# Patient Record
Sex: FEMALE | Race: WHITE
[De-identification: names, ages, dates, MRNs, and addresses within clinical notes are randomized per-mention and may not be internally consistent; named-entity substitution may affect disease eponyms.]

---

## 2017-12-18 ENCOUNTER — HOSPITAL ENCOUNTER (EMERGENCY)
Dept: HOSPITAL 88 - ER | Age: 82
LOS: 1 days | Discharge: HOME | End: 2017-12-19
Payer: MEDICARE

## 2017-12-18 VITALS — WEIGHT: 105 LBS | BODY MASS INDEX: 19.32 KG/M2 | HEIGHT: 62 IN

## 2017-12-18 DIAGNOSIS — G44.1: ICD-10-CM

## 2017-12-18 DIAGNOSIS — N30.00: ICD-10-CM

## 2017-12-18 DIAGNOSIS — G44.89: Primary | ICD-10-CM

## 2017-12-18 LAB
ALBUMIN SERPL-MCNC: 3.3 G/DL (ref 3.5–5)
ALBUMIN/GLOB SERPL: 0.7 {RATIO} (ref 0.8–2)
ALP SERPL-CCNC: 73 IU/L (ref 40–150)
ALT SERPL-CCNC: 18 IU/L (ref 0–55)
ANION GAP SERPL CALC-SCNC: 20.4 MMOL/L (ref 8–16)
BACTERIA URNS QL MICRO: (no result) /HPF
BASOPHILS # BLD AUTO: 0.1 10*3/UL (ref 0–0.1)
BASOPHILS NFR BLD AUTO: 0.5 % (ref 0–1)
BILIRUB UR QL: NEGATIVE
BUN SERPL-MCNC: 17 MG/DL (ref 7–26)
BUN/CREAT SERPL: 17 (ref 6–25)
CALCIUM SERPL-MCNC: 10.2 MG/DL (ref 8.4–10.2)
CHLORIDE SERPL-SCNC: 96 MMOL/L (ref 98–107)
CK MB SERPL-MCNC: 1.3 NG/ML (ref 0–5)
CK SERPL-CCNC: 21 IU/L (ref 29–168)
CLARITY UR: (no result)
CO2 SERPL-SCNC: 22 MMOL/L (ref 22–29)
COLOR UR: YELLOW
DEPRECATED APTT PLAS QN: 25.2 SECONDS (ref 23.8–35.5)
DEPRECATED INR PLAS: 1.33
DEPRECATED NEUTROPHILS # BLD AUTO: 8.7 10*3/UL (ref 2.1–6.9)
DEPRECATED RBC URNS MANUAL-ACNC: (no result) /HPF (ref 0–5)
EGFRCR SERPLBLD CKD-EPI 2021: 54 ML/MIN (ref 60–?)
EOSINOPHIL # BLD AUTO: 0 10*3/UL (ref 0–0.4)
EOSINOPHIL NFR BLD AUTO: 0.3 % (ref 0–6)
EPI CELLS URNS QL MICRO: (no result) /LPF
ERYTHROCYTE [DISTWIDTH] IN CORD BLOOD: 20.3 % (ref 11.7–14.4)
GLOBULIN PLAS-MCNC: 4.9 G/DL (ref 2.3–3.5)
GLUCOSE SERPLBLD-MCNC: 85 MG/DL (ref 74–118)
HCT VFR BLD AUTO: 34.2 % (ref 34.2–44.1)
HGB BLD-MCNC: 10.1 G/DL (ref 12–16)
KETONES UR QL STRIP.AUTO: (no result)
LEUKOCYTE ESTERASE UR QL STRIP.AUTO: (no result)
LYMPHOCYTES # BLD: 2.1 10*3/UL (ref 1–3.2)
LYMPHOCYTES NFR BLD AUTO: 18 % (ref 18–39.1)
MCH RBC QN AUTO: 22.1 PG (ref 28–32)
MCHC RBC AUTO-ENTMCNC: 29.5 G/DL (ref 31–35)
MCV RBC AUTO: 75 FL (ref 81–99)
MONOCYTES # BLD AUTO: 0.6 10*3/UL (ref 0.2–0.8)
MONOCYTES NFR BLD AUTO: 4.9 % (ref 4.4–11.3)
MUCOUS THREADS URNS QL MICRO: (no result)
NEUTS SEG NFR BLD AUTO: 76 % (ref 38.7–80)
NITRITE UR QL STRIP.AUTO: NEGATIVE
PLATELET # BLD AUTO: 417 X10E3/UL (ref 140–360)
POTASSIUM SERPL-SCNC: 4.4 MMOL/L (ref 3.5–5.1)
PROT UR QL STRIP.AUTO: (no result)
PROTHROMBIN TIME: 17.2 SECONDS (ref 11.9–14.5)
RBC # BLD AUTO: 4.56 X10E6/UL (ref 3.6–5.1)
SODIUM SERPL-SCNC: 134 MMOL/L (ref 136–145)
SP GR UR STRIP: 1.02 (ref 1.01–1.02)
TROPONIN I SERPL DL<=0.01 NG/ML-MCNC: 0.02 NG/ML (ref 0–0.3)
UROBILINOGEN UR STRIP-MCNC: 0.2 MG/DL (ref 0.2–1)

## 2017-12-18 PROCEDURE — 85025 COMPLETE CBC W/AUTO DIFF WBC: CPT

## 2017-12-18 PROCEDURE — 70450 CT HEAD/BRAIN W/O DYE: CPT

## 2017-12-18 PROCEDURE — 96365 THER/PROPH/DIAG IV INF INIT: CPT

## 2017-12-18 PROCEDURE — 82553 CREATINE MB FRACTION: CPT

## 2017-12-18 PROCEDURE — 36415 COLL VENOUS BLD VENIPUNCTURE: CPT

## 2017-12-18 PROCEDURE — 82550 ASSAY OF CK (CPK): CPT

## 2017-12-18 PROCEDURE — 85730 THROMBOPLASTIN TIME PARTIAL: CPT

## 2017-12-18 PROCEDURE — 81001 URINALYSIS AUTO W/SCOPE: CPT

## 2017-12-18 PROCEDURE — 99284 EMERGENCY DEPT VISIT MOD MDM: CPT

## 2017-12-18 PROCEDURE — 80053 COMPREHEN METABOLIC PANEL: CPT

## 2017-12-18 PROCEDURE — 85610 PROTHROMBIN TIME: CPT

## 2017-12-18 PROCEDURE — 84484 ASSAY OF TROPONIN QUANT: CPT

## 2017-12-18 PROCEDURE — 87086 URINE CULTURE/COLONY COUNT: CPT

## 2017-12-18 PROCEDURE — 93005 ELECTROCARDIOGRAM TRACING: CPT

## 2017-12-18 NOTE — DIAGNOSTIC IMAGING REPORT
EXAMINATION: Head CT without contrast.  





HISTORY:Headache.



COMPARISON:CT brain from 12/14/2017.



TECHNIQUE: Multidetector axial images were obtained from the foramen magnum to

the vertex without contrast. The images were reconstructed using brain and bone

algorithms.  Thin section brain images were reformatted into coronal and

sagittal planes.  

Intravenous contrast: None  



IMAGE QUALITY: Acceptable.



FINDINGS:

    Skull/scalp: No abnormality.

    Parenchyma: Focal, subtle hypodensity in the posterior inferior aspect of

left lentiform nucleus (image 9, series 2) represents age indeterminate lacunar

infarct (better visualized in current study).

Nonspecific few, scattered supratentorial white matter hypodensity are likely

related to small vessel ischemic changes.

No acute hemorrhage or mass.

    Arteries: Atherosclerotic calcification in bilateral carotid siphon.

    Dural sinuses: No abnormal density suggestive of thrombosis. 

    Ventricles: Moderate compensated dilatation due to volume loss. No

hydrocephalus.

    Extra-axial spaces: No abnormal density.  

    Brain volume: Normal for age.

    Craniocervical junction: No mass, Chiari malformation, or basilar

invagination.

    Sella: No mass. 

    Paranasal/mastoid sinuses: Imaged portions unremarkable.



IMPRESSION:



1. 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



2. Moderate generalized cerebral volume loss and mild supratentorial white

matter microvascular ischemic changes.



Signed by: Dr. Kendra Danielle M.D. on 12/18/2017 10:57 PM

## 2017-12-29 ENCOUNTER — HOSPITAL ENCOUNTER (OUTPATIENT)
Dept: HOSPITAL 88 - RAD | Age: 82
End: 2017-12-29
Attending: INTERNAL MEDICINE
Payer: MEDICARE

## 2017-12-29 DIAGNOSIS — M79.672: Primary | ICD-10-CM

## 2017-12-29 DIAGNOSIS — M1A.9XX1: ICD-10-CM

## 2017-12-29 NOTE — DIAGNOSTIC IMAGING REPORT
PROCEDURE:HAND THREE VIEWS BILATERAL

TECHNIQUE:AP, lateral, and oblique images of the right and left hands 

obtained

INDICATION:Gout and poor circulation

COMPARISON:None.

 

FINDINGS:

 

Right hand: The bones are diffusely demineralized. There is narrowing 

of the proximal and distal IP joints of all digits. There are sharply 

marginated periarticular erosions at the head of the middle phalanges 

of the second and fourth digits with associated soft tissue swelling. 

The second digit is more severely affected than the fourth digit.  

Small periarticular erosion is identified at the base of the middle 

phalanx of the fifth digit. The IP joint of the first digit exhibits 

osteophytosis in one or 2 periarticular erosions. The metacarpals and 

bones of the wrist are intact and normal in morphology. There are no 

calcifications in the soft tissues.

 

Left hand:

 

Diffusely demineralized. There are erosions and osteophyte formation of 

the first CMC joint.

This results in subluxation of the base of the first metacarpal and 

hyperextension of the thumb. The proximal and distal IP joints are 

narrowed. Periarticular erosions are identified at the base of the 

middle phalanx of the third digit and at the head of the proximal 

phalanx of the third digit with associated soft tissue swelling. There 

are small periarticular erosions at the head of the middle phalanx of 

the second digit and at the base of the distal phalanx of the third 

digit. There is increased lucency between the scaphoid and capitate. A 

small erosion along the radial cortical margin of the capitate is 

suspected. There are no calcifications in the soft tissues.

 

 

CONCLUSION:

1.  Periarticular erosions and soft tissue swelling of the right hand, 

particularly the second digit, consistent with history of gout. 

 

2. Periarticular erosions and  soft tissue swelling of the left hand 

consistent with history of gout. The base of the thumb and third digit 

are most severely affected. Involvement of the wrist by gout is also 

suspected.

 

 

 

Dictated by:  Sean Cárdenas M.D. on 12/29/2017 at 16:52     

Electronically approved by:  Sean Cárdenas M.D. on 12/29/2017 at 

16:52

## 2017-12-29 NOTE — DIAGNOSTIC IMAGING REPORT
PROCEDURE:FOOT COMPLETE BILATERAL

TECHNIQUE:AP, lateral, and oblique views of the feet obtained

INDICATION:Tophaceous gout, pain

COMPARISON:Right foot x-ray 8/5/17.

 

FINDINGS:

Right foot: No evidence of fracture or dislocation. Focal erosion of 

the base of the fifth metatarsal is stable. There are no new areas of 

periarticular erosions. No significant joint space narrowing or 

osteophytosis anywhere in the digits. A small plantar calcaneal spur is 

stable.

 

Left foot: No evidence of fracture, dislocation, or focal osseous 

lesion. There are no periarticular erosions. No joint space narrowing 

or osteophytosis. No focal osseous lesions.

 

Soft tissues: Diffuse swelling of the dorsum of each foot. No 

radiopaque foreign bodies.

 

 

CONCLUSION:

1.  Stable focal erosion of the base of the fifth metatarsal of the 

right foot.

2.  No evidence of periarticular erosion of the left foot.

3. Diffuse soft tissue swelling of each foot.

 

 

 

Dictated by:  Sean Cárdenas M.D. on 12/29/2017 at 16:42     

Electronically approved by:  Sean Cárdenas M.D. on 12/29/2017 at 

16:42

## 2018-01-08 ENCOUNTER — HOSPITAL ENCOUNTER (OUTPATIENT)
Dept: HOSPITAL 88 - WCC | Age: 83
LOS: 23 days | End: 2018-01-31
Attending: PLASTIC SURGERY
Payer: MEDICARE

## 2018-01-08 DIAGNOSIS — M10.9: ICD-10-CM

## 2018-01-08 DIAGNOSIS — K21.9: ICD-10-CM

## 2018-01-08 DIAGNOSIS — I70.245: Primary | ICD-10-CM

## 2018-01-08 DIAGNOSIS — L97.528: ICD-10-CM

## 2018-01-08 DIAGNOSIS — E03.8: ICD-10-CM

## 2018-01-08 DIAGNOSIS — I10: ICD-10-CM

## 2018-01-08 DIAGNOSIS — I70.203: ICD-10-CM

## 2018-04-08 ENCOUNTER — HOSPITAL ENCOUNTER (INPATIENT)
Dept: HOSPITAL 88 - ER | Age: 83
LOS: 5 days | Discharge: HOME HEALTH | DRG: 291 | End: 2018-04-13
Attending: INTERNAL MEDICINE | Admitting: INTERNAL MEDICINE
Payer: MEDICARE

## 2018-04-08 VITALS — SYSTOLIC BLOOD PRESSURE: 178 MMHG | DIASTOLIC BLOOD PRESSURE: 76 MMHG

## 2018-04-08 VITALS — DIASTOLIC BLOOD PRESSURE: 76 MMHG | SYSTOLIC BLOOD PRESSURE: 178 MMHG

## 2018-04-08 VITALS — BODY MASS INDEX: 18.09 KG/M2 | HEIGHT: 63 IN | WEIGHT: 102.12 LBS

## 2018-04-08 VITALS — SYSTOLIC BLOOD PRESSURE: 136 MMHG | DIASTOLIC BLOOD PRESSURE: 56 MMHG

## 2018-04-08 DIAGNOSIS — D50.9: ICD-10-CM

## 2018-04-08 DIAGNOSIS — Z86.711: ICD-10-CM

## 2018-04-08 DIAGNOSIS — I48.91: ICD-10-CM

## 2018-04-08 DIAGNOSIS — E03.9: ICD-10-CM

## 2018-04-08 DIAGNOSIS — N18.3: ICD-10-CM

## 2018-04-08 DIAGNOSIS — J18.9: ICD-10-CM

## 2018-04-08 DIAGNOSIS — I73.9: ICD-10-CM

## 2018-04-08 DIAGNOSIS — J44.0: ICD-10-CM

## 2018-04-08 DIAGNOSIS — I13.0: Primary | ICD-10-CM

## 2018-04-08 DIAGNOSIS — J43.9: ICD-10-CM

## 2018-04-08 DIAGNOSIS — I50.33: ICD-10-CM

## 2018-04-08 DIAGNOSIS — Z86.718: ICD-10-CM

## 2018-04-08 LAB
ALBUMIN SERPL-MCNC: 2.6 G/DL (ref 3.5–5)
ALBUMIN/GLOB SERPL: 0.5 {RATIO} (ref 0.8–2)
ALP SERPL-CCNC: 69 IU/L (ref 40–150)
ALT SERPL-CCNC: 23 IU/L (ref 0–55)
ANION GAP SERPL CALC-SCNC: 16.9 MMOL/L (ref 8–16)
BASOPHILS # BLD AUTO: 0.1 10*3/UL (ref 0–0.1)
BASOPHILS NFR BLD AUTO: 0.5 % (ref 0–1)
BUN SERPL-MCNC: 22 MG/DL (ref 7–26)
BUN/CREAT SERPL: 26 (ref 6–25)
CALCIUM SERPL-MCNC: 8.8 MG/DL (ref 8.4–10.2)
CHLORIDE SERPL-SCNC: 102 MMOL/L (ref 98–107)
CK MB SERPL-MCNC: 1.6 NG/ML (ref 0–5)
CK SERPL-CCNC: 22 IU/L (ref 29–168)
CO2 SERPL-SCNC: 26 MMOL/L (ref 22–29)
DEPRECATED APTT PLAS QN: 32 SECONDS (ref 23.8–35.5)
DEPRECATED INR PLAS: 1.05
DEPRECATED NEUTROPHILS # BLD AUTO: 8.6 10*3/UL (ref 2.1–6.9)
EGFRCR SERPLBLD CKD-EPI 2021: > 60 ML/MIN (ref 60–?)
EOSINOPHIL # BLD AUTO: 0.3 10*3/UL (ref 0–0.4)
EOSINOPHIL NFR BLD AUTO: 2.5 % (ref 0–6)
ERYTHROCYTE [DISTWIDTH] IN CORD BLOOD: 29.9 % (ref 11.7–14.4)
GLOBULIN PLAS-MCNC: 4.8 G/DL (ref 2.3–3.5)
GLUCOSE SERPLBLD-MCNC: 83 MG/DL (ref 74–118)
HCT VFR BLD AUTO: 31.8 % (ref 34.2–44.1)
HGB BLD-MCNC: 9.2 G/DL (ref 12–16)
LYMPHOCYTES # BLD: 1.6 10*3/UL (ref 1–3.2)
LYMPHOCYTES NFR BLD AUTO: 13.5 % (ref 18–39.1)
MCH RBC QN AUTO: 20.4 PG (ref 28–32)
MCHC RBC AUTO-ENTMCNC: 28.9 G/DL (ref 31–35)
MCV RBC AUTO: 70.7 FL (ref 81–99)
MONOCYTES # BLD AUTO: 0.9 10*3/UL (ref 0.2–0.8)
MONOCYTES NFR BLD AUTO: 7.6 % (ref 4.4–11.3)
NEUTS SEG NFR BLD AUTO: 75.5 % (ref 38.7–80)
PLATELET # BLD AUTO: 453 X10E3/UL (ref 140–360)
POTASSIUM SERPL-SCNC: 5.9 MMOL/L (ref 3.5–5.1)
PROTHROMBIN TIME: 12.9 SECONDS (ref 11.9–14.5)
RBC # BLD AUTO: 4.5 X10E6/UL (ref 3.6–5.1)
SODIUM SERPL-SCNC: 139 MMOL/L (ref 136–145)
TSH SERPL DL<=0.005 MIU/L-ACNC: 6.19 UIU/ML (ref 0.35–4.94)

## 2018-04-08 PROCEDURE — 80048 BASIC METABOLIC PNL TOTAL CA: CPT

## 2018-04-08 PROCEDURE — 84484 ASSAY OF TROPONIN QUANT: CPT

## 2018-04-08 PROCEDURE — 86920 COMPATIBILITY TEST SPIN: CPT

## 2018-04-08 PROCEDURE — 71045 X-RAY EXAM CHEST 1 VIEW: CPT

## 2018-04-08 PROCEDURE — 80053 COMPREHEN METABOLIC PANEL: CPT

## 2018-04-08 PROCEDURE — 84443 ASSAY THYROID STIM HORMONE: CPT

## 2018-04-08 PROCEDURE — 93005 ELECTROCARDIOGRAM TRACING: CPT

## 2018-04-08 PROCEDURE — 71250 CT THORAX DX C-: CPT

## 2018-04-08 PROCEDURE — 86850 RBC ANTIBODY SCREEN: CPT

## 2018-04-08 PROCEDURE — 83735 ASSAY OF MAGNESIUM: CPT

## 2018-04-08 PROCEDURE — 99284 EMERGENCY DEPT VISIT MOD MDM: CPT

## 2018-04-08 PROCEDURE — 85379 FIBRIN DEGRADATION QUANT: CPT

## 2018-04-08 PROCEDURE — 36415 COLL VENOUS BLD VENIPUNCTURE: CPT

## 2018-04-08 PROCEDURE — 85025 COMPLETE CBC W/AUTO DIFF WBC: CPT

## 2018-04-08 PROCEDURE — 86900 BLOOD TYPING SEROLOGIC ABO: CPT

## 2018-04-08 PROCEDURE — 84466 ASSAY OF TRANSFERRIN: CPT

## 2018-04-08 PROCEDURE — 83540 ASSAY OF IRON: CPT

## 2018-04-08 PROCEDURE — 85610 PROTHROMBIN TIME: CPT

## 2018-04-08 PROCEDURE — 94640 AIRWAY INHALATION TREATMENT: CPT

## 2018-04-08 PROCEDURE — 85730 THROMBOPLASTIN TIME PARTIAL: CPT

## 2018-04-08 PROCEDURE — 82550 ASSAY OF CK (CPK): CPT

## 2018-04-08 PROCEDURE — 83880 ASSAY OF NATRIURETIC PEPTIDE: CPT

## 2018-04-08 PROCEDURE — 78582 LUNG VENTILAT&PERFUS IMAGING: CPT

## 2018-04-08 PROCEDURE — 82553 CREATINE MB FRACTION: CPT

## 2018-04-08 RX ADMIN — PRAMIPEXOLE DIHYDROCHLORIDE SCH MG: 0.25 TABLET ORAL at 21:30

## 2018-04-08 RX ADMIN — FUROSEMIDE SCH MG: 10 INJECTION, SOLUTION INTRAMUSCULAR; INTRAVENOUS at 17:00

## 2018-04-08 RX ADMIN — IPRATROPIUM BROMIDE AND ALBUTEROL SULFATE SCH ML: .5; 2.5 SOLUTION RESPIRATORY (INHALATION) at 20:10

## 2018-04-08 RX ADMIN — SODIUM CHLORIDE SCH GM: 9 INJECTION, SOLUTION INTRAVENOUS at 16:23

## 2018-04-08 RX ADMIN — ENOXAPARIN SODIUM SCH MG: 60 INJECTION SUBCUTANEOUS at 14:03

## 2018-04-08 RX ADMIN — CLONIDINE HYDROCHLORIDE SCH MG: 0.2 TABLET ORAL at 21:00

## 2018-04-08 NOTE — DIAGNOSTIC IMAGING REPORT
EXAMINATION:  CHEST SINGLE (PORTABLE)    



INDICATION:       

\S\SOB

\[id number]

\S\[id number]    



COMPARISON:  Chest radiograph from 7/30/2017 and CT abdomen pelvis on 12

4/1/2017

     

FINDINGS:  AP view   



TUBES and LINES:  None.



LUNGS:  Lungs are well inflated.  Interstitial bilateral lower lobe

consolidation, left greater than right, new since prior exam.   There is no

evidence of pneumonia or pulmonary edema.



PLEURA:  No pleural effusion or pneumothorax.



HEART AND MEDIASTINUM:  The cardiac silhouette is within normal limits.

Pulmonary arteries are prominent. Fullness of the left hilar may be also due to

superimposed lymphadenopathy. Large hiatal hernia.



BONES AND SOFT TISSUES:  No acute osseous lesion.  Soft tissues are

unremarkable.



UPPER ABDOMEN: No free air under the diaphragm.    



IMPRESSION: 

Interval development of bilateral lower lobes interstitial airspace opacities

concerning for atypical pneumonia. Edema is possible, however, the heart is

normal in size and there is no redistribution of the flow.



Fullness of the left hilar, more prominent compared to prior exam. Consider

further evaluation with CT chest with IV contrast.



Unchanged large hiatal hernia. 





Signed by: Dr. Marisol Salgado M.D. on 4/8/2018 10:10 AM

## 2018-04-08 NOTE — XMS REPORT
Clinical Summary

 Created on: 2018



Daniel Alatorre

External Reference #: [id number]

: 1934

Sex: Female



Demographics







 Address  [address] [place]  [address]

 

 Home Phone  [phone]

 

 Preferred Language  English

 

 Marital Status  Unknown

 

 Rastafarian Affiliation  Voodoo

 

 Race  White

 

 Ethnic Group  Non-





Author







 Author  [employer]

 

 [facility]

 

 Address  Unknown

 

 Phone  Unavailable







Support







 Name  Relationship  Address  Phone

 

 , Silvana Brown  Unknown  [phone]







Care Team Providers







 Care Team Member Name  Role  Phone

 

  PCP  Unavailable







Allergies







    



  Active Allergy   Reactions   Severity   Noted Date   Comments

 

    



  Sulfa (Sulfonamide   Anaphylaxis   High   2015 



  Antibiotics)    







Current Medications







      



  Prescription   Sig.   Disp.   Refills   Start   End Date   Status



      Date  

 

      



  metoprolol (TOPROL-XL)   Take 100 mg by mouth       Active



  100 MG 24 hr tablet   daily.     

 

      



  pantoprazole (PROTONIX)   Take 40 mg by mouth       Active



  40 MG tablet   daily.     

 

      



  aspirin 81 MG EC tablet   Take 81 mg by mouth       Active



   daily.     

 

      



  temazepam (RESTORIL) 30   Take 30 mg by mouth every       Active



  mg capsule   night as needed for     



   Sleep.     

 

      



  magnesium oxide 400 mg   Take by mouth. 1-3 x       Active



  Cap   daily     







Active Problems







 



  Problem   Noted Date

 

 



  Hyponatremia   2015







Encounters







    



  Date   Type   Specialty   Care Team   Description

 

    



  2017   Hospital   Cardiology   Jake Chery,   Peripheral 
vascular



   Encounter    MD   disease, unspecified



      (HCC)

 

    



  2017   Outside Orders   Central Scheduling   Jake Chery 
  Peripheral vascular



     MD   disease, unspecified



      (HCC) (Primary Dx)



after 2017



Social History







    



  Tobacco Use   Types   Packs/Day   Years Used   Date

 

    



  Former Smoker    









   



  Alcohol Use   Drinks/Week   oz/Week   Comments

 

   



  Yes   









 



  Sex Assigned at Birth   Date Recorded

 

 



  Not on file 







Last Filed Vital Signs

Not on file



Plan of Treatment





Not on file



Results

* PERIPHERAL VASCULAR REPORT - SCAN (2017  3:23 PM)

* Arterial Doppler Legs Bilateral (2017 10:08 AM)





  



  Component   Value   Ref Range

 

  



  Ejection Fraction  









 



  Specimen   Performing Laboratory

 

 



   [facility] ECHO HEART[facility] MKCKESSON CPACS









 Impressions

 

 



Right Impression



1. The common femoral, profunda femoral, superficial femoral, popliteal,



posterior tibial, peroneal and anterior tibial arteries are patent with



biphasic Doppler waveforms throughout.



2. There is > 50 % stenosis of the common femoral and proximal superficial



femoral arteries with elevated velocities of 283 cm/s and 213 cm/s.



3. The PT pressure is 158 mmHg with an DELMY of 1.01(normal range) and the DP



pressure is 137 mmHg with an DELMY of 0.86 (mild obstruction range).



4. The great toe pressure is 71 mmHg with an abnormal TBI of 0.46.



5. The digits have adequate flow by PPG waveforms.



Left Impression



1. The common femoral, superficial femoral, popliteal and peroneal arteries



are patent with biphasic Doppler waveforms throughout.



2. The posterior tibial and anterior tibial arteries have monophasic



waveforms.



3. The PT pressure is 102 mmHg with an DELMY of 0.65 (severe obstruction



range)and the DP pressure is 88 mmHg with an DELMY of 0.56 (severe obstruction



range.



4. The great toe pressure is 46 mmHg with an abnormal TBI of 0.29.



5. The digits have adequate flow by PPG waveforms.



 



 Conclusions



 



 Summary



 



 Arterial pressures and Doppler analysis were performed bilaterally. The



 arteries were adequately visualized. On the right, the common femoral,



 profunda femoral, superficial femoral, popliteal, posterior tibial,



 peroneal and anterior tibial arteries were patent with biphasic Doppler



 waveforms throughout. There was stenosis of the common femoral and



 proximal superficial femoral artery. The DELMY's were in the normal to mild



 obstruction range. The TBI was abnormal. The digits had adequate flow by



 PPG. On the left, the common femoral, superficial femoral, popliteal and



 peroneal arteries were patent with biphasic Doppler waveforms throughout.



 The posterior tibial and anterior tibial arteries had monophasic



 waveforms. The DELMY's were in the severe obstruction range. The TBI was



 abnormal and the digits had adequate flow by PPG.



 



 Signature



 



 ----------------------------------------------------------------



 Electronically signed by BIANKA Shepard MD,



 JOSIAS(Interpreting physician) on 2017 02:53 PM



 ----------------------------------------------------------------



 



Velocities are measured in cm/s ; Diameters are measured in cm



 



LE Duplex Measurements



 




Right

Left



 



 +--------------------------------------------------------------+ +-------------
------+------------------+-----------------------+ +------------------+---------
--------+-------------------------+



 !Location
! !PSV!EDV
 !Waveform ! !PSV
 !EDV!Waveform
 !



 +--------------------------------------------------------------+ +-------------
------+------------------+-----------------------+ +------------------+---------
--------+-------------------------+



 !Mid Common Femoral
! !283!
!Biphasic ! !128
 ! !Biphasic
 !



 +--------------------------------------------------------------+ +-------------
------+------------------+-----------------------+ +------------------+---------
--------+-------------------------+



 !Prox PFA
! !187!
!Biphasic ! !105
 ! !Monophasic
 !



 +--------------------------------------------------------------+ +-------------
------+------------------+-----------------------+ +------------------+---------
--------+-------------------------+



 !Prox SFA
! !213!
!Biphasic ! !108
 ! !Biphasic
 !



 +--------------------------------------------------------------+ +-------------
------+------------------+-----------------------+ +------------------+---------
--------+-------------------------+



 !Mid SFA
 ! !140!
!Biphasic ! !124
 ! !Biphasic
 !



 +--------------------------------------------------------------+ +-------------
------+------------------+-----------------------+ +------------------+---------
--------+-------------------------+



 !Dist SFA
! !97.4 !
!Biphasic ! !98.2
! !Biphasic
 !



 +--------------------------------------------------------------+ +-------------
------+------------------+-----------------------+ +------------------+---------
--------+-------------------------+



 !Prox Popliteal
! !84.1 !
!Biphasic ! !65.7
! !Biphasic
 !



 +--------------------------------------------------------------+ +-------------
------+------------------+-----------------------+ +------------------+---------
--------+-------------------------+



 !Dist Popliteal
! !103!
!Biphasic ! !61
! !Biphasic
 !



 +--------------------------------------------------------------+ +-------------
------+------------------+-----------------------+ +------------------+---------
--------+-------------------------+



 !Prox PTA
! !87.2 !
!Biphasic ! !51.1
! !Biphasic
 !



 +--------------------------------------------------------------+ +-------------
------+------------------+-----------------------+ +------------------+---------
--------+-------------------------+



 !Mid PTA
 ! !91.9 !
!Biphasic ! !63.9
! !Monophasic
 !



 +--------------------------------------------------------------+ +-------------
------+------------------+-----------------------+ +------------------+---------
--------+-------------------------+



 !Dist PTA
! !92.7 !
!Biphasic ! !65.1
! !Monophasic
 !



 +--------------------------------------------------------------+ +-------------
------+------------------+-----------------------+ +------------------+---------
--------+-------------------------+



 !Prox UMESH
! !92.6 !
!Biphasic ! !57
! !Monophasic
 !



 +--------------------------------------------------------------+ +-------------
------+------------------+-----------------------+ +------------------+---------
--------+-------------------------+



 !Mid UMESH
 ! !108!
!Biphasic ! !82.5
! !Monophasic
 !



 +--------------------------------------------------------------+ +-------------
------+------------------+-----------------------+ +------------------+---------
--------+-------------------------+



 !Dist UMESH
! !86.2 !
!Biphasic ! !31
! !Monophasic
 !



 +--------------------------------------------------------------+ +-------------
------+------------------+-----------------------+ +------------------+---------
--------+-------------------------+



 !Prox Peroneal
 ! !68.6 !
!Biphasic ! !36.1
! !Biphasic
 !



 +--------------------------------------------------------------+ +-------------
------+------------------+-----------------------+ +------------------+---------
--------+-------------------------+



 !Dist Peroneal
 ! !56.3 !
!Biphasic ! !32.2
! !Biphasic
 !



 +--------------------------------------------------------------+ +-------------
------+------------------+-----------------------+ +------------------+---------
--------+-------------------------+



 









 Narrative

 

 



PV LAB - Lower Extremity Arterial Duplex



 



 Demographics



 



 Patient NameDANIEL ALATORRE Date of Study2017



 JONO



 



 MRN [id number] Age
82



 



 Visit [id number] Gender 
Female



 



 Accession [id number] Date of Birth



 Number



 



 Referring [facility] Room Number



 Physician GIRISH



 



 Sonographer Adrian Bender.Interpreting BIANKA Shepard RVT, ARDMS Physician
MD, [facility]



 



Procedure



Type of Study:



 



 Extremities Arteries: Lower Extremities Arterial Duplex, ARTERIAL DOPPLER



 LEGS, BILATERAL.



 



Indications for Study:PVD.



 



Patient Status:Routine.



Study Location:Vascular Lab.



Technical Quality:Adequate visualization.



 









 Procedure Note

 

 



Interface, External Ris In - 2017  2:53 PM CDT



PV LAB - Lower Extremity Arterial Duplex



 Demographics

 

 Patient Name    DANIEL ALATORRE   Date of Study    2017

                 JONO

 

 MRN             [id number]             Age              82

 

 Visit Number    [id number]           Gender           Female

 

 Accession       [id number]             Date of Birth    1934

 Number

 

 Referring       [facility] Room Number

 Physician STOUT

 

 Sonographer     Adrian Bender.    Interpreting     BIANKA Shepard RVT, ARDMS           Physician        MD, [facility]

 

Procedure

Type of Study:

 

 Extremities Arteries: Lower Extremities Arterial Duplex, ARTERIAL DOPPLER

 LEGS, BILATERAL.

 

Indications for Study:PVD.



Patient Status:Routine.

Study Location:Vascular Lab.

Technical Quality:Adequate visualization.



Impressions

Right Impression

 1. The common femoral, profunda femoral, superficial femoral, popliteal,

posterior tibial, peroneal and anterior tibial arteries are patent with

biphasic Doppler waveforms throughout.

 2. There is > 50 % stenosis of the common femoral and proximal superficial

femoral arteries with elevated velocities of 283 cm/s and 213 cm/s.

 3. The PT pressure is 158 mmHg with an DELMY of 1.01(normal range) and the DP

pressure is 137 mmHg with an DELMY of 0.86 (mild obstruction range).

 4. The great toe pressure is 71 mmHg with an abnormal TBI of 0.46.

 5. The digits have adequate flow by PPG waveforms.

Left Impression

 1. The common femoral, superficial femoral, popliteal and peroneal arteries

are patent with biphasic Doppler waveforms throughout.

 2. The posterior tibial and anterior tibial arteries have monophasic

waveforms.

 3. The PT pressure is 102 mmHg with an DELMY of 0.65 (severe obstruction

range)and the DP pressure is 88 mmHg with an DELMY of 0.56 (severe obstruction

range.

 4. The great toe pressure is 46 mmHg with an abnormal TBI of 0.29.

 5. The digits have adequate flow by PPG waveforms.



 Conclusions

 

 Summary

 

 Arterial pressures and Doppler analysis were performed bilaterally. The

 arteries were adequately visualized. On the right, the common femoral,

 profunda femoral, superficial femoral, popliteal, posterior tibial,

 peroneal and anterior tibial arteries were patent with biphasic Doppler

 waveforms throughout. There was stenosis of the common femoral and

 proximal superficial femoral artery. The DELMY's were in the normal to mild

 obstruction range. The TBI was abnormal. The digits had adequate flow by

 PPG. On the left, the common femoral, superficial femoral, popliteal and

 peroneal arteries were patent with biphasic Doppler waveforms throughout.

 The posterior tibial and anterior tibial arteries had monophasic

 waveforms. The DELMY's were in the severe obstruction range. The TBI was

 abnormal and the digits had adequate flow by PPG.

 

 Signature

 

 ----------------------------------------------------------------

 Electronically signed by BIANKA Shepard MD,

 RPVI(Interpreting physician) on 2017 02:53 PM

 ----------------------------------------------------------------

 

Velocities are measured in cm/s ; Diameters are measured in cm



LE Duplex Measurements



                                                                  Right        
                                                    Left

 

 +--------------------------------------------------------------+ +-------------
------+------------------+-----------------------+ +------------------+---------
--------+-------------------------+

 !Location                                                      ! !PSV         
       !EDV               !Waveform               ! !PSV               !EDV    
          !Waveform                 !

 +--------------------------------------------------------------+ +-------------
------+------------------+-----------------------+ +------------------+---------
--------+-------------------------+

 !Mid Common Femoral                                            ! !283         
       !                  !Biphasic               ! !128               !       
          !Biphasic                 !

 +--------------------------------------------------------------+ +-------------
------+------------------+-----------------------+ +------------------+---------
--------+-------------------------+

 !Prox PFA                                                      ! !187         
       !                  !Biphasic               ! !105               !       
          !Monophasic               !

 +--------------------------------------------------------------+ +-------------
------+------------------+-----------------------+ +------------------+---------
--------+-------------------------+

 !Prox SFA                                                      ! !213         
       !                  !Biphasic               ! !108               !       
          !Biphasic                 !

 +--------------------------------------------------------------+ +-------------
------+------------------+-----------------------+ +------------------+---------
--------+-------------------------+

 !Mid SFA                                                       ! !140         
       !                  !Biphasic               ! !124               !       
          !Biphasic                 !

 +--------------------------------------------------------------+ +-------------
------+------------------+-----------------------+ +------------------+---------
--------+-------------------------+

 !Dist SFA                                                      ! !97.4        
       !                  !Biphasic               ! !98.2              !       
          !Biphasic                 !

 +--------------------------------------------------------------+ +-------------
------+------------------+-----------------------+ +------------------+---------
--------+-------------------------+

 !Prox Popliteal                                                ! !84.1        
       !                  !Biphasic               ! !65.7              !       
          !Biphasic                 !

 +--------------------------------------------------------------+ +-------------
------+------------------+-----------------------+ +------------------+---------
--------+-------------------------+

 !Dist Popliteal                                                ! !103         
       !                  !Biphasic               ! !61                !       
          !Biphasic                 !

 +--------------------------------------------------------------+ +-------------
------+------------------+-----------------------+ +------------------+---------
--------+-------------------------+

 !Prox PTA                                                      ! !87.2        
       !                  !Biphasic               ! !51.1              !       
          !Biphasic                 !

 +--------------------------------------------------------------+ +-------------
------+------------------+-----------------------+ +------------------+---------
--------+-------------------------+

 !Mid PTA                                                       ! !91.9        
       !                  !Biphasic               ! !63.9              !       
          !Monophasic               !

 +--------------------------------------------------------------+ +-------------
------+------------------+-----------------------+ +------------------+---------
--------+-------------------------+

 !Dist PTA                                                      ! !92.7        
       !                  !Biphasic               ! !65.1              !       
          !Monophasic               !

 +--------------------------------------------------------------+ +-------------
------+------------------+-----------------------+ +------------------+---------
--------+-------------------------+

 !Prox UMESH                                                      ! !92.6        
       !                  !Biphasic               ! !57                !       
          !Monophasic               !

 +--------------------------------------------------------------+ +-------------
------+------------------+-----------------------+ +------------------+---------
--------+-------------------------+

 !Mid UMESH                                                       ! !108         
       !                  !Biphasic               ! !82.5              !       
          !Monophasic               !

 +--------------------------------------------------------------+ +-------------
------+------------------+-----------------------+ +------------------+---------
--------+-------------------------+

 !Dist UMESH                                                      ! !86.2        
       !                  !Biphasic               ! !31                !       
          !Monophasic               !

 +--------------------------------------------------------------+ +-------------
------+------------------+-----------------------+ +------------------+---------
--------+-------------------------+

 !Prox Peroneal                                                 ! !68.6        
       !                  !Biphasic               ! !36.1              !       
          !Biphasic                 !

 +--------------------------------------------------------------+ +-------------
------+------------------+-----------------------+ +------------------+---------
--------+-------------------------+

 !Dist Peroneal                                                 ! !56.3        
       !                  !Biphasic               ! !32.2              !       
          !Biphasic                 !

 +--------------------------------------------------------------+ +-------------
------+------------------+-----------------------+ +------------------+---------
--------+-------------------------+

 





after 2017

## 2018-04-08 NOTE — XMS REPORT
Clinical Summary

 Created on: 2018



Daniel Alatorre

External Reference #: [id number]

: 1934

Sex: Female



Demographics







 Address  [address] [place]  [address]

 

 Home Phone  [phone]

 

 Preferred Language  English

 

 Marital Status  Unknown

 

 [employer] Affiliation  Jainism

 

 Race  White

 

 Ethnic Group  Non-





Author







 Author  [employer]

 

 [facility]

 

 Address  Unknown

 

 Phone  Unavailable







Support







 Name  Relationship  Address  Phone

 

 , Silvana Brown  Unknown  [phone]







Care Team Providers







 Care Team Member Name  Role  Phone

 

  PCP  Unavailable







Allergies







    



  Active Allergy   Reactions   Severity   Noted Date   Comments

 

    



  Sulfa (Sulfonamide   Anaphylaxis   High   2015 



  Antibiotics)    







Current Medications







      



  Prescription   Sig.   Disp.   Refills   Start   End Date   Status



      Date  

 

      



  metoprolol (TOPROL-XL)   Take 100 mg by mouth       Active



  100 MG 24 hr tablet   daily.     

 

      



  pantoprazole (PROTONIX)   Take 40 mg by mouth       Active



  40 MG tablet   daily.     

 

      



  aspirin 81 MG EC tablet   Take 81 mg by mouth       Active



   daily.     

 

      



  temazepam (RESTORIL) 30   Take 30 mg by mouth every       Active



  mg capsule   night as needed for     



   Sleep.     

 

      



  magnesium oxide 400 mg   Take by mouth. 1-3 x       Active



  Cap   daily     







Active Problems







 



  Problem   Noted Date

 

 



  Hyponatremia   2015







Encounters







    



  Date   Type   Specialty   Care Team   Description

 

    



  2017   Hospital   Cardiology   Jake Chery,   Peripheral 
vascular



   Encounter    MD   disease, unspecified



      (HCC)

 

    



  2017   Outside Orders   Central Scheduling   Jake Chery 
  Peripheral vascular



     MD   disease, unspecified



      (HCC) (Primary Dx)



after 2017



Social History







    



  Tobacco Use   Types   Packs/Day   Years Used   Date

 

    



  Former Smoker    









   



  Alcohol Use   Drinks/Week   oz/Week   Comments

 

   



  Yes   









 



  Sex Assigned at Birth   Date Recorded

 

 



  Not on file 







Last Filed Vital Signs

Not on file



Plan of Treatment





Not on file



Results

* PERIPHERAL VASCULAR REPORT - SCAN (2017  3:23 PM)

* Arterial Doppler Legs Bilateral (2017 10:08 AM)





  



  Component   Value   Ref Range

 

  



  Ejection Fraction  









 



  Specimen   Performing Laboratory

 

 



   [facility] ECHO [facility]ART[facility] MKCKESSON CPACS









 Impressions

 

 



Right Impression



1. The common femoral, profunda femoral, superficial femoral, popliteal,



posterior tibial, peroneal and anterior tibial arteries are patent with



biphasic Doppler waveforms throughout.



2. There is > 50 % stenosis of the common femoral and proximal superficial



femoral arteries with elevated velocities of 283 cm/s and 213 cm/s.



3. The PT pressure is 158 mmHg with an DELMY of 1.01(normal range) and the DP



pressure is 137 mmHg with an DELMY of 0.86 (mild obstruction range).



4. The great toe pressure is 71 mmHg with an abnormal TBI of 0.46.



5. The digits have adequate flow by PPG waveforms.



Left Impression



1. The common femoral, superficial femoral, popliteal and peroneal arteries



are patent with biphasic Doppler waveforms throughout.



2. The posterior tibial and anterior tibial arteries have monophasic



waveforms.



3. The PT pressure is 102 mmHg with an DELMY of 0.65 (severe obstruction



range)and the DP pressure is 88 mmHg with an DELMY of 0.56 (severe obstruction



range.



4. The great toe pressure is 46 mmHg with an abnormal TBI of 0.29.



5. The digits have adequate flow by PPG waveforms.



 



 Conclusions



 



 Summary



 



 Arterial pressures and Doppler analysis were performed bilaterally. The



 arteries were adequately visualized. On the right, the common femoral,



 profunda femoral, superficial femoral, popliteal, posterior tibial,



 peroneal and anterior tibial arteries were patent with biphasic Doppler



 waveforms throughout. There was stenosis of the common femoral and



 proximal superficial femoral artery. The DELMY's were in the normal to mild



 obstruction range. The TBI was abnormal. The digits had adequate flow by



 PPG. On the left, the common femoral, superficial femoral, popliteal and



 peroneal arteries were patent with biphasic Doppler waveforms throughout.



 The posterior tibial and anterior tibial arteries had monophasic



 waveforms. The DELMY's were in the severe obstruction range. The TBI was



 abnormal and the digits had adequate flow by PPG.



 



 Signature



 



 ----------------------------------------------------------------



 Electronically signed by BIANKA Shepard MD,



 JOSIAS(Interpreting physician) on 2017 02:53 PM



 ----------------------------------------------------------------



 



Velocities are measured in cm/s ; Diameters are measured in cm



 



LE Duplex Measurements



 




Right

Left



 



 +--------------------------------------------------------------+ +-------------
------+------------------+-----------------------+ +------------------+---------
--------+-------------------------+



 !Location
! !PSV!EDV
 !Waveform ! !PSV
 !EDV!Waveform
 !



 +--------------------------------------------------------------+ +-------------
------+------------------+-----------------------+ +------------------+---------
--------+-------------------------+



 !Mid Common Femoral
! !283!
!Biphasic ! !128
 ! !Biphasic
 !



 +--------------------------------------------------------------+ +-------------
------+------------------+-----------------------+ +------------------+---------
--------+-------------------------+



 !Prox PFA
! !187!
!Biphasic ! !105
 ! !Monophasic
 !



 +--------------------------------------------------------------+ +-------------
------+------------------+-----------------------+ +------------------+---------
--------+-------------------------+



 !Prox SFA
! !213!
!Biphasic ! !108
 ! !Biphasic
 !



 +--------------------------------------------------------------+ +-------------
------+------------------+-----------------------+ +------------------+---------
--------+-------------------------+



 !Mid SFA
 ! !140!
!Biphasic ! !124
 ! !Biphasic
 !



 +--------------------------------------------------------------+ +-------------
------+------------------+-----------------------+ +------------------+---------
--------+-------------------------+



 !Dist SFA
! !97.4 !
!Biphasic ! !98.2
! !Biphasic
 !



 +--------------------------------------------------------------+ +-------------
------+------------------+-----------------------+ +------------------+---------
--------+-------------------------+



 !Prox Popliteal
! !84.1 !
!Biphasic ! !65.7
! !Biphasic
 !



 +--------------------------------------------------------------+ +-------------
------+------------------+-----------------------+ +------------------+---------
--------+-------------------------+



 !Dist Popliteal
! !103!
!Biphasic ! !61
! !Biphasic
 !



 +--------------------------------------------------------------+ +-------------
------+------------------+-----------------------+ +------------------+---------
--------+-------------------------+



 !Prox PTA
! !87.2 !
!Biphasic ! !51.1
! !Biphasic
 !



 +--------------------------------------------------------------+ +-------------
------+------------------+-----------------------+ +------------------+---------
--------+-------------------------+



 !Mid PTA
 ! !91.9 !
!Biphasic ! !63.9
! !Monophasic
 !



 +--------------------------------------------------------------+ +-------------
------+------------------+-----------------------+ +------------------+---------
--------+-------------------------+



 !Dist PTA
! !92.7 !
!Biphasic ! !65.1
! !Monophasic
 !



 +--------------------------------------------------------------+ +-------------
------+------------------+-----------------------+ +------------------+---------
--------+-------------------------+



 !Prox UMESH
! !92.6 !
!Biphasic ! !57
! !Monophasic
 !



 +--------------------------------------------------------------+ +-------------
------+------------------+-----------------------+ +------------------+---------
--------+-------------------------+



 !Mid UMESH
 ! !108!
!Biphasic ! !82.5
! !Monophasic
 !



 +--------------------------------------------------------------+ +-------------
------+------------------+-----------------------+ +------------------+---------
--------+-------------------------+



 !Dist UMESH
! !86.2 !
!Biphasic ! !31
! !Monophasic
 !



 +--------------------------------------------------------------+ +-------------
------+------------------+-----------------------+ +------------------+---------
--------+-------------------------+



 !Prox Peroneal
 ! !68.6 !
!Biphasic ! !36.1
! !Biphasic
 !



 +--------------------------------------------------------------+ +-------------
------+------------------+-----------------------+ +------------------+---------
--------+-------------------------+



 !Dist Peroneal
 ! !56.3 !
!Biphasic ! !32.2
! !Biphasic
 !



 +--------------------------------------------------------------+ +-------------
------+------------------+-----------------------+ +------------------+---------
--------+-------------------------+



 









 Narrative

 

 



PV LAB - Lower Extremity Arterial Duplex



 



 Demographics



 



 Patient NameDANIEL ALATORRE Date of Study2017



 JONO



 



 MRN [id number] Age
82



 



 Visit [id number] Gender 
Female



 



 Accession [id number] Date of Birth



 Number



 



 Referring [facility] Room Number



 Physician GIRISH



 



 Sonographer Adrian Bender.Interpreting BIANKA Shepard RVT, ARDMS Physician
MD, [facility]



 



Procedure



Type of Study:



 



 Extremities Arteries: Lower Extremities Arterial Duplex, ARTERIAL DOPPLER



 LEGS, BILATERAL.



 



Indications for Study:PVD.



 



Patient Status:Routine.



Study Location:Vascular Lab.



Technical Quality:Adequate visualization.



 









 Procedure Note

 

 



Interface, External Ris In - 2017  2:53 PM CDT



PV LAB - Lower Extremity Arterial Duplex



 Demographics

 

 Patient Name    DANIEL ALATORRE   Date of Study    2017

                 JONO

 

 MRN             [id number]             Age              82

 

 Visit Number    [id number]           Gender           Female

 

 Accession       [id number]             Date of Birth    1934

 Number

 

 Referring       [facility] Room Number

 Physician STOUT

 

 Sonographer     Adrian Bender.    Interpreting     BIANKA Shepard RVT, ARDMS           Physician        MD, [facility]

 

Procedure

Type of Study:

 

 Extremities Arteries: Lower Extremities Arterial Duplex, ARTERIAL DOPPLER

 LEGS, BILATERAL.

 

Indications for Study:PVD.



Patient Status:Routine.

Study Location:Vascular Lab.

Technical Quality:Adequate visualization.



Impressions

Right Impression

 1. The common femoral, profunda femoral, superficial femoral, popliteal,

posterior tibial, peroneal and anterior tibial arteries are patent with

biphasic Doppler waveforms throughout.

 2. There is > 50 % stenosis of the common femoral and proximal superficial

femoral arteries with elevated velocities of 283 cm/s and 213 cm/s.

 3. The PT pressure is 158 mmHg with an DELMY of 1.01(normal range) and the DP

pressure is 137 mmHg with an DELMY of 0.86 (mild obstruction range).

 4. The great toe pressure is 71 mmHg with an abnormal TBI of 0.46.

 5. The digits have adequate flow by PPG waveforms.

Left Impression

 1. The common femoral, superficial femoral, popliteal and peroneal arteries

are patent with biphasic Doppler waveforms throughout.

 2. The posterior tibial and anterior tibial arteries have monophasic

waveforms.

 3. The PT pressure is 102 mmHg with an DELMY of 0.65 (severe obstruction

range)and the DP pressure is 88 mmHg with an DELMY of 0.56 (severe obstruction

range.

 4. The great toe pressure is 46 mmHg with an abnormal TBI of 0.29.

 5. The digits have adequate flow by PPG waveforms.



 Conclusions

 

 Summary

 

 Arterial pressures and Doppler analysis were performed bilaterally. The

 arteries were adequately visualized. On the right, the common femoral,

 profunda femoral, superficial femoral, popliteal, posterior tibial,

 peroneal and anterior tibial arteries were patent with biphasic Doppler

 waveforms throughout. There was stenosis of the common femoral and

 proximal superficial femoral artery. The DELMY's were in the normal to mild

 obstruction range. The TBI was abnormal. The digits had adequate flow by

 PPG. On the left, the common femoral, superficial femoral, popliteal and

 peroneal arteries were patent with biphasic Doppler waveforms throughout.

 The posterior tibial and anterior tibial arteries had monophasic

 waveforms. The DELMY's were in the severe obstruction range. The TBI was

 abnormal and the digits had adequate flow by PPG.

 

 Signature

 

 ----------------------------------------------------------------

 Electronically signed by BIANKA Shepard MD,

 RPVI(Interpreting physician) on 2017 02:53 PM

 ----------------------------------------------------------------

 

Velocities are measured in cm/s ; Diameters are measured in cm



LE Duplex Measurements



                                                                  Right        
                                                    Left

 

 +--------------------------------------------------------------+ +-------------
------+------------------+-----------------------+ +------------------+---------
--------+-------------------------+

 !Location                                                      ! !PSV         
       !EDV               !Waveform               ! !PSV               !EDV    
          !Waveform                 !

 +--------------------------------------------------------------+ +-------------
------+------------------+-----------------------+ +------------------+---------
--------+-------------------------+

 !Mid Common Femoral                                            ! !283         
       !                  !Biphasic               ! !128               !       
          !Biphasic                 !

 +--------------------------------------------------------------+ +-------------
------+------------------+-----------------------+ +------------------+---------
--------+-------------------------+

 !Prox PFA                                                      ! !187         
       !                  !Biphasic               ! !105               !       
          !Monophasic               !

 +--------------------------------------------------------------+ +-------------
------+------------------+-----------------------+ +------------------+---------
--------+-------------------------+

 !Prox SFA                                                      ! !213         
       !                  !Biphasic               ! !108               !       
          !Biphasic                 !

 +--------------------------------------------------------------+ +-------------
------+------------------+-----------------------+ +------------------+---------
--------+-------------------------+

 !Mid SFA                                                       ! !140         
       !                  !Biphasic               ! !124               !       
          !Biphasic                 !

 +--------------------------------------------------------------+ +-------------
------+------------------+-----------------------+ +------------------+---------
--------+-------------------------+

 !Dist SFA                                                      ! !97.4        
       !                  !Biphasic               ! !98.2              !       
          !Biphasic                 !

 +--------------------------------------------------------------+ +-------------
------+------------------+-----------------------+ +------------------+---------
--------+-------------------------+

 !Prox Popliteal                                                ! !84.1        
       !                  !Biphasic               ! !65.7              !       
          !Biphasic                 !

 +--------------------------------------------------------------+ +-------------
------+------------------+-----------------------+ +------------------+---------
--------+-------------------------+

 !Dist Popliteal                                                ! !103         
       !                  !Biphasic               ! !61                !       
          !Biphasic                 !

 +--------------------------------------------------------------+ +-------------
------+------------------+-----------------------+ +------------------+---------
--------+-------------------------+

 !Prox PTA                                                      ! !87.2        
       !                  !Biphasic               ! !51.1              !       
          !Biphasic                 !

 +--------------------------------------------------------------+ +-------------
------+------------------+-----------------------+ +------------------+---------
--------+-------------------------+

 !Mid PTA                                                       ! !91.9        
       !                  !Biphasic               ! !63.9              !       
          !Monophasic               !

 +--------------------------------------------------------------+ +-------------
------+------------------+-----------------------+ +------------------+---------
--------+-------------------------+

 !Dist PTA                                                      ! !92.7        
       !                  !Biphasic               ! !65.1              !       
          !Monophasic               !

 +--------------------------------------------------------------+ +-------------
------+------------------+-----------------------+ +------------------+---------
--------+-------------------------+

 !Prox UMESH                                                      ! !92.6        
       !                  !Biphasic               ! !57                !       
          !Monophasic               !

 +--------------------------------------------------------------+ +-------------
------+------------------+-----------------------+ +------------------+---------
--------+-------------------------+

 !Mid UMESH                                                       ! !108         
       !                  !Biphasic               ! !82.5              !       
          !Monophasic               !

 +--------------------------------------------------------------+ +-------------
------+------------------+-----------------------+ +------------------+---------
--------+-------------------------+

 !Dist UMESH                                                      ! !86.2        
       !                  !Biphasic               ! !31                !       
          !Monophasic               !

 +--------------------------------------------------------------+ +-------------
------+------------------+-----------------------+ +------------------+---------
--------+-------------------------+

 !Prox Peroneal                                                 ! !68.6        
       !                  !Biphasic               ! !36.1              !       
          !Biphasic                 !

 +--------------------------------------------------------------+ +-------------
------+------------------+-----------------------+ +------------------+---------
--------+-------------------------+

 !Dist Peroneal                                                 ! !56.3        
       !                  !Biphasic               ! !32.2              !       
          !Biphasic                 !

 +--------------------------------------------------------------+ +-------------
------+------------------+-----------------------+ +------------------+---------
--------+-------------------------+

 





after 2017

## 2018-04-08 NOTE — XMS REPORT
Continuity of Care Document

 Created on: 2018



DANIEL ARTHUR

External Reference #: [id number]

: 1934

Sex: Female



Demographics







 Address  [address] [place]  [address]

 

 Home Phone  [phone]

 

 Preferred Language  Unknown

 

 Marital Status  Unknown

 

 Lutheran Affiliation  Unknown

 

 Race  White

 

 Additional Race(s)  

 

 Ethnic Group  Unknown





Author







 Author  [facility]

 

 Organization  [facility]

 

 Address  [address]

[place]  [address]



 

 Phone  Unavailable







Support







 Name  Relationship  Address  Phone

 

 BEVERLY ROBLERO DPM  Caregiver  [address]

[place]  [address]  [phone]

 

 ALVIN BRANDON MD  Caregiver  [address]

[address]

[place]  [address]  [phone]

 

 THANH COTTER MD  Caregiver  [address]

[address]

[place]  [address]  [phone]

 

 THANH COTTER MD  Caregiver  [address]

[address]

[place]  [address]  [phone]

 

 MARK VIDAL  Next Of Kin  [address] [id number]  [phone]







Care Team Providers







 Care Team Member Name  Role  Phone

 

 THANH COTTER MD  PCP  [phone]







Insurance Providers







 Guarantor  Daniel Arthur

 

 Address  [address] [id number]

 

 Phone  [phone]

 

 Email  NONE











 Payer  Medicare A & B

 

 Policy Number  [id number]

 

 Subscriber's Name  Daniel Arthur

 

 Relationship  18 Self / Same As Patient

 

 Group Number  [id number]

 

 Group Name  RETIRED

 

 Effective Date  99











 Payer  GEHA

 

 Policy Number  [id number]

 

 Subscriber's Name  Josh Alatorre

 

 Relationship  01 

 

 Group Number  [id number]

 

 Group Name  RETIRED

 

 Effective Date  83







Advance Directives







 Directive  Response  Recorded Date/Time

 

 Does the patient have an advance directive?  Yes  17 4:32am

 

 If yes, is advance directive on file with [facility]?  No  17 4:32am

 

 If not on file with [facility] will patient provide a copy?  Yes  17 4:32am

 

 Do you have a Directive to Physician?  No  18 10:23am

 

 Do you have a Medical Power of ?  No  18 10:23am

 

 Do you have an out of hospital Do Not Resuscitate Order?  No  18 10:23am

 

 Do you have any special needs we should be aware of?  No  18 10:23am

 

 Do you have a support person here with you today?  No  18 10:23am

 

 Did patient receive Notice of Privacy Practices?  Yes  18 10:23am

 

 Did patient receive patient rights and responsibilities?  Yes  18 10:23am







Problems







 Medical Problem  Onset Date  Status

 

 Arterial occlusion, lower extremity  Unknown   

 

 Cystitis  Unknown  Acute

 

 Headache  Unknown  Acute

 

 Hypertension  Unknown  Acute

 

 Urinary tract infection  Unknown  Acute







Medications





Current Home Medications





 Medication  Dose  Units  Route  Directions  Days  Qty  Instructions  Start Date

 

 Amlodipine Besylate 5 Mg Tablet  10  Mg  Oral  Daily     30 Tab      

 

 Aspirin (Aspir 81) 81 Mg Tablet.dr  81  Mg  Oral  Daily            

 

 Biotin 2,500 Mcg Capsule  5,000  Mg  Oral  Daily            

 

 Clonidine Hcl 0.2 Mg Tablet  0.2  Mg  Oral  Three Times A Day            

 

 Hydrocodone Bit/Acetaminophen (Norco 5-325 Tablet) 1 Each Tablet  1  Each  
Oral  As Needed            

 

 Ibesartan  150  Mg  Oral  Daily            

 

 Levothyroxine Sodium 50 Mcg Tablet  50  Mcg  Oral  Daily     30 Tab      

 

 Metoprolol Succinate 50 Mg Tab.er.24h  100  Mg  Oral  Daily            

 

 Pantoprazole Sodium (Protonix) 40 Mg Tablet.dr  40  Mg  Oral  Daily            

 

 Pitavastatin Calcium (Livalo) 4 Mg Tablet  4  Mg  Oral  Daily            

 

 Pramipexole Di-Hcl (Mirapex) 0.25 Mg Tablet  0.5  Mg  Oral  Bedtime     30 Tab
      







Social History







 Social History Problem  Response  Recorded Date/Time  Onset Date  Status

 

 Hx Psychiatric Problems  No  2017 4:32am  Not Applicable  Not Applicable

 

 Hx Eating Disorder  No  2017 4:32am  Not Applicable  Not Applicable

 

 Hx Substance Use Disorder  No  2017 4:32am  Not Applicable  Not 
Applicable

 

 Hx Depression  No  2017 4:32am  Not Applicable  Not Applicable

 

 Hx Alcohol Use  No  2017 4:32am  Not Applicable  Not Applicable

 

 Hx Substance Use Treatment  No  2017 4:32am  Not Applicable  Not 
Applicable

 

 Hx Physical Abuse  No  2017 4:32am  Not Applicable  Not Applicable







Hospital Discharge Instructions

No hospital discharge instruction information available.



Plan of Care







 Prescriptions  See Medication Section







Functional Status

No functional status information available.



Allergies, Adverse Reactions, Alerts







 Allergen  Type  Severity  Reaction  Status  Last Updated

 

 Sulfa (Sulfonamide Antibiotics)  Allergy  Mild     Active  17

 

 Statins-Hmg-Coa Reductase Inhibitor  Adverse Reaction  Intermediate  MUSCLE 
SPASMS/SEVERE LEG CRAMPS  Active  17

 

 Levofloxacin  Allergy  Mild  MUSCLE CRAMPS  Active  12/09/10







Immunizations

No immunization information available.



Vital Signs





Acute Vital Signs





 Vital  Response  Date/Time

 

 Temperature (Fahrenheit)  97.6 degrees F (97.6 - 99.5)  2017 4:38pm

 

 Pulse      

 

    Pulse Rate (adult)  81 bpm (60 - 90)  2017 4:38pm

 

 Respiratory Rate  18 bpm (12 - 24)  2017 4:38pm

 

 Blood Pressure  134/62 mm Hg  2017 4:38pm







Results





Laboratory Results





 Test Name  Result  Units  Flags  Reference  Collection Date/Time  Result Date/
Time  Comments

 

 Platelet Estimate  ADEQUATE           2017 6:20am  2017 7:57am   

 

 Platelet Morphology Comment  NORMAL           2017 6:20am  2017 7:
57am   

 

 Hypochromasia  MODERATE           2017 6:20am  2017 7:57am   

 

 Anisocytosis  MODERATE           2017 6:20am  2017 7:57am   

 

 Microcytosis  MARKED           2017 6:20am  2017 7:57am   

 

 Target Cells  FEW           2017 6:20am  2017 7:57am   

 

 Ovalocytes  FEW           2017 6:20am  2017 7:57am   

 

 Red Cell Morphology Comment  ABNORMAL           2017 6:20am  2017 7
:57am   

 

 Magnesium Level  1.9  MG/DL     1.3-2.1  2017 6:50am  2017 7:32am 
  

 

 Amylase Level  94  U/L       2017 7:25am  2017 9:16am   

 

 Lipase  26  U/L     8-78  2017 7:25am  2017 9:16am   

 

 Bedside Glucose  141  mg/dL   H    2017 4:09pm  2017 4:25pm  
Meter ID: [id number]



 

 Uric Acid  8.1  mg/dL   H  2.6-6.0  2017 4:15pm  2017 5:59pm   

 

 Triglycerides Level  131  MG/DL     0-149  2017 7:24am  2017 8:
26am   

 

 Cholesterol Level  273  MD/DL   H  0-199  2017 7:24am  2017 8:26am
  Less than 200 mg/dL       Low Risk



 201 - 239 mg/dL           Borderline Risk



 240 mg/dl and greater     High Risk                        



 

 LDL Cholesterol  190  MG/DL   H    2017 7:24am  2017 8:26am 
  

 

 HDL Cholesterol  57  MG/DL     40-60  2017 7:24am  2017 8:26am   

 

 Cholesterol/HDL Ratio  4.8      H  3.0-3.6  2017 7:24am  2017 8:
26am   

 

 B-Type Natriuretic Peptide  950.6  pg/mL   H  0-100  2017 4:15pm  2017 6:52pm   

 

 White Blood Count  11.46  x10e3/uL   H  4.8-10.8  2017 9:23pm  2017 10:30pm   

 

 Red Blood Count  4.56  x10e6/uL     3.6-5.1  2017 9:23pm  2017 10:
30pm   

 

 Hemoglobin  10.1  g/dL   L  12.0-16.0  2017 9:23pm  2017 10:30pm   

 

 Hematocrit  34.2  %     34.2-44.1  2017 9:23pm  2017 10:30pm   

 

 Mean Corpuscular Volume  75.0  fL   L  81-99  2017 9:23pm  2017 10:
30pm   

 

 Mean Corpuscular Hemoglobin  22.1  pg   L  28-32  2017 9:23pm  2017 10:30pm   

 

 Mean Corpuscular Hemoglobin Concent  29.5  g/dL   L  31-35  2017 9:23pm  
2017 10:30pm   

 

 Red Cell Distribution Width  20.3  %   H  11.7-14.4  2017 9:23pm  2017 10:30pm   

 

 Platelet Count  417  x10e3/uL   H  140-360  2017 9:23pm  2017 10:
30pm   

 

 Neutrophils (%) (Auto)  76.0  %     38.7-80.0  2017 9:pm  2017 10
:30pm   

 

 Lymphocytes (%) (Auto)  18.0  %     18.0-39.1  2017 9:23pm  2017 10
:30pm   

 

 Monocytes (%) (Auto)  4.9   %     4.4-11.3  2017 9:23pm  2017 10:
30pm   

 

 Eosinophils (%) (Auto)  0.3  %     0.0-6.0  2017 9:2017 10:
30pm   

 

 Basophils (%) (Auto)  0.5  %     0.0-1.0  2017 9:23pm  2017 10:
30pm   

 

 IM GRANULOCYTES %  0.3  %     0.0-1.0  2017 9:23pm  2017 10:30pm   

 

 Neutrophils # (Auto)  8.7      H  2.1-6.9  2017 9:23pm  2017 10:
30pm   

 

 Lymphocytes # (Auto)  2.1        1.0-3.2  2017 9:23pm  2017 10:
30pm   

 

 Monocytes # (Auto)  0.6        0.2-0.8  2017 9:23pm  2017 10:30pm 
  

 

 Eosinophils # (Auto)  0.0        0.0-0.4  2017 9:23pm  2017 10:
30pm   

 

 Basophils # (Auto)  0.1        0.0-0.1  2017 9:2017 10:30pm 
  

 

 Absolute Immature Granulocyte (auto  0.04  x10e3/uL     0-0.1  2017 9:
23pm  2017 10:30pm   

 

 Prothrombin Time  17.2  seconds   H  11.9-14.5  2017 9:23pm  2017 
10:45pm   

 

 Prothromb Time International Ratio  1.33           2017 9:23pm  12/18/
2017 10:45pm  Oral Anticoagulant Therapy INR Values:



 1. Low Intensity Therapy        1.5 - 2.0



 2. Moderate Intensity Therapy   2.0 - 3.0



 3. High Intensity Therapy(1)    2.5 - 3.5



 4. High Intensity Therapy(2)    3.0 - 4.0



 5. Panic Value INR              > 5.0

 

 Activated Partial Thromboplast Time  25.2  seconds     23.8-35.5  2017 9:
23pm  2017 10:45pm   

 

 Urine Color  YELLOW        YELLOW  2017 9:20pm  2017 10:26pm   

 

 Urine Clarity  SL CLOUDY        CLEAR  2017 9:20pm  2017 10:26pm   

 

 Urine Specific Gravity  1.020        1.010-1.025  2017 9:20pm  2017 10:26pm   

 

 Urine pH  6        5 - 7  2017 9:20pm  2017 10:26pm   

 

 Urine Leukocyte Esterase  TRACE      H  NEGATIVE  2017 9:20pm  2017 10:26pm   

 

 Urine Nitrite  NEGATIVE        NEGATIVE  2017 9:20pm  2017 10:26pm
   

 

 Urine Protein  1+      H  NEGATIVE  2017 9:20pm  2017 10:26pm   

 

 Urine Glucose (UA)  NEGATIVE        NEGATIVE  2017 9:20pm  2017 10:
26pm   

 

 Urine Ketones  TRACE      H  NEGATIVE  2017 9:20pm  2017 10:26pm   

 

 Urine Urobilinogen  0.2  mg/dL     0.2 - 1  2017 9:20pm  2017 10:
26pm   

 

 Urine Bilirubin  NEGATIVE        NEGATIVE  2017 9:20pm  2017 10:
26pm   

 

 Urine Blood  NEGATIVE        NEGATIVE  2017 9:20pm  2017 10:26pm   

 

 Urine WBC  11-20  /HPF   H  0-5  2017 9:20pm  2017 10:32pm   

 

 Urine RBC  0-5  /HPF     0-5  2017 9:20pm  2017 10:32pm   

 

 Urine Bacteria  FEW  /HPF     NONE  2017 9:20pm  2017 10:32pm   

 

 Urine Epithelial Cells  MODERATE  /LPF     NONE  2017 9:20pm  2017 
10:32pm   

 

 Urine Mucus  FEW      H  RARE  2017 9:20pm  2017 10:32pm   

 

 Sodium Level  134  mmol/L   L  136-145  2017 9:32pm  2017 10:55pm 
  

 

 Potassium Level  4.4  mmol/L     3.5-5.1  2017 9:32pm  2017 10:
55pm   

 

 Chloride Level  96  mmol/L   L    2017 9:32pm  2017 10:55pm 
  

 

 Carbon Dioxide Level  22  mmol/L     22-29  2017 9:32pm  2017 10:
55pm   

 

 Anion Gap  20.4  mmol/L   H  8-16  2017 9:32p  2017 10:55pm   

 

 Blood Urea Nitrogen  17  mg/dL     7-26  2017 9:32pm  2017 10:55pm
   

 

 Creatinine  0.99  mg/dL     0.57-1.11  2017 9:32pm  2017 10:55pm   

 

 BUN/Creatinine Ratio  17        6-25  2017 9:32p  2017 10:55pm   

 

 Estimat Glomerular Filtration Rate  54  ML/MIN   L  60-  2017 9:32p   10:55pm  Ranges were taken from the National Kidney Disease Education 

Program and the National Kidney Foundation literature.







Reference ranges:



 60 or greater: Normal



 16-59 (for 3 consecutive months): Chronic kidney disease 



 15 or less: Kidney failure

 

 Glucose Level  85  mg/dL       2017 9:32pm  2017 10:55pm   

 

 Calcium Level  10.2  mg/dL     8.4-10.2  2017 9:32pm  2017 10:55pm
   

 

 Total Bilirubin  0.4  mg/dL     0.2-1.2  2017 9:32pm  2017 10:55pm
   

 

 Aspartate Amino Transf (AST/SGOT)  26  IU/L     5-34  2017 9:32pm  2017 10:55pm   

 

 Alanine Aminotransferase (ALT/SGPT)  18  IU/L     0-55  2017 9:32pm   10:55pm   

 

 Total Protein  8.2  g/dL   H  6.5-8.1  2017 9:32p  2017 10:55pm   

 

 Albumin  3.3  g/dL   L  3.5-5.0  2017 9:32p  2017 10:55pm   

 

 Globulin  4.9  g/dL   H  2.3-3.5  2017 9:32p  2017 10:55pm   

 

 Albumin/Globulin Ratio  0.7      L  0.8-2.0  2017 9:32p  2017 10:
55pm   

 

 Alkaline Phosphatase  73  IU/L       2017 9:32p  2017 10:
55pm   

 

 Creatine Kinase  21  IU/L   L    2017 9:32p  2017 10:55pm   

 

 Creatine Kinase MB  1.30  ng/mL     0.00-5.00  2017 9:32p  2017 11
:00pm   

 

 Troponin I  0.023  ng/mL     0-0.300  2017 9:32p  2017 11:00pm   







Procedures







 Procedure  Status  Date  Provider(s)

 

 CT of abdomen and pelvis without contrast  Active  17  CICI CARLOS MD

 

 X-ray of chest, two views  Active  17  CICI CARLOS MD

 

 X-ray of chest, two views  Active  17  COBY TY MD

 

 Computed tomography angiography of abdominal aorta and bilateral iliofemoral 
arteries with lower extremity runoff without then with contrast  Active    CICI CARLOS MD

 

 Computed tomography of brain without radiopaque contrast  Active  17  
NOEMY SUGGS MD

 

 Computed tomography of brain without radiopaque contrast  Active  17  
FABIOLA CASTILLO MD







Encounters







 Encounter  Location  Arrival/Admit Date  Discharge/Depart Date  Attending 
Provider

 

 Discharged Recurring  [facility]  18 7:55am  18 
11:59pm  ALVIN BRANDON MD

 

 Registered Clinic  [facility]  17 3:00pm     JALIL REAGAN

 

 Departed Emergency Room  [facility]  17 4:36pm   12:30am  FABIOLA CASTILLO MD

 

 Registered Clinic  [facility]  17 2:08pm     NOEMY SUGGS MD

 

 Discharged Inpatient  [facility]  17 2:10am  17 
7:15pm  THANH COTTER MD

 

 Registered Clinic  [facility]  17 1:20pm     THANH COTTER MD

 

 Discharged Inpatient  [facility]  17 4:13pm  17 
2:20pm  THANH COTTER MD

## 2018-04-08 NOTE — XMS REPORT
Clinical Summary

 Created on: 2018



Daniel Arthur

External Reference #: [id number]

: 1934

Sex: Female



Demographics







 Address  [address] [place]  [address]

 

 Home Phone  [phone]

 

 Preferred Language  English

 

 Marital Status  

 

 Yazdanism Affiliation  Unknown

 

 Race  White

 

 Ethnic Group  Non-





Author







 Author  [facility]

 

 Organization  [facility]

 

 Address  Unknown

 

 Phone  Unavailable







Support







 Name  Relationship  Address  Phone

 

 Brooke Brown  Unknown  [phone]







Care Team Providers







 Care Team Member Name  Role  Phone

 

 Josh Cunningham MD  PCP  Unavailable







Allergies







    



  Active Allergy   Reactions   Severity   Noted Date   Comments

 

    



  Levofloxacin   Swelling    2017 

 

    



  Sulfa (Sulfonamide     2017 



  Antibiotics)    







Current Medications







      



  Prescription   Sig.   Disp.   Refills   Start   End Date   Status



      Date  

 

      



  aspirin (ECOTRIN) 81 MG   Take 81 mg by mouth.       Active



  enteric coated tablet      

 

      



  amLODIPine (NORVASC) 5 mg   Take 5 mg by mouth once    3   20    Active



  tablet   daily.     17  

 

      



  clonIDINE HCl (CATAPRES)      20    Active



  0.2 MG tablet      17  

 

      



  levothyroxine (SYNTHROID,   TAKE 1 TABLET BY MOUTH ON    3   04/15/20    
Active



  LEVOXYL) 50 mcg tablet   AN EMPTY STOMACH     17  

 

      



  metoprolol succinate XL   Take 100 mg by mouth once    3   20    Active



  (TOPROL-XL) 100 mg 24 hr   daily.     17  



  tablet      

 

      



  pantoprazole (PROTONIX)   Take 40 mg by mouth once    3   20    Active



  40 MG EC tablet   daily.     17  

 

      



  HYDROcodone-acetaminophen   Take 1 tablet by mouth       Active



  (NORCO) 5-325 mg per   every 6 (six) hours as     



  tablet   needed for moderate pain.     







Active Problems







 



  Problem   Noted Date

 

 



  GERD (gastroesophageal reflux disease) 

 

 



  Substernal chest pain 







Encounters







    



  Date   Type   Specialty   Care Team   Description

 

    



  2017   Documentation   Gastroenterology   Chester Dodge MD 

 

    



  2017   [facility]   Radiology   Chester Dodge   Substernal 
chest pain



   Encounter    MD 

 

    



  2017   [facility]   Radiology   Chester Dodge   Substernal 
chest pain



   Encounter    MD 

 

    



  2017   Office Visit   Gastroenterology   Chester Dodge   
Substernal chest pain



     MD   (Primary Dx);



      Gastroesophageal reflux



      disease, esophagitis



      presence not specified



after 2017



Family History







   



  Relation   Name   Status   Comments

 

   



  Father     

 

   



  Mother     







Social History







    



  Tobacco Use   Types   Packs/Day   Years Used   Date

 

    



  Former Smoker    









   



  Alcohol Use   Drinks/Week   oz/Week   Comments

 

   



  No   









 



  Sex Assigned at Birth   Date Recorded

 

 



  Not on file 







Last Filed Vital Signs







  



  Vital Sign   Reading   Time Taken

 

  



  Blood Pressure   212/77   2017  2:08 PM CDT

 

  



  Pulse   90   2017  2:08 PM CDT

 

  



  Temperature   36.4   C (97.5   F)   2017  2:08 PM CDT

 

  



  Respiratory Rate   -   -

 

  



  Oxygen Saturation   -   -

 

  



  Inhaled Oxygen   -   -



  Concentration  

 

  



  Weight   51.3 kg (113 lb)   2017  2:08 PM CDT

 

  



  Height   -   -

 

  



  Body Mass Index   -   -







Plan of Treatment







   



  Health Maintenance   Due Date   Last Done   Comments

 

   



  ZOSTER VACCINE   1994  

 

   



  PNEUMOCOCCAL   1999  



  POLYSACCHARIDE VACCINE   



  AGE 65 AND OVER   

 

   



  PNEUMOCOCCAL-13   1999  

 

   



  INFLUENZA VACCINE   2018  







Results

* US Abdomen Complete (2017 12:18 PM)





 



  Specimen   Performing Laboratory

 

 



    [facility]



   [address] [place] [address]









 Narrative

 

 



EXAM: US ABDOMEN COMPLETE



 



CLINICAL DATA:R07.2 Precordial pain, ABDOMINAL PAIN



 



COMPARISON: Yet



 



IMPRESSION:



 



LIVER:The liver demonstrates normal echogenicity without focal mass or 
intrahepatic biliary ductal dilatation.



 



GALLBLADDER:The gallbladder is without evidence of calculi. The 
gallbladder wall is not thickened and there is no pericholecystic fluid.



 



CBD: 2.3 mm, within normal limits.



 



MPV:Demonstrated normal hepatopedal flow.0.98 cm diameter 



 



 



PANCREAS:The partially visualized portions of the pancreas are within 
normal limits.



 



SPLEEN:.Spleen is not enlarged.7.0 cm.



 



 



 



RIGHT KIDNEY: Right kidney demonstrated no mass or obstruction The right kidney 
measures 9.2cm. There are 2 renal cysts.



 



LEFT KIDNEY: Left kidney demonstrated no mass or obstruction The left kidney 
measures 7.7cm. There are 3 renal cysts with the largest measuring 5.7 cm..



 



 



 



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 
maximum diameter of 2.0 cm..



 



IVC:The visualized portions of the inferior vena cava are unremarkable.



 



ASCITES: No abnormal abdominal fluid collections are visualized. There is no 
evidence of ascites.



 



PLEURAL EFFUSION:There are no pleural effusions.



 



 



 



 



 



[facility]-[id number]



 









 Procedure Note

 

 



 Interface, Radiology Results Incoming - 2017  3:25 PM CDT



EXAM: US ABDOMEN COMPLETE



CLINICAL DATA:  R07.2 Precordial pain, ABDOMINAL PAIN



COMPARISON: Yet



IMPRESSION:



LIVER:  The liver demonstrates normal echogenicity without focal mass or 
intrahepatic biliary ductal dilatation.



  GALLBLADDER:  The gallbladder is without evidence of calculi. The gallbladder 
wall is not thickened and there is no pericholecystic fluid.



  CBD: 2.3 mm, within normal limits.



  MPV:  Demonstrated normal hepatopedal flow.  0.98 cm diameter 





PANCREAS:  The partially visualized portions of the pancreas are within normal 
limits.



SPLEEN:.  Spleen is not enlarged.  7.0 cm.







RIGHT KIDNEY: Right kidney demonstrated no mass or obstruction The right kidney 
measures 9.2cm. There are 2 renal cysts.



LEFT KIDNEY: Left kidney demonstrated no mass or obstruction The left kidney 
measures 7.7cm. There are 3 renal cysts with the largest measuring 5.7 cm..







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 
maximum diameter of 2.0 cm..

 

IVC:  The visualized portions of the inferior vena cava are unremarkable.



ASCITES: No abnormal abdominal fluid collections are visualized. There is no 
evidence of ascites.



PLEURAL EFFUSION:  There are no pleural effusions.





 





[facility]-[id number]







* FL Esophagram Complete (2017  4:00 PM)





 



  Specimen   Performing Laboratory

 

 



   [facility]



   [address] [place] [id number]









 Narrative

 

 



EXAMINATION:FL ESOPHAGRAM COMPLETE



 



CLINICAL HISTORY:R07.2 Precordial pain, Chest pain



 



COMPARISON:None.



 



Fluoroscopy time: 1.3 minute.



 



FINDINGS: 



The patient swallowed air producing granules and barium without difficulty. The 
esophagus demonstrates frequent tertiary contractions in the distal esophagus. 
No stricture or suspicious filling defect is seen. 



 



There is a moderate sliding hiatal hernia. 



 



 



IMPRESSION:



 



Moderate hiatal hernia.



 



Prominent tertiary contractions.



 



[facility]-[id number]



 









 Procedure Note

 

 



 Interface, Radiology Results Incoming - 2017  4:29 PM CDT



EXAMINATION:  FL ESOPHAGRAM COMPLETE



CLINICAL HISTORY:  R07.2 Precordial pain, Chest pain



COMPARISON:  None.



Fluoroscopy time: 1.3 minute.



FINDINGS: 

The patient swallowed air producing granules and barium without difficulty. The 
esophagus demonstrates frequent tertiary contractions in the distal esophagus. 
No stricture or suspicious filling defect is seen. 



There is a moderate sliding hiatal hernia. 





IMPRESSION:



Moderate hiatal hernia.



Prominent tertiary contractions.



[facility]-[id number]







after 2017



Insurance







     



  Payer   Benefit   Subscriber ID   Type   Phone   Address



   Plan /    



   Group    

 

     



  MEDICARE   MEDICARE   xxxxxxxxxxx   Medicare    [place], [place]



   PART A AND    



   B    

 

     



  GEHA   GEHA MED   xxxxxxxxxxxx   Commercial  



   SUPPLEMENT    









     



  Guarantor Name   Account   Relation to   Date of   Phone   Billing Address



   Type   Patient   Birth  

 

     



  DANIEL ARTHUR   Personal/F   Self   1934   Home:   [address]     [phone]   [place] [id number]

## 2018-04-08 NOTE — DIAGNOSTIC IMAGING REPORT
EXAM: VENTILATION PERFUSION LUNG SCAN



INDICATION: SOB



COMPARISON: Chest radiograph 4/8/2018



DISCUSSION: Xenon-133 gas 15 mCi was administered via inhalation. Dynamic

images of the lungs in the posterior projection were obtained through single

breath and washout phases. Distribution of tracer activity is irregular

throughout the lungs. Washout is diffusely delayed  with diffuse air trapping.



Perfusion images of the lungs in multiple projections were obtained following

intravenous administration of 6.3 mCi of Tc-99m MAA. Distribution of tracer is

irregular throughout the lungs. There are no segmental perfusion defects of any

size. The contours of the lungs are well demarcated.



The cardiac silhouette is unremarkable.



IMPRESSION: 



1. Scan findings represent a VERY LOW probability for acute pulmonary embolic

disease based on the PIOPED II criteria.



2. Scan findings are compatible with diffuse parenchymal and/or obstructive

lung disease.



Signed by: Dr. Veena Mccain M.D. on 4/8/2018 2:59 PM

## 2018-04-08 NOTE — XMS REPORT
Patient Summary Document

 Created on: 2018



DANIEL CEDENO

External Reference #: [id number]

: 1934

Sex: Female



Demographics







 Address  [address] [place] [address]

 

 Home Phone  [phone]

 

 Preferred Language  Unknown

 

 Marital Status  Unknown

 

 Yazdanism Affiliation  Unknown

 

 Race  Unknown

 

 Ethnic Group  Unknown





Author







 Author  [facility]

 

 Address  Unknown

 

 Phone  Unavailable







Care Team Providers







 Care Team Member Name  Role  Phone

 

 REAGANJALIL NGUYEN  Unavailable  Unavailable

 

 FABIOLA CASTILLO  Unavailable  Unavailable

 

 NOEMY SUGGS  Unavailable  Unavailable

 

 SWEET, TIA  Unavailable  Unavailable







Problems

This patient has no known problems.



Allergies, Adverse Reactions, Alerts

This patient has no known allergies or adverse reactions.



Medications

This patient has no known medications.



Results







 Test Description  Test Time  Test Comments  Text Results  Atomic Results  
Result Comments









 HAND THREE VIEWS BILATERAL            [facility]   [address] [place] [address]      Patient Name: DANIEL CEDENO   MR #: [id number]    : 1934 Age/Sex: 83/F  Acct #: 
[id number] Req #: [id number]  Adm Physician:     Ordered by: JALIL REAGAN   
Report #: [id number]   Location: [facility]  Room/Bed:     _____________________________
______________________________________________________________________    
Procedure: [id number] DX/HAND THREE VIEWS BILATERAL  Exam Date: 17        
                    Exam Time: 1520       REPORT STATUS: Signed    PROCEDURE:   
HAND THREE VIEWS BILATERAL   TECHNIQUE:   AP, lateral, and oblique images of 
the right and left hands    obtained   INDICATION:   Gout and poor circulation 
  COMPARISON:   None.       FINDINGS:       Right hand: The bones are diffusely 
demineralized. There is narrowing    of the proximal and distal IP joints of 
all digits. There are sharply    marginated periarticular erosions at the head 
of the middle phalanges    of the second and fourth digits with associated soft 
tissue swelling.    The second digit is more severely affected than the fourth 
digit.     Small periarticular erosion is identified at the base of the middle 
   phalanx of the fifth digit. The IP joint of the first digit exhibits    
osteophytosis in one or 2 periarticular erosions. The metacarpals and    bones 
of the wrist are intact and normal in morphology. There are no    
calcifications in the soft tissues.       Left hand:       Diffusely 
demineralized. There are erosions and osteophyte formation of    the first CMC 
joint.   This results in subluxation of the base of the first metacarpal and    
hyperextension of the thumb. The proximal and distal IP joints are    narrowed. 
Periarticular erosions are identified at the base of the    middle phalanx of 
the third digit and at the head of the proximal    phalanx of the third digit 
with associated soft tissue swelling. There    are small periarticular erosions 
at the head of the middle phalanx of    the second digit and at the base of the 
distal phalanx of the third    digit. There is increased lucency between the 
scaphoid and capitate. A    small erosion along the radial cortical margin of 
the capitate is    suspected. There are no calcifications in the soft tissues. 
          CONCLUSION:   1.  Periarticular erosions and soft tissue swelling of 
the right hand,    particularly the second digit, consistent with history of 
gout.        2. Periarticular erosions and  soft tissue swelling of the left 
hand    consistent with history of gout. The base of the thumb and third digit 
   are most severely affected. Involvement of the wrist by gout is also    
suspected.               Dictated by:  Kelton Cárdenas M.D. on 2017 at 16
:52        Electronically approved by:  Kelton Cárdenas M.D. on 2017 at 
   16:52                Dictated By: KELTON CÁRDENAS MD  Electronically 
Signed By: KELTON CÁRDENAS MD on 17  Transcribed By: KATE on        COPY TO:   JALIL REAGAN           

 

 FOOT COMPLETE BILATERAL            [place] [address]      Patient Name: DANIEL CEDENO   MR #: [id number]    : 1934 Age/Sex: 83/F  Acct #: 
[id number] Req #: [id number]  Adm Physician:     Ordered by: JALIL REAGAN   
Report #: [id number]   Location: [facility]  Room/Bed:     _____________________________
______________________________________________________________________    
Procedure: [id number] DX/FOOT COMPLETE BILATERAL  Exam Date: 17           
                 Exam Time: 1520       REPORT STATUS: Signed    PROCEDURE:   
FOOT COMPLETE BILATERAL   TECHNIQUE:   AP, lateral, and oblique views of the 
feet obtained   INDICATION:   Tophaceous gout, pain   COMPARISON:   Right foot x
-ray 17.       FINDINGS:   Right foot: No evidence of fracture or 
dislocation. Focal erosion of    the base of the fifth metatarsal is stable. 
There are no new areas of    periarticular erosions. No significant joint space 
narrowing or    osteophytosis anywhere in the digits. A small plantar calcaneal 
spur is    stable.       Left foot: No evidence of fracture, dislocation, or 
focal osseous    lesion. There are no periarticular erosions. No joint space 
narrowing    or osteophytosis. No focal osseous lesions.       Soft tissues: 
Diffuse swelling of the dorsum of each foot. No    radiopaque foreign bodies.  
         CONCLUSION:   1.  Stable focal erosion of the base of the fifth 
metatarsal of the    right foot.   2.  No evidence of periarticular erosion of 
the left foot.   3. Diffuse soft tissue swelling of each foot.               
Dictated by:  Kelton Cárdenas M.D. on 2017 at 16:42        
Electronically approved by:  Kelton Cárdenas M.D. on 2017 at    16:42   
             Dictated By: KELTON CÁRDENAS MD  Electronically Signed By: 
KELTON CÁRDENAS MD on 17  Transcribed By: KATE on 17  
     COPY TO:   JALIL REAGAN           

 

 CT BRAIN WO            [facility]   [address] [place] [address]      Patient Name: DANIEL CEDENO   MR #: [id number]    : 1934 Age/Sex: 83/F  Acct #: [id number] Req 
#: [id number]  Adm Physician:     Ordered by: FABIOLA CASTILLO MD  Report #
: [id number]   Location: ER  Room/Bed:     ______________________________________
_____________________________________________________________    Procedure: [id number]
-[id number] CT/CT BRAIN WO  Exam Date: 17                            Exam Time: 
       REPORT STATUS: Signed    EXAMINATION: Head CT without contrast.     
      HISTORY:Headache.      COMPARISON:CT brain from 2017.      TECHNIQUE
: Multidetector axial images were obtained from the foramen magnum to   the 
vertex without contrast. The images were reconstructed using brain and bone   
algorithms.  Thin section brain images were reformatted into coronal and   
sagittal planes.     Intravenous contrast: None        IMAGE QUALITY: 
Acceptable.      FINDINGS:       Skull/scalp: No abnormality.       Parenchyma: 
Focal, subtle hypodensity in the posterior inferior aspect of   left lentiform 
nucleus (image 9, series 2) represents age indeterminate lacunar   infarct (
better visualized in current study).   Nonspecific few, scattered 
supratentorial white matter hypodensity are likely   related to small vessel 
ischemic changes.   No acute hemorrhage or mass.       Arteries: 
Atherosclerotic calcification in bilateral carotid siphon.       Dural sinuses: 
No abnormal density suggestive of thrombosis.        Ventricles: Moderate 
compensated dilatation due to volume loss. No   hydrocephalus.       Extra-
axial spaces: No abnormal density.         Brain volume: Normal for age.       
Craniocervical junction: No mass, Chiari malformation, or basilar   
invagination.       Sella: No mass.        Paranasal/mastoid sinuses: Imaged 
portions unremarkable.      IMPRESSION:      1. 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      2. Moderate generalized cerebral volume 
loss and mild supratentorial white   matter microvascular ischemic changes.    
  Signed by: Dr. Kendra Danielle M.D. on 2017 10:57 PM        Dictated By
: KENDRA DANIELLE MD  Electronically Signed By: KENDRA DANIELLE MD on 17 
1482  Transcribed By: MICHELET on 177       COPY TO:   FABIOLA CASTILLO MD           

 

 CT BRAIN WO            [place] [address]      Patient Name: DANIEL CEDENO   MR #: [id number]    : 1934 Age/Sex: 83/F  Acct #: [id number] Req 
#: [id number]  Adm Physician:     Ordered by: NOEMY SUGGS MD  Report #: [id number]
[id number]   Location: CT  Room/Bed:     ____________________________________________
_______________________________________________________    Procedure: [id number] 
CT/CT BRAIN WO  Exam Date: 17                            Exam Time: 1430 
      REPORT STATUS: Signed    Examination: CT BRAIN WITHOUT CONTRAST      
History:Constant headaches   Comparison studies:None      Technique:   Axial 
images were obtained from the skull base to the vertex.   Coronal and sagittal 
images reconstructed from the axial data.   Intravenous contrast: None      
Findings:      Scalp: No abnormalities.   Bones: No fractures, blastic or lytic 
lesions.      Brain sulci: Moderate volume loss for age.   Ventricles: No 
hydrocephalus.      Extra-axial space:   No abnormalities.      Parenchyma:    
No abnormal densities.    No masses, hemorrhage, acute or chronic vascular 
insults.      Sellar/suprasellar region: No abnormalities.   Craniocervical 
junction: Patent foramen magnum. No Chiari one malformation.      Incidental 
findings:    Atherosclerotic calcification of the cavernous and supraclinoid 
internal   carotid arteries.      Impression:       1.  No acute intracranial 
abnormalities.      2.  Moderate volume loss for age.      Signed by: Dr. Kala Scott M.D. on 2017 3:41 PM        Dictated By: KALA MIRELES MD  Electronically Signed By: KALA MIRELES MD on  1541  Transcribed By: MICHELET on 17 1541       COPY TO:   NOEMY SUGGS MD           

 

 CTA ABD/PEL/RUN OFF            [facility]   [address] [place] [address]      Patient Name: DANIEL CEDENO   MR #: [id number]    : 1934 Age/Sex: 83/F  Acct #: 
[id number] Req #: [id number]  Adm Physician:     Ordered by: CICI CARLOS MD  Report #: [id number]   Location: ER  Room/Bed:     __________________________
_________________________________________________________________________    
Procedure: [id number] CT/CTA ABD/PEL/RUN OFF  Exam Date: 17               
             Exam Time:        REPORT STATUS: Signed    CTA OF THE 
ABDOMINAL AORTA AND BILATERAL LOWER EXTREMITY RUNOFF      Comparison: None      
History: 82 y/o with thoracic aortic aneurysm.      Technique: Multi-detector 
CT technology was employed. Spiral images was   performed of the abdomen 
following the IV administration of 100 cc of   Isovue-370.                  IV 
CONTRAST:100mL of Isovue-370               ORAL CONTRAST: Readicat             
  RADIATION DOSE: Total DLP: 545.63 mGy*cm                       Estimated 
Effective Dose: DLP x 0.015 mSv               COMPLICATIONS: None      For 
optimization of anatomic evaluation, multiplanar reconstruction, maximum   
intensity projections, and advanced 3-D off-line postprocessing were performed 
  on a dedicated stand-alone workstation under the direct supervision of the   
interpreting physician.         FINDINGS:   Potential study limitations: None. 
     VASCULAR WITH ADVANCED 3-D OFF-LINE POSTPROCESSING:      The abdominal 
aorta is abnormal with evidence of extensive atherosclerotic   disease and 
infrarenal abdominal aortic ectasia. There is a mural thrombus to   the left of 
midline best seen on series 3, image 36      The abdominal aorta measures:    
2.1 cm at the supramesenteric segment   2.0 cm at the mesenteric segment   1.9 
cm at the renal segment   2.2 cm at the mid infrarenal segment   1.7 cm at the 
aortic bifurcation.      The celiac axis, SMA, and CAMERON are patent.  There are 
single renal arteries   bilaterally, both of which appear patent.      The 
pelvic arteries are tortuous and atherosclerotic, but otherwise normal in   
caliber and contour.      Right lower extremity: The external iliac, femoral, 
deep femoral, popliteal   arteries and trifurcation appear unremarkable with 
three-vessel runoff to the   level of the ankle where the anterior tibial 
artery appears narrow but patent.      Left lower extremity: The external iliac
, femoral, deep femoral, popliteal   arteries and trifurcation appear patent, 
however, quickly after bifurcation of   the tibioperoneal trunk and the 
peroneal artery is obstructed on series 3,   image 228 with 2 vessel runoff    
  Soft tissues: Moderate edema in the bilateral lower extremities      LOWER 
CHEST:   The visualized lung bases are clear. Sliding hiatal hernia present    
  ABDOMEN:   The liver, gallbladder, spleen, and pancreas appear normal.  The 
adrenal glands   appear normal.      Both kidneys demonstrate multiple simple 
cysts, the largest on the left kidney   in the anterior interpolar region 
measuring 6.1 cm in diameter, the largest on   the right upper pole measuring 
3.5 cm in diameter.      There is no significant retroperitoneal adenopathy.  
No free fluid or free air   within the abdomen or pelvis.      The bowel 
appears unremarkable on this non-GI contrast examination.    Diverticulosis 
present. The appendix is not well visualized      Pelvis:   Evidence of 
hysterectomy. The ovaries are visualized.   The bladder and rectum are 
unremarkable.         IMPRESSION:   1. Severe atherosclerotic disease of the 
thoracoabdominal aorta and branches.   2. Soft plaque/thrombus in the 
infrarenal abdominal aorta which appears   slightly ectatic.   3. Left peroneal 
artery is obstructed just after the tibioperoneal trunk   bifurcation with 2 
vessel runoff.   4. Right lower extremity three-vessel runoff.   5. Bilateral 
renal cystic lesions, simple in appearance.   6. Diverticulosis without 
evidence of diverticulitis.   7. Severe levorotoscoliosis of the thoracolumbar 
spine      Signed by: Dr. Ramy Leonard M.D. on 2017 12:47 AM        
Dictated By: RAMY VILLATORO MD  Electronically Signed By: RAMY DUFFY MD on 17  Transcribed By: MICHELET on 17       COPY 
TO:   CICI CARLOS MD

## 2018-04-08 NOTE — XMS REPORT
Clinical Summary

 Created on: 2018



Daniel Arthur

External Reference #: [id number]

: 1934

Sex: Female



Demographics







 Address  [address] [place]  [address]

 

 Home Phone  [phone]

 

 Preferred Language  English

 

 Marital Status  

 

 Zoroastrian Affiliation  Unknown

 

 Race  White

 

 Ethnic Group  Non-





Author







 Author  [facility]

 

 Organization  [facility]

 

 Address  Unknown

 

 Phone  Unavailable







Support







 Name  Relationship  Address  Phone

 

 Brooke Brown  Unknown  [phone]







Care Team Providers







 Care Team Member Name  Role  Phone

 

 Josh Cunningham MD  PCP  Unavailable







Allergies







    



  Active Allergy   Reactions   Severity   Noted Date   Comments

 

    



  Levofloxacin   Swelling    2017 

 

    



  Sulfa (Sulfonamide     2017 



  Antibiotics)    







Current Medications







      



  Prescription   Sig.   Disp.   Refills   Start   End Date   Status



      Date  

 

      



  aspirin (ECOTRIN) 81 MG   Take 81 mg by mouth.       Active



  enteric coated tablet      

 

      



  amLODIPine (NORVASC) 5 mg   Take 5 mg by mouth once    3   20    Active



  tablet   daily.     17  

 

      



  clonIDINE HCl (CATAPRES)      20    Active



  0.2 MG tablet      17  

 

      



  levothyroxine (SYNTHROID,   TAKE 1 TABLET BY MOUTH ON    3   04/15/20    
Active



  LEVOXYL) 50 mcg tablet   AN EMPTY STOMACH     17  

 

      



  metoprolol succinate XL   Take 100 mg by mouth once    3   20    Active



  (TOPROL-XL) 100 mg 24 hr   daily.     17  



  tablet      

 

      



  pantoprazole (PROTONIX)   Take 40 mg by mouth once    3   20    Active



  40 MG EC tablet   daily.     17  

 

      



  HYDROcodone-acetaminophen   Take 1 tablet by mouth       Active



  (NORCO) 5-325 mg per   every 6 (six) hours as     



  tablet   needed for moderate pain.     







Active Problems







 



  Problem   Noted Date

 

 



  GERD (gastroesophageal reflux disease) 

 

 



  Substernal chest pain 







Encounters







    



  Date   Type   Specialty   Care Team   Description

 

    



  2017   Documentation   Gastroenterology   Chester Dodge MD 

 

    



  2017   [facility]   Radiology   Chester Dodge   Substernal 
chest pain



   Encounter    MD 

 

    



  2017   [facility]   Radiology   Chester Dodge   Substernal 
chest pain



   Encounter    MD 

 

    



  2017   Office Visit   Gastroenterology   Chester Dodge   
Substernal chest pain



     MD   (Primary Dx);



      Gastroesophageal reflux



      disease, esophagitis



      presence not specified



after 2017



Family History







   



  Relation   Name   Status   Comments

 

   



  Father     

 

   



  Mother     







Social History







    



  Tobacco Use   Types   Packs/Day   Years Used   Date

 

    



  Former Smoker    









   



  Alcohol Use   Drinks/Week   oz/Week   Comments

 

   



  No   









 



  Sex Assigned at Birth   Date Recorded

 

 



  Not on file 







Last Filed Vital Signs







  



  Vital Sign   Reading   Time Taken

 

  



  Blood Pressure   212/77   2017  2:08 PM CDT

 

  



  Pulse   90   2017  2:08 PM CDT

 

  



  Temperature   36.4   C (97.5   F)   2017  2:08 PM CDT

 

  



  Respiratory Rate   -   -

 

  



  Oxygen Saturation   -   -

 

  



  Inhaled Oxygen   -   -



  Concentration  

 

  



  Weight   51.3 kg (113 lb)   2017  2:08 PM CDT

 

  



  Height   -   -

 

  



  Body Mass Index   -   -







Plan of Treatment







   



  Health Maintenance   Due Date   Last Done   Comments

 

   



  ZOSTER VACCINE   1994  

 

   



  PNEUMOCOCCAL   1999  



  POLYSACCHARIDE VACCINE   



  AGE 65 AND OVER   

 

   



  PNEUMOCOCCAL-13   1999  

 

   



  INFLUENZA VACCINE   2018  







Results

* US Abdomen Complete (2017 12:18 PM)





 



  Specimen   Performing Laboratory

 

 



    [facility]



   [address] [place] [address]









 Narrative

 

 



EXAM: US ABDOMEN COMPLETE



 



CLINICAL DATA:R07.2 Precordial pain, ABDOMINAL PAIN



 



COMPARISON: Yet



 



IMPRESSION:



 



LIVER:The liver demonstrates normal echogenicity without focal mass or 
intrahepatic biliary ductal dilatation.



 



GALLBLADDER:The gallbladder is without evidence of calculi. The 
gallbladder wall is not thickened and there is no pericholecystic fluid.



 



CBD: 2.3 mm, within normal limits.



 



MPV:Demonstrated normal hepatopedal flow.0.98 cm diameter 



 



 



PANCREAS:The partially visualized portions of the pancreas are within 
normal limits.



 



SPLEEN:.Spleen is not enlarged.7.0 cm.



 



 



 



RIGHT KIDNEY: Right kidney demonstrated no mass or obstruction The right kidney 
measures 9.2cm. There are 2 renal cysts.



 



LEFT KIDNEY: Left kidney demonstrated no mass or obstruction The left kidney 
measures 7.7cm. There are 3 renal cysts with the largest measuring 5.7 cm..



 



 



 



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 
maximum diameter of 2.0 cm..



 



IVC:The visualized portions of the inferior vena cava are unremarkable.



 



ASCITES: No abnormal abdominal fluid collections are visualized. There is no 
evidence of ascites.



 



PLEURAL EFFUSION:There are no pleural effusions.



 



 



 



 



 



[facility]-[id number]



 









 Procedure Note

 

 



 Interface, Radiology Results Incoming - 2017  3:25 PM CDT



EXAM: US ABDOMEN COMPLETE



CLINICAL DATA:  R07.2 Precordial pain, ABDOMINAL PAIN



COMPARISON: Yet



IMPRESSION:



LIVER:  The liver demonstrates normal echogenicity without focal mass or 
intrahepatic biliary ductal dilatation.



  GALLBLADDER:  The gallbladder is without evidence of calculi. The gallbladder 
wall is not thickened and there is no pericholecystic fluid.



  CBD: 2.3 mm, within normal limits.



  MPV:  Demonstrated normal hepatopedal flow.  0.98 cm diameter 





PANCREAS:  The partially visualized portions of the pancreas are within normal 
limits.



SPLEEN:.  Spleen is not enlarged.  7.0 cm.







RIGHT KIDNEY: Right kidney demonstrated no mass or obstruction The right kidney 
measures 9.2cm. There are 2 renal cysts.



LEFT KIDNEY: Left kidney demonstrated no mass or obstruction The left kidney 
measures 7.7cm. There are 3 renal cysts with the largest measuring 5.7 cm..







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 
maximum diameter of 2.0 cm..

 

IVC:  The visualized portions of the inferior vena cava are unremarkable.



ASCITES: No abnormal abdominal fluid collections are visualized. There is no 
evidence of ascites.



PLEURAL EFFUSION:  There are no pleural effusions.





 





[facility]-[id number]







* FL Esophagram Complete (2017  4:00 PM)





 



  Specimen   Performing Laboratory

 

 



   [facility]



   [address] [place] [address]









 Narrative

 

 



EXAMINATION:FL ESOPHAGRAM COMPLETE



 



CLINICAL HISTORY:R07.2 Precordial pain, Chest pain



 



COMPARISON:None.



 



Fluoroscopy time: 1.3 minute.



 



FINDINGS: 



The patient swallowed air producing granules and barium without difficulty. The 
esophagus demonstrates frequent tertiary contractions in the distal esophagus. 
No stricture or suspicious filling defect is seen. 



 



There is a moderate sliding hiatal hernia. 



 



 



IMPRESSION:



 



Moderate hiatal hernia.



 



Prominent tertiary contractions.



 



[facility]-[id number]



 









 Procedure Note

 

 



 Interface, Radiology Results Incoming - 2017  4:29 PM CDT



EXAMINATION:  FL ESOPHAGRAM COMPLETE



CLINICAL HISTORY:  R07.2 Precordial pain, Chest pain



COMPARISON:  None.



Fluoroscopy time: 1.3 minute.



FINDINGS: 

The patient swallowed air producing granules and barium without difficulty. The 
esophagus demonstrates frequent tertiary contractions in the distal esophagus. 
No stricture or suspicious filling defect is seen. 



There is a moderate sliding hiatal hernia. 





IMPRESSION:



Moderate hiatal hernia.



Prominent tertiary contractions.



[facility]-[id number]







after 2017



Insurance







     



  Payer   Benefit   Subscriber ID   Type   Phone   Address



   Plan /    



   Group    

 

     



  MEDICARE   MEDICARE   xxxxxxxxxxx   Medicare    [place], [place]



   PART A AND    



   B    

 

     



  GEHA   GEHA MED   xxxxxxxxxxxx   Commercial  



   SUPPLEMENT    









     



  Guarantor Name   Account   Relation to   Date of   Phone   Billing Address



   Type   Patient   Birth  

 

     



  DANIEL ARTHUR   Personal/F   Self   1934   Home:   [address]     [phone]   [place] [id number]

## 2018-04-08 NOTE — DIAGNOSTIC IMAGING REPORT
EXAM: CT Chest WITHOUT contrast 4/8/2018 3:49 PM

INDICATION:      

\S\R/O HILAR MASS OR PNEUMONIA

\[id number]

\S\[id number]

\S\Y

COMPARISON: CTA abdomen and pelvis 12/1/2017 and CT abdomen and pelvis

7/30/2017



TECHNIQUE:

Chest was scanned utilizing a multidetector helical scanner from the lung apex

through the level of the adrenal glands without administration of IV contrast.

Absence of intravenous contrast decreases sensitivity for detection of

lymphadenopathy and vascular pathology. Coronal and sagittal reformations were

obtained.  Routine protocol was performed.

           IV CONTRAST: None



COMPLICATIONS: None



RADIATION DOSE:

     Total DLP: 361 mGy*cm

     Estimated effective dose: (DLP x 0.015 x size factor) mSv

     CTDIvol has been reviewed. It is below the limits set by the Radiation

Protocol Committee (RPC).



FINDINGS:



LINES/ TUBES: None.



LUNGS AND AIRWAYS:  Advanced bilateral centrilobular emphysema. Diffuse

bilateral alveolar opacities suggestive of pulmonary edema. Bilateral lower

lobes reticular consolidation, left greater than right, new since prior exam.

Mild bilateral peribronchial wall thickening.



PLEURA: Low-attenuation bilateral pleural effusions, small on the right and a

small to moderate on the left.



HEART AND MEDIASTINUM: The thyroid gland is normal.  Few enlarged noncalcified

mediastinal lymph nodes, measuring up to 1.2 cm.  The left atrium and left

ventricle are mildly enlarged. There is no pericardial effusion.  Coronary

artery calcifications.  The thoracic aorta normal in caliber and associated

with moderate atherosclerotic calcifications. The main pulmonary artery is

normal in caliber measuring 2.6 cm in diameter (normal up to 3 cm). The left

pulmonary artery is mildly enlarged measuring 2.4 cm in diameter (normal up to

2.2 cm). The right pulmonary artery is mildly enlarged measuring 2.4 cm in

diameter (normal up to 2.2 cm).



Large hiatal hernia.



UPPER ABDOMEN: Both kidneys are atrophic and contain multiple simple cysts, the

largest on the left kidney in the anterior interpolar region measuring 6.1 cm

in diameter, the largest on the right upper pole measuring 3.5 cm in diameter,

unchanged.



BONES: Scoliosis with multilevel degenerative changes of the thoracic spine.



SOFT TISSUES: Anasarca.



IMPRESSION: 

1. Mild enlargement of the heart with associated pulmonary edema and bilateral

pleural effusions.



2. Bilateral lower lobes, left greater than right, reticular consolidations are

new since prior CT and concerning for atypical pneumonia.



3. The prominent left hilar correlates with an enlarged left pulmonary artery

likely due to pulmonary hypertension.



4. Few enlarged mediastinal lymph nodes, likely reactive.



5. Advanced bilateral emphysema.



Signed by: Dr. Marisol Salgado M.D. on 4/8/2018 6:08 PM

## 2018-04-09 VITALS — SYSTOLIC BLOOD PRESSURE: 122 MMHG | DIASTOLIC BLOOD PRESSURE: 58 MMHG

## 2018-04-09 VITALS — DIASTOLIC BLOOD PRESSURE: 58 MMHG | SYSTOLIC BLOOD PRESSURE: 122 MMHG

## 2018-04-09 VITALS — DIASTOLIC BLOOD PRESSURE: 53 MMHG | SYSTOLIC BLOOD PRESSURE: 111 MMHG

## 2018-04-09 VITALS — DIASTOLIC BLOOD PRESSURE: 72 MMHG | SYSTOLIC BLOOD PRESSURE: 168 MMHG

## 2018-04-09 VITALS — SYSTOLIC BLOOD PRESSURE: 154 MMHG | DIASTOLIC BLOOD PRESSURE: 69 MMHG

## 2018-04-09 VITALS — SYSTOLIC BLOOD PRESSURE: 153 MMHG | DIASTOLIC BLOOD PRESSURE: 70 MMHG

## 2018-04-09 VITALS — DIASTOLIC BLOOD PRESSURE: 76 MMHG | SYSTOLIC BLOOD PRESSURE: 136 MMHG

## 2018-04-09 VITALS — SYSTOLIC BLOOD PRESSURE: 136 MMHG | DIASTOLIC BLOOD PRESSURE: 56 MMHG

## 2018-04-09 LAB
ANION GAP SERPL CALC-SCNC: 13.1 MMOL/L (ref 8–16)
ANISOCYTOSIS BLD QL SMEAR: (no result)
BASOPHILS # BLD AUTO: 0 10*3/UL (ref 0–0.1)
BASOPHILS NFR BLD AUTO: 0.3 % (ref 0–1)
BUN SERPL-MCNC: 19 MG/DL (ref 7–26)
BUN/CREAT SERPL: 24 (ref 6–25)
CALCIUM SERPL-MCNC: 8.4 MG/DL (ref 8.4–10.2)
CHLORIDE SERPL-SCNC: 102 MMOL/L (ref 98–107)
CK MB SERPL-MCNC: 0.9 NG/ML (ref 0–5)
CK MB SERPL-MCNC: 1.1 NG/ML (ref 0–5)
CK SERPL-CCNC: 15 IU/L (ref 29–168)
CK SERPL-CCNC: 17 IU/L (ref 29–168)
CO2 SERPL-SCNC: 30 MMOL/L (ref 22–29)
DEPRECATED NEUTROPHILS # BLD AUTO: 7.3 10*3/UL (ref 2.1–6.9)
EGFRCR SERPLBLD CKD-EPI 2021: > 60 ML/MIN (ref 60–?)
EOSINOPHIL # BLD AUTO: 0.1 10*3/UL (ref 0–0.4)
EOSINOPHIL NFR BLD AUTO: 0.6 % (ref 0–6)
ERYTHROCYTE [DISTWIDTH] IN CORD BLOOD: 29.4 % (ref 11.7–14.4)
GLUCOSE SERPLBLD-MCNC: 105 MG/DL (ref 74–118)
HCT VFR BLD AUTO: 25.7 % (ref 34.2–44.1)
HGB BLD-MCNC: 7.5 G/DL (ref 12–16)
HYPOCHROMIA BLD QL SMEAR: (no result)
IRON SATN MFR SERPL: 9 % (ref 15–50)
IRON SERPL-MCNC: 17 UG/DL (ref 50–170)
LYMPHOCYTES # BLD: 1.3 10*3/UL (ref 1–3.2)
LYMPHOCYTES NFR BLD AUTO: 13.2 % (ref 18–39.1)
MCH RBC QN AUTO: 20.3 PG (ref 28–32)
MCHC RBC AUTO-ENTMCNC: 29.2 G/DL (ref 31–35)
MCV RBC AUTO: 69.5 FL (ref 81–99)
MICROCYTES BLD QL SMEAR: SLIGHT
MONOCYTES # BLD AUTO: 1 10*3/UL (ref 0.2–0.8)
MONOCYTES NFR BLD AUTO: 10.5 % (ref 4.4–11.3)
NEUTS SEG NFR BLD AUTO: 75 % (ref 38.7–80)
PLAT MORPH BLD: NORMAL
PLATELET # BLD AUTO: 320 X10E3/UL (ref 140–360)
PLATELET # BLD EST: ADEQUATE 10*3/UL
POIKILOCYTOSIS BLD QL SMEAR: SLIGHT
POTASSIUM SERPL-SCNC: 4.1 MMOL/L (ref 3.5–5.1)
RBC # BLD AUTO: 3.7 X10E6/UL (ref 3.6–5.1)
RBC MORPH BLD: (no result)
SODIUM SERPL-SCNC: 141 MMOL/L (ref 136–145)
TARGETS BLD QL SMEAR: (no result)
TIBC SERPL-MCNC: 188 UG/DL (ref 261–478)
TRANSFERRIN SERPL-MCNC: 134 MG/DL (ref 180–382)

## 2018-04-09 PROCEDURE — 30233N1 TRANSFUSION OF NONAUTOLOGOUS RED BLOOD CELLS INTO PERIPHERAL VEIN, PERCUTANEOUS APPROACH: ICD-10-PCS | Performed by: INTERNAL MEDICINE

## 2018-04-09 RX ADMIN — IPRATROPIUM BROMIDE AND ALBUTEROL SULFATE SCH ML: .5; 2.5 SOLUTION RESPIRATORY (INHALATION) at 13:00

## 2018-04-09 RX ADMIN — ENOXAPARIN SODIUM SCH MG: 60 INJECTION SUBCUTANEOUS at 00:16

## 2018-04-09 RX ADMIN — LEVOTHYROXINE SODIUM SCH MCG: 88 TABLET ORAL at 05:13

## 2018-04-09 RX ADMIN — IPRATROPIUM BROMIDE AND ALBUTEROL SULFATE SCH ML: .5; 2.5 SOLUTION RESPIRATORY (INHALATION) at 01:10

## 2018-04-09 RX ADMIN — ALLOPURINOL SCH MG: 100 TABLET ORAL at 09:01

## 2018-04-09 RX ADMIN — FUROSEMIDE SCH MG: 10 INJECTION, SOLUTION INTRAMUSCULAR; INTRAVENOUS at 08:59

## 2018-04-09 RX ADMIN — IRBESARTAN SCH MG: 150 TABLET ORAL at 09:00

## 2018-04-09 RX ADMIN — SODIUM CHLORIDE SCH GM: 9 INJECTION, SOLUTION INTRAVENOUS at 16:46

## 2018-04-09 RX ADMIN — ASPIRIN 81 MG CHEWABLE TABLET SCH MG: 81 TABLET CHEWABLE at 09:00

## 2018-04-09 RX ADMIN — IPRATROPIUM BROMIDE AND ALBUTEROL SULFATE SCH ML: .5; 2.5 SOLUTION RESPIRATORY (INHALATION) at 07:00

## 2018-04-09 RX ADMIN — IRBESARTAN SCH MG: 150 TABLET ORAL at 16:50

## 2018-04-09 RX ADMIN — IPRATROPIUM BROMIDE AND ALBUTEROL SULFATE SCH ML: .5; 2.5 SOLUTION RESPIRATORY (INHALATION) at 18:40

## 2018-04-09 RX ADMIN — PANTOPRAZOLE SODIUM SCH MG: 40 TABLET, DELAYED RELEASE ORAL at 09:20

## 2018-04-09 RX ADMIN — CLONIDINE HYDROCHLORIDE SCH MG: 0.2 TABLET ORAL at 21:02

## 2018-04-09 RX ADMIN — METOPROLOL SUCCINATE SCH MG: 50 TABLET, EXTENDED RELEASE ORAL at 09:01

## 2018-04-09 RX ADMIN — HYDROCODONE BITARTRATE AND ACETAMINOPHEN PRN EA: 5; 325 TABLET ORAL at 21:30

## 2018-04-09 RX ADMIN — PRAMIPEXOLE DIHYDROCHLORIDE SCH MG: 0.25 TABLET ORAL at 21:02

## 2018-04-09 RX ADMIN — ENOXAPARIN SODIUM SCH MG: 60 INJECTION SUBCUTANEOUS at 12:12

## 2018-04-09 RX ADMIN — AMLODIPINE BESYLATE SCH MG: 10 TABLET ORAL at 09:01

## 2018-04-09 RX ADMIN — MEGESTROL ACETATE SCH MG: 40 TABLET ORAL at 09:00

## 2018-04-09 RX ADMIN — CLONIDINE HYDROCHLORIDE SCH MG: 0.2 TABLET ORAL at 16:00

## 2018-04-09 RX ADMIN — CLONIDINE HYDROCHLORIDE SCH MG: 0.2 TABLET ORAL at 09:00

## 2018-04-09 RX ADMIN — FUROSEMIDE SCH MG: 10 INJECTION, SOLUTION INTRAMUSCULAR; INTRAVENOUS at 16:46

## 2018-04-09 NOTE — DIAGNOSTIC IMAGING REPORT
EXAMINATION:  CHEST SINGLE (PORTABLE)    



INDICATION:           Congestive heart failure 



COMPARISON:  4/8/2018

     

FINDINGS:

TUBES and LINES:  None.



LUNGS:  Lungs are not well inflated.  There are confluent left lung base

atelectasis.   There is mild prominence of the central pulmonary vasculature,

consistent with pulmonary venous congestion. Interlobular septi thickening

present.



PLEURA:  No pleural effusion or pneumothorax.



HEART AND MEDIASTINUM:  The cardiomediastinal silhouette is unremarkable. There

are atherosclerotic calcifications within the aorta.



BONES AND SOFT TISSUES:  No acute osseous lesion.  Soft tissues are

unremarkable.



UPPER ABDOMEN: No free air under the diaphragm.    



IMPRESSION: 

Findings are compatible with fluid overload and persistent left lung base

atelectasis.





Signed by: Dr. Ramy Leonard M.D. on 4/9/2018 6:58 AM

## 2018-04-09 NOTE — CONSULTATION
DATE OF CONSULTATION:  April 09, 2018 



CARDIOLOGY CONSULTATION 



REQUESTING PHYSICIAN:  Dr. Long Cunningham 



REASON FOR CONSULTATION:  Acute-on-chronic diastolic heart failure.



HISTORY OF PRESENT ILLNESS:  This is an 83-year-old woman with a history of 

chronic diastolic heart failure, history of DVT/PE, hypertension and COPD, 

who presented with complaint of worsening shortness of breath.  The patient 

reports she was in her usual state of health until Friday when she began 

developing shortness of breath that was progressively worse.  She presented 

to the ER for further evaluation.  The patient endorses chronic lower 

extremity edema that is unchanged but does note that she has been having 

symptoms consistent with orthopnea and PND as well as dyspnea on exertion 

at approximately 10 feet.  She denied any chest pain or palpitations.  Of 

note, she recently traveled to [place] on Tuesday for a vacation.



REVIEW OF SYSTEMS:   Negative, except as per HPI.



PAST MEDICAL HISTORY

1. Chronic diastolic heart failure.

2. Hypertension.

3. History of DVT/PE.

4. COPD.



PAST SURGICAL HISTORY

1. Hysterectomy.

2. Tubal ligation.

3. Appendectomy. 



SOCIAL HISTORY:  Prior smoker.  No alcohol.



FAMILY HISTORY:  Noncontributory.



PHYSICAL EXAMINATION

VITAL SIGNS:  Temperature 98.3 degrees, pulse 120, respiratory rate 18, 

blood pressure 153/70, oxygen saturation 100% on 2 L nasal cannula.

GENERAL:  Elderly woman in no acute distress.  Frail appearing, cachectic.

HEENT:  Normocephalic and atraumatic.  Pupils are equal with no scleral 

icterus. 

NECK:  Supple.  No thyromegaly or cervical lymphadenopathy.  No carotid 

bruit.

LUNGS:  Decreased breath sounds at the bases with crackles at the bases as 

well.  No wheezes appreciated.  

CARDIOVASCULAR:  Normal rate, regular rhythm.  No murmur.  Normal S1 and 

S2.  

ABDOMEN:  Soft.  Nontender. 

EXTREMITIES:  There is 2+ pitting edema bilaterally.  



CARDIAC MEDICATION:  Amlodipine 10 mg p.o. daily.



LABS:  WBC 9.76, hemoglobin 7.5, hematocrit 25.7, platelets 320.  Sodium 

141, potassium 4.1, chloride 102, CO2 30, BUN 19, creatinine 0.79.  

Troponin 0.001.  .  INR 1.05.  



CT CHEST:  Mild enlargement of the heart with associated pulmonary edema 

and bilateral pleural effusions.  Bilateral lower lobe, left greater than 

right, reticular consolidations are new since prior CT concerning for 

atypical pneumonia.  Prominent left hilar correlates with enlarged left 

pulmonary artery likely due to pulmonary hypertension.  A few enlarged 

mediastinal lymph nodes likely reactive.  Advanced bilateral emphysema.  



EKG:  Normal sinus rhythm. 



IMPRESSION 

1. Acute-on-chronic diastolic heart failure.

2. Atypical pneumonia suggested by CT chest.

3. Chronic obstructive pulmonary disease/emphysema.

4. Hypertension.

5. History of deep vein thrombosis/pulmonary embolism.

6. Hypothyroidism.

7. Chronic kidney disease.



RECOMMENDATIONS:  Scheduled intravenous furosemide for diuresis.  Continue 

current cardiac medications otherwise.  Patient is not on anticoagulation 

as an outpatient due to poor nutritional status.  Patient had previously 

been on warfarin.  However, she developed supratherapeutic INR to 8.  

Warfarin was subsequently stopped due to difficulty with INR control.  If 

her nutritional status improves, which the patient indicates is now better, 

we could attempt resuming warfarin for anticoagulation.  Discussed NOACs 

with the patient, but she would prefer to attempt warfarin instead.  



Thank you for this consult.  We will continue to follow. 



 



 _________________________________

NOEMY SUGGS MD



DD:  04/09/2018 11:43

DT:  04/09/2018 13:00

Job#:  [id number] 







MOLLY

## 2018-04-09 NOTE — CONSULTATION
DATE OF CONSULTATION:  April 08, 2018 



PULMONARY CONSULTATION



CONSULTING PHYSICIAN:  Dr. Nicholson



Source of the history is the patient and the review of the previous 

admissions.  



CHIEF COMPLAINT:  Shortness of breath and cough going on for the last 3 

days progressively getting worse.  



HPI:  Ms. Arthur is an 83-year-old female who presented to the emergency 

room with worsening shortness of breath, cough, dyspnea on exertion, and 

leg edema going on for last 3 days progressively getting worse.  She 

reports that she received Lasix in the emergency room and has been feeling 

better.  She has a 30-35 pack year smoking history, and has been diagnosed 

with COPD.  She denies any chest pain, nausea or vomiting.  She reports 

wheezing, dyspnea on exertion and paroxysmal nocturnal dyspnea.



REVIEW OF SYSTEMS

GENERAL:  Denies any fever or chills. 

HEENT:  Denies any head trauma.  .  ENT:  Denies any headache.  

CV:  Denies any chest pain.  

RESPIRATORY:  Shortness of breath and wheezing.  The rest of the review 

systems are negative except as in HPI.   



PAST MEDICAL HISTORY:  Hypertension, hypothyroidism, COPD, history of DVT 

and PE in the past, hypertension, chronic pain.



PAST SURGICAL HISTORY:  Hysterectomy, tubal ligation and appendectomy.



FAMILY HISTORY AND SOCIAL HISTORY:  A 35-pack year smoking history.  

Stopped in 1990s.  Denies any alcohol use.



PHYSICAL EXAMINATION

VITAL SIGNS:  Temperature 96.4, pulse of 90, blood pressure 178/76, 

respiratory rate is 20-22, O2 sat 92% on 3 L.  

SKIN:  Warm and dry. 

HEENT:  Atraumatic and normocephalic.  Pupils reactive.

CHEST:  Markedly reduced air entry bilaterally and wheezing.

HEART:  S1 and S2 audible.

ABDOMEN:  Soft, nontender and nondistended.

EXTREMITIES:  Bilateral pedal edema.

NEUROLOGIC:  Awake and alert.



LABS:  White count of 11,000, hemoglobin 9.2 and platelets 453,000.  

Chemistry:  Sodium 139, potassium 5.9, chloride 102, bicarb 26, BUN 22, 

creatinine 0.85, AST 56.  .7.  Albumin 2.6.  TSH 6.93. CT of the 

chest was done in the emergency room.  I have reviewed the images showing 

bilateral pleural effusion, right more than the left and increased 

congestion.  Possible consolidation on the left lower lobe as well, and 

also showing emphysema.  INR is 1.05, PT 12.9.



ASSESSMENT AND PLAN:  Ms. Arthur is an 83-year-old female who presented 

with worsening shortness of breath.  Patient has bilateral pleural 

effusion, history of chronic obstructive pulmonary disease, emphysema on 

chest computerized tomography and ex-smoker.  Seems like a combination of 

acute-and-chronic diastolic heart failure along with possible pneumonia.  

She has underlying chronic obstructive pulmonary disease.  However, she 

reports that she did not have any problem breathing in the past.



IMPRESSION 

1.  Acute-on-chronic diastolic heart failure.

2.  Bilateral pleural effusion likely due to heart failure.



3.  Reported Chronic obstructive pulmonary disease and emphysema on 

computerized tomography of chest.

4.  Ex-smoker.

5.  History of deep venous thrombosis, currently the INR is normal.  I am 

unsure if she was on any new or oral anticoagulant like Eliquis or Xarelto. 

 It is not on her medication list.

6.  Hypothyroidism.  



PLAN

1.  Patient has received 1 dose of Lasix in the emergency room.  I will 

start the patient on Lasix IV 40 mg daily.  Check labs.  

2.  Continue the patient on Lovenox.  I am unsure if she was on any other 

coagulant.  

3.  Continue the patient on DuoNeb nebulizer treatments.  



Thank you for this consult.  











DD:  04/08/2018 19:55

DT:  04/09/2018 08:56

Job#:  [id number]

## 2018-04-10 VITALS — SYSTOLIC BLOOD PRESSURE: 102 MMHG | DIASTOLIC BLOOD PRESSURE: 50 MMHG

## 2018-04-10 VITALS — DIASTOLIC BLOOD PRESSURE: 57 MMHG | SYSTOLIC BLOOD PRESSURE: 112 MMHG

## 2018-04-10 VITALS — SYSTOLIC BLOOD PRESSURE: 114 MMHG | DIASTOLIC BLOOD PRESSURE: 55 MMHG

## 2018-04-10 VITALS — SYSTOLIC BLOOD PRESSURE: 111 MMHG | DIASTOLIC BLOOD PRESSURE: 53 MMHG

## 2018-04-10 VITALS — DIASTOLIC BLOOD PRESSURE: 53 MMHG | SYSTOLIC BLOOD PRESSURE: 113 MMHG

## 2018-04-10 VITALS — SYSTOLIC BLOOD PRESSURE: 113 MMHG | DIASTOLIC BLOOD PRESSURE: 53 MMHG

## 2018-04-10 VITALS — DIASTOLIC BLOOD PRESSURE: 56 MMHG | SYSTOLIC BLOOD PRESSURE: 110 MMHG

## 2018-04-10 LAB
ANION GAP SERPL CALC-SCNC: 13.8 MMOL/L (ref 8–16)
BUN SERPL-MCNC: 22 MG/DL (ref 7–26)
BUN/CREAT SERPL: 23 (ref 6–25)
CALCIUM SERPL-MCNC: 8.6 MG/DL (ref 8.4–10.2)
CHLORIDE SERPL-SCNC: 95 MMOL/L (ref 98–107)
CO2 SERPL-SCNC: 34 MMOL/L (ref 22–29)
EGFRCR SERPLBLD CKD-EPI 2021: 55 ML/MIN (ref 60–?)
GLUCOSE SERPLBLD-MCNC: 94 MG/DL (ref 74–118)
POTASSIUM SERPL-SCNC: 4.8 MMOL/L (ref 3.5–5.1)
SODIUM SERPL-SCNC: 138 MMOL/L (ref 136–145)

## 2018-04-10 RX ADMIN — HYDROCODONE BITARTRATE AND ACETAMINOPHEN PRN EA: 5; 325 TABLET ORAL at 22:15

## 2018-04-10 RX ADMIN — FUROSEMIDE SCH MG: 10 INJECTION, SOLUTION INTRAMUSCULAR; INTRAVENOUS at 09:52

## 2018-04-10 RX ADMIN — ENOXAPARIN SODIUM SCH MG: 60 INJECTION SUBCUTANEOUS at 00:00

## 2018-04-10 RX ADMIN — IPRATROPIUM BROMIDE AND ALBUTEROL SULFATE SCH ML: .5; 2.5 SOLUTION RESPIRATORY (INHALATION) at 19:00

## 2018-04-10 RX ADMIN — PANTOPRAZOLE SODIUM SCH MG: 40 TABLET, DELAYED RELEASE ORAL at 09:20

## 2018-04-10 RX ADMIN — ASPIRIN 81 MG CHEWABLE TABLET SCH MG: 81 TABLET CHEWABLE at 09:52

## 2018-04-10 RX ADMIN — IPRATROPIUM BROMIDE AND ALBUTEROL SULFATE SCH ML: .5; 2.5 SOLUTION RESPIRATORY (INHALATION) at 01:50

## 2018-04-10 RX ADMIN — SODIUM CHLORIDE SCH MLS/HR: 9 INJECTION, SOLUTION INTRAVENOUS at 17:36

## 2018-04-10 RX ADMIN — SODIUM CHLORIDE SCH GM: 9 INJECTION, SOLUTION INTRAVENOUS at 17:27

## 2018-04-10 RX ADMIN — FUROSEMIDE SCH MG: 10 INJECTION, SOLUTION INTRAMUSCULAR; INTRAVENOUS at 17:28

## 2018-04-10 RX ADMIN — ALLOPURINOL SCH MG: 100 TABLET ORAL at 09:52

## 2018-04-10 RX ADMIN — IPRATROPIUM BROMIDE AND ALBUTEROL SULFATE SCH ML: .5; 2.5 SOLUTION RESPIRATORY (INHALATION) at 07:00

## 2018-04-10 RX ADMIN — CLONIDINE HYDROCHLORIDE SCH MG: 0.2 TABLET ORAL at 21:15

## 2018-04-10 RX ADMIN — IPRATROPIUM BROMIDE AND ALBUTEROL SULFATE SCH ML: .5; 2.5 SOLUTION RESPIRATORY (INHALATION) at 13:00

## 2018-04-10 RX ADMIN — PRAMIPEXOLE DIHYDROCHLORIDE SCH MG: 0.25 TABLET ORAL at 21:15

## 2018-04-10 RX ADMIN — CLONIDINE HYDROCHLORIDE SCH MG: 0.2 TABLET ORAL at 16:20

## 2018-04-10 RX ADMIN — LEVOTHYROXINE SODIUM SCH MCG: 88 TABLET ORAL at 05:39

## 2018-04-10 RX ADMIN — CLONIDINE HYDROCHLORIDE SCH MG: 0.2 TABLET ORAL at 09:52

## 2018-04-10 RX ADMIN — IRBESARTAN SCH MG: 150 TABLET ORAL at 09:52

## 2018-04-10 RX ADMIN — METOPROLOL SUCCINATE SCH MG: 50 TABLET, EXTENDED RELEASE ORAL at 09:52

## 2018-04-10 RX ADMIN — AMLODIPINE BESYLATE SCH MG: 10 TABLET ORAL at 09:52

## 2018-04-10 RX ADMIN — MEGESTROL ACETATE SCH MG: 40 TABLET ORAL at 09:52

## 2018-04-10 RX ADMIN — IRBESARTAN SCH MG: 150 TABLET ORAL at 17:28

## 2018-04-10 RX ADMIN — ENOXAPARIN SODIUM SCH MG: 60 INJECTION SUBCUTANEOUS at 11:16

## 2018-04-10 NOTE — PROGRESS NOTE
DATE:  April 10, 2018 



CARDIOLOGY PROGRESS NOTE



SUBJECTIVE:  Patient denies chest pain or shortness of breath. She reports 

she has not been up and walking.



OBJECTIVE

VITAL SIGNS:  Temperature 98.2 degrees, pulse 81, respiratory rate 16, 

blood pressure 114/55, oxygen saturation 93% on 1 liter nasal cannula.

GENERAL:  Elderly woman, frail, no acute distress, cachectic.

LUNGS:  Decreased breath sounds in the bases. No wheezes or crackles. 

CARDIOVASCULAR:  Normal rate, regular rhythm. No murmur. Normal S1 and S2. 

ABDOMEN:  Soft, nontender. 

EXTREMITIES:  Trace edema.



CARDIAC MEDICATIONS

1. Enoxaparin 50 mg subcutaneous q.12 h. 

2. Furosemide 40 mg IV b.i.d. 

3. Irbesartan 150 mg p.o. b.i.d.

4. Amlodipine 10 mg p.o. daily.

5. Metoprolol succinate 100 mg p.o. daily.

6. Aspirin 81 mg p.o. daily.

7. Levothyroxine 88 mcg p.o. daily.



LABS:  Pending.



TELEMETRY:  Normal sinus rhythm.



IMPRESSION

1. Acute-on-chronic diastolic heart failure.

2. Atypical pneumonia suggested by computed tomography chest.

3. Chronic obstructive pulmonary disease/emphysema.

4. Hypertension.

5. History of deep vein thrombosis/pulmonary embolism.

6. Hypothyroidism.

7. Chronic kidney disease.



RECOMMENDATIONS:  Continue intravenous diuretics. Monitor basic metabolic 

panel daily. Continue current cardiac medications. Will request physical 

therapy evaluation. Patient is not currently on anticoagulation as an 

outpatient due to poor nutritional status and extremely labile INR. Once 

the patient recovers from her acute illness, we can attempt to resume 

warfarin now that she states her nutrition is better. We did discuss NOACs 

with the patient, but she expressed a preference for warfarin. 



Thank you for this consult. We will continue to follow.













DD:  04/10/2018 12:16

DT:  04/10/2018 12:36

Job#:  [id number]

## 2018-04-11 VITALS — SYSTOLIC BLOOD PRESSURE: 115 MMHG | DIASTOLIC BLOOD PRESSURE: 53 MMHG

## 2018-04-11 VITALS — SYSTOLIC BLOOD PRESSURE: 117 MMHG | DIASTOLIC BLOOD PRESSURE: 56 MMHG

## 2018-04-11 VITALS — SYSTOLIC BLOOD PRESSURE: 90 MMHG | DIASTOLIC BLOOD PRESSURE: 42 MMHG

## 2018-04-11 VITALS — DIASTOLIC BLOOD PRESSURE: 55 MMHG | SYSTOLIC BLOOD PRESSURE: 117 MMHG

## 2018-04-11 VITALS — DIASTOLIC BLOOD PRESSURE: 57 MMHG | SYSTOLIC BLOOD PRESSURE: 126 MMHG

## 2018-04-11 VITALS — SYSTOLIC BLOOD PRESSURE: 117 MMHG | DIASTOLIC BLOOD PRESSURE: 55 MMHG

## 2018-04-11 VITALS — DIASTOLIC BLOOD PRESSURE: 56 MMHG | SYSTOLIC BLOOD PRESSURE: 117 MMHG

## 2018-04-11 LAB
ALBUMIN SERPL-MCNC: 1.8 G/DL (ref 3.5–5)
ALBUMIN/GLOB SERPL: 0.6 {RATIO} (ref 0.8–2)
ALP SERPL-CCNC: 59 IU/L (ref 40–150)
ALT SERPL-CCNC: 14 IU/L (ref 0–55)
ANION GAP SERPL CALC-SCNC: 14.2 MMOL/L (ref 8–16)
ANISOCYTOSIS BLD QL SMEAR: (no result)
BASOPHILS # BLD AUTO: 0 10*3/UL (ref 0–0.1)
BASOPHILS NFR BLD AUTO: 0.2 % (ref 0–1)
BUN SERPL-MCNC: 31 MG/DL (ref 7–26)
BUN/CREAT SERPL: 26 (ref 6–25)
CALCIUM SERPL-MCNC: 8.2 MG/DL (ref 8.4–10.2)
CHLORIDE SERPL-SCNC: 94 MMOL/L (ref 98–107)
CO2 SERPL-SCNC: 35 MMOL/L (ref 22–29)
DEPRECATED NEUTROPHILS # BLD AUTO: 7.7 10*3/UL (ref 2.1–6.9)
EGFRCR SERPLBLD CKD-EPI 2021: 44 ML/MIN (ref 60–?)
ELLIPTOCYTES BLD QL SMEAR: SLIGHT
EOSINOPHIL # BLD AUTO: 0.3 10*3/UL (ref 0–0.4)
EOSINOPHIL NFR BLD AUTO: 2.5 % (ref 0–6)
ERYTHROCYTE [DISTWIDTH] IN CORD BLOOD: 27.9 % (ref 11.7–14.4)
GLOBULIN PLAS-MCNC: 3.1 G/DL (ref 2.3–3.5)
GLUCOSE SERPLBLD-MCNC: 86 MG/DL (ref 74–118)
HCT VFR BLD AUTO: 27.4 % (ref 34.2–44.1)
HGB BLD-MCNC: 8.1 G/DL (ref 12–16)
HYPOCHROMIA BLD QL SMEAR: SLIGHT
LYMPHOCYTES # BLD: 1 10*3/UL (ref 1–3.2)
LYMPHOCYTES NFR BLD AUTO: 10.5 % (ref 18–39.1)
MACROCYTES BLD QL SMEAR: (no result)
MCH RBC QN AUTO: 21.6 PG (ref 28–32)
MCHC RBC AUTO-ENTMCNC: 29.6 G/DL (ref 31–35)
MCV RBC AUTO: 73.1 FL (ref 81–99)
MONOCYTES # BLD AUTO: 0.8 10*3/UL (ref 0.2–0.8)
MONOCYTES NFR BLD AUTO: 7.7 % (ref 4.4–11.3)
NEUTS SEG NFR BLD AUTO: 78.3 % (ref 38.7–80)
PLAT MORPH BLD: (no result)
PLATELET # BLD AUTO: 274 X10E3/UL (ref 140–360)
PLATELET # BLD EST: ADEQUATE 10*3/UL
POTASSIUM SERPL-SCNC: 4.2 MMOL/L (ref 3.5–5.1)
RBC # BLD AUTO: 3.75 X10E6/UL (ref 3.6–5.1)
RBC MORPH BLD: (no result)
SODIUM SERPL-SCNC: 139 MMOL/L (ref 136–145)
TOXIC GRANULES BLD QL SMEAR: SLIGHT

## 2018-04-11 RX ADMIN — AMLODIPINE BESYLATE SCH MG: 10 TABLET ORAL at 08:36

## 2018-04-11 RX ADMIN — PRAMIPEXOLE DIHYDROCHLORIDE SCH MG: 0.25 TABLET ORAL at 20:56

## 2018-04-11 RX ADMIN — ASPIRIN 81 MG CHEWABLE TABLET SCH MG: 81 TABLET CHEWABLE at 08:34

## 2018-04-11 RX ADMIN — METOPROLOL SUCCINATE SCH MG: 50 TABLET, EXTENDED RELEASE ORAL at 08:35

## 2018-04-11 RX ADMIN — IPRATROPIUM BROMIDE AND ALBUTEROL SULFATE SCH ML: .5; 2.5 SOLUTION RESPIRATORY (INHALATION) at 02:15

## 2018-04-11 RX ADMIN — ALLOPURINOL SCH MG: 100 TABLET ORAL at 08:35

## 2018-04-11 RX ADMIN — IRBESARTAN SCH MG: 150 TABLET ORAL at 08:35

## 2018-04-11 RX ADMIN — CLONIDINE HYDROCHLORIDE SCH MG: 0.2 TABLET ORAL at 08:36

## 2018-04-11 RX ADMIN — SODIUM CHLORIDE SCH GM: 9 INJECTION, SOLUTION INTRAVENOUS at 15:38

## 2018-04-11 RX ADMIN — FUROSEMIDE SCH MG: 10 INJECTION, SOLUTION INTRAMUSCULAR; INTRAVENOUS at 08:33

## 2018-04-11 RX ADMIN — SODIUM CHLORIDE SCH MLS/HR: 9 INJECTION, SOLUTION INTRAVENOUS at 17:59

## 2018-04-11 RX ADMIN — CLONIDINE HYDROCHLORIDE SCH MG: 0.2 TABLET ORAL at 14:49

## 2018-04-11 RX ADMIN — IPRATROPIUM BROMIDE AND ALBUTEROL SULFATE SCH ML: .5; 2.5 SOLUTION RESPIRATORY (INHALATION) at 19:35

## 2018-04-11 RX ADMIN — CLONIDINE HYDROCHLORIDE SCH MG: 0.2 TABLET ORAL at 21:00

## 2018-04-11 RX ADMIN — IRBESARTAN SCH MG: 150 TABLET ORAL at 17:59

## 2018-04-11 RX ADMIN — ENOXAPARIN SODIUM SCH MG: 60 INJECTION SUBCUTANEOUS at 00:15

## 2018-04-11 RX ADMIN — HYDROCODONE BITARTRATE AND ACETAMINOPHEN PRN EA: 5; 325 TABLET ORAL at 21:29

## 2018-04-11 RX ADMIN — MEGESTROL ACETATE SCH MG: 40 TABLET ORAL at 08:34

## 2018-04-11 RX ADMIN — IPRATROPIUM BROMIDE AND ALBUTEROL SULFATE SCH ML: .5; 2.5 SOLUTION RESPIRATORY (INHALATION) at 11:08

## 2018-04-11 RX ADMIN — LEVOTHYROXINE SODIUM SCH MCG: 88 TABLET ORAL at 06:01

## 2018-04-11 RX ADMIN — PANTOPRAZOLE SODIUM SCH MG: 40 TABLET, DELAYED RELEASE ORAL at 08:23

## 2018-04-11 RX ADMIN — ENOXAPARIN SODIUM SCH MG: 60 INJECTION SUBCUTANEOUS at 12:33

## 2018-04-11 NOTE — PROGRESS NOTE
DATE:  April 11, 2018 



CARDIOLOGY PROGRESS NOTE



SUBJECTIVE:  Patient denies chest pain or shortness of breath. She reports 

she is able to walk the halls without any dyspnea.



OBJECTIVE

VITAL SIGNS:  Temperature 98.5 degrees, pulse 92, respiratory rate 18, 

blood pressure 115/53, oxygen saturation 92%.

GENERAL:  Elderly woman, frail, no acute distress, cachectic.

LUNGS:  Clear to auscultation bilaterally. No wheezes or crackles. 

CARDIOVASCULAR:  Normal rate, regular rhythm. No murmur. Normal S1 and S2. 

ABDOMEN:  Soft, nontender. 

EXTREMITIES:  No edema. 



CARDIAC MEDICATIONS

1. Enoxaparin 50 mg subcutaneous q.12 h. 

2. Irbesartan 150 mg p.o. b.i.d.

3. Metoprolol succinate 100 mg p.o. daily.

4. Aspirin 81 mg p.o. daily.

5. Furosemide 40 mg IV b.i.d. 

6. Levothyroxine 88 mcg p.o. daily.

7. Amlodipine 10 mg p.o. daily.

8. Clonidine 0.2 mg p.o. t.i.d. 



LABS:  WBC 9.83, hemoglobin 8.1, hematocrit 27.4, platelets 274. Sodium 

139, potassium 4.2, chloride 94, CO2 35, BUN 31, creatinine 1.18. 



TELEMETRY:  Normal sinus rhythm.



IMPRESSION

1. Acute-on-chronic diastolic heart failure.

2. Atypical pneumonia suggested by computed tomography chest. 

3. Chronic obstructive pulmonary disease/emphysema.

4. Hypertension.

5. History of deep vein thrombosis/pulmonary embolism.

6. Hypothyroidism.

7. Chronic kidney disease. 



RECOMMENDATIONS:   The patient's creatinine is slightly higher today. Stop 

diuretics. Likely resume oral diuretics tomorrow. Monitor BMP daily. 

Continue current cardiac medications otherwise. Patient is not currently on 

anticoagulation as an outpatient due to poor nutritional status and 

extremely labile INR. Patient reports her appetite is better now. Once she 

recovers from her acute illness, we can attempt to resume warfarin. NOACs 

were discussed with the patient, but she did not wish to attempt them at 

this time. 



Thank you for this consult. We will continue to follow.













DD:  04/11/2018 16:45

DT:  04/11/2018 17:21

Job#:  [id number]







[email or web address]

## 2018-04-12 VITALS — DIASTOLIC BLOOD PRESSURE: 66 MMHG | SYSTOLIC BLOOD PRESSURE: 148 MMHG

## 2018-04-12 VITALS — DIASTOLIC BLOOD PRESSURE: 55 MMHG | SYSTOLIC BLOOD PRESSURE: 109 MMHG

## 2018-04-12 VITALS — DIASTOLIC BLOOD PRESSURE: 50 MMHG | SYSTOLIC BLOOD PRESSURE: 118 MMHG

## 2018-04-12 VITALS — DIASTOLIC BLOOD PRESSURE: 67 MMHG | SYSTOLIC BLOOD PRESSURE: 144 MMHG

## 2018-04-12 VITALS — DIASTOLIC BLOOD PRESSURE: 61 MMHG | SYSTOLIC BLOOD PRESSURE: 130 MMHG

## 2018-04-12 VITALS — SYSTOLIC BLOOD PRESSURE: 130 MMHG | DIASTOLIC BLOOD PRESSURE: 61 MMHG

## 2018-04-12 VITALS — SYSTOLIC BLOOD PRESSURE: 117 MMHG | DIASTOLIC BLOOD PRESSURE: 55 MMHG

## 2018-04-12 LAB
ALBUMIN SERPL-MCNC: 2 G/DL (ref 3.5–5)
ALBUMIN/GLOB SERPL: 0.6 {RATIO} (ref 0.8–2)
ALP SERPL-CCNC: 71 IU/L (ref 40–150)
ALT SERPL-CCNC: 24 IU/L (ref 0–55)
ANION GAP SERPL CALC-SCNC: 14.5 MMOL/L (ref 8–16)
BASOPHILS # BLD AUTO: 0 10*3/UL (ref 0–0.1)
BASOPHILS NFR BLD AUTO: 0.4 % (ref 0–1)
BUN SERPL-MCNC: 28 MG/DL (ref 7–26)
BUN/CREAT SERPL: 29 (ref 6–25)
CALCIUM SERPL-MCNC: 9.1 MG/DL (ref 8.4–10.2)
CHLORIDE SERPL-SCNC: 95 MMOL/L (ref 98–107)
CO2 SERPL-SCNC: 34 MMOL/L (ref 22–29)
DEPRECATED NEUTROPHILS # BLD AUTO: 6.3 10*3/UL (ref 2.1–6.9)
EGFRCR SERPLBLD CKD-EPI 2021: 56 ML/MIN (ref 60–?)
EOSINOPHIL # BLD AUTO: 0.3 10*3/UL (ref 0–0.4)
EOSINOPHIL NFR BLD AUTO: 3.1 % (ref 0–6)
ERYTHROCYTE [DISTWIDTH] IN CORD BLOOD: 28.8 % (ref 11.7–14.4)
GLOBULIN PLAS-MCNC: 3.5 G/DL (ref 2.3–3.5)
GLUCOSE SERPLBLD-MCNC: 82 MG/DL (ref 74–118)
HCT VFR BLD AUTO: 30.1 % (ref 34.2–44.1)
HGB BLD-MCNC: 8.9 G/DL (ref 12–16)
LYMPHOCYTES # BLD: 1 10*3/UL (ref 1–3.2)
LYMPHOCYTES NFR BLD AUTO: 11.6 % (ref 18–39.1)
MCH RBC QN AUTO: 21.9 PG (ref 28–32)
MCHC RBC AUTO-ENTMCNC: 29.6 G/DL (ref 31–35)
MCV RBC AUTO: 74 FL (ref 81–99)
MONOCYTES # BLD AUTO: 0.8 10*3/UL (ref 0.2–0.8)
MONOCYTES NFR BLD AUTO: 9 % (ref 4.4–11.3)
NEUTS SEG NFR BLD AUTO: 75.5 % (ref 38.7–80)
PLATELET # BLD AUTO: 333 X10E3/UL (ref 140–360)
POTASSIUM SERPL-SCNC: 4.5 MMOL/L (ref 3.5–5.1)
RBC # BLD AUTO: 4.07 X10E6/UL (ref 3.6–5.1)
SODIUM SERPL-SCNC: 139 MMOL/L (ref 136–145)

## 2018-04-12 RX ADMIN — SODIUM CHLORIDE SCH GM: 9 INJECTION, SOLUTION INTRAVENOUS at 15:44

## 2018-04-12 RX ADMIN — IPRATROPIUM BROMIDE AND ALBUTEROL SULFATE SCH ML: .5; 2.5 SOLUTION RESPIRATORY (INHALATION) at 06:30

## 2018-04-12 RX ADMIN — LEVOTHYROXINE SODIUM SCH MCG: 88 TABLET ORAL at 05:12

## 2018-04-12 RX ADMIN — CLONIDINE HYDROCHLORIDE SCH MG: 0.2 TABLET ORAL at 15:00

## 2018-04-12 RX ADMIN — SODIUM CHLORIDE SCH MLS/HR: 9 INJECTION, SOLUTION INTRAVENOUS at 17:29

## 2018-04-12 RX ADMIN — PRAMIPEXOLE DIHYDROCHLORIDE SCH MG: 0.25 TABLET ORAL at 20:51

## 2018-04-12 RX ADMIN — MEGESTROL ACETATE SCH MG: 40 TABLET ORAL at 09:23

## 2018-04-12 RX ADMIN — ALLOPURINOL SCH MG: 100 TABLET ORAL at 09:23

## 2018-04-12 RX ADMIN — PANTOPRAZOLE SODIUM SCH MG: 40 TABLET, DELAYED RELEASE ORAL at 09:23

## 2018-04-12 RX ADMIN — ENOXAPARIN SODIUM SCH MG: 60 INJECTION SUBCUTANEOUS at 00:00

## 2018-04-12 RX ADMIN — ENOXAPARIN SODIUM SCH MG: 60 INJECTION SUBCUTANEOUS at 12:15

## 2018-04-12 RX ADMIN — IPRATROPIUM BROMIDE AND ALBUTEROL SULFATE SCH ML: .5; 2.5 SOLUTION RESPIRATORY (INHALATION) at 01:30

## 2018-04-12 RX ADMIN — IPRATROPIUM BROMIDE AND ALBUTEROL SULFATE SCH ML: .5; 2.5 SOLUTION RESPIRATORY (INHALATION) at 13:00

## 2018-04-12 RX ADMIN — AMLODIPINE BESYLATE SCH MG: 10 TABLET ORAL at 09:23

## 2018-04-12 RX ADMIN — IPRATROPIUM BROMIDE AND ALBUTEROL SULFATE SCH ML: .5; 2.5 SOLUTION RESPIRATORY (INHALATION) at 18:55

## 2018-04-12 RX ADMIN — ASPIRIN 81 MG CHEWABLE TABLET SCH MG: 81 TABLET CHEWABLE at 09:23

## 2018-04-12 RX ADMIN — CLONIDINE HYDROCHLORIDE SCH MG: 0.2 TABLET ORAL at 21:00

## 2018-04-12 RX ADMIN — ENOXAPARIN SODIUM SCH MG: 60 INJECTION SUBCUTANEOUS at 23:40

## 2018-04-12 RX ADMIN — CLONIDINE HYDROCHLORIDE SCH MG: 0.2 TABLET ORAL at 09:23

## 2018-04-12 RX ADMIN — IRBESARTAN SCH MG: 150 TABLET ORAL at 17:19

## 2018-04-12 RX ADMIN — HYDROCODONE BITARTRATE AND ACETAMINOPHEN PRN EA: 5; 325 TABLET ORAL at 20:51

## 2018-04-12 RX ADMIN — IRBESARTAN SCH MG: 150 TABLET ORAL at 09:23

## 2018-04-12 RX ADMIN — METOPROLOL SUCCINATE SCH MG: 50 TABLET, EXTENDED RELEASE ORAL at 09:22

## 2018-04-12 NOTE — PROGRESS NOTE
DATE:  April 12, 2018 



CARDIOLOGY PROGRESS NOTE



SUBJECTIVE:  The patient denies chest pain or shortness of breath.



OBJECTIVE

VITAL SIGNS:  Temperature 98.7 degrees, pulse 91, respiratory rate 20, 

blood pressure 144/67, oxygen saturation 100% on 1 liter nasal cannula.

GENERAL:  Elderly woman, frail.  No acute distress.  Cachectic.

LUNGS:  Clear to auscultation bilaterally.  No wheezes or crackles.

CARDIOVASCULAR:  Normal rate, regular rhythm, no murmur.  Normal S1 and S2.

ABDOMEN:  Soft, nontender.

EXTREMITIES:  No edema.



CARDIAC MEDICATIONS

1. Enoxaparin 50 mg subcu q.12 hours.

2. Irbesartan 150 mg p.o. b.i.d.

3. Amlodipine 10 mg p.o. daily.

4. Aspirin 81 mg p.o. daily.

5. Clonidine 0.2 mg p.o. t.i.d. 

6. Metoprolol succinate 100 mg p.o. daily.

7. Levothyroxine 88 mcg p.o. daily.



LABS:  WBC 8.33, hemoglobin 8.9, hematocrit 30.1, platelets 333.  Sodium 

139, potassium 4.5, chloride 95, CO2 34, BUN 28, creatinine 0.95.



TELEMETRY:  Normal sinus rhythm.



IMPRESSION

1. Acute-on-chronic diastolic heart failure.

2. Atypical pneumonia suggested by CT chest.

3. Chronic obstructive pulmonary disease/emphysema.

4. Hypertension.

5. History of deep vein thrombosis/pulmonary embolism.

6. Hypothyroidism.

7. Chronic kidney disease.



RECOMMENDATIONS:  Patient's creatinine has improved.  Start low dose oral 

diuretics today. Discussion was held with patient regarding 

anticoagulation.  Patient declined anticoagulation with NOACs due to cost.  

We will resume patient on warfarin 0.5 mg p.o. daily with close followup in 

the office.  Continue current cardiac medications otherwise.



Thank you for this consult.  We will continue to follow.









DD:  04/12/2018 13:52

DT:  04/12/2018 14:22

Job#:  [id number]

## 2018-04-13 VITALS — SYSTOLIC BLOOD PRESSURE: 152 MMHG | DIASTOLIC BLOOD PRESSURE: 66 MMHG

## 2018-04-13 VITALS — SYSTOLIC BLOOD PRESSURE: 149 MMHG | DIASTOLIC BLOOD PRESSURE: 67 MMHG

## 2018-04-13 VITALS — DIASTOLIC BLOOD PRESSURE: 67 MMHG | SYSTOLIC BLOOD PRESSURE: 149 MMHG

## 2018-04-13 VITALS — DIASTOLIC BLOOD PRESSURE: 61 MMHG | SYSTOLIC BLOOD PRESSURE: 129 MMHG

## 2018-04-13 LAB
DEPRECATED INR PLAS: 1.04
PROTHROMBIN TIME: 12.8 SECONDS (ref 11.9–14.5)

## 2018-04-13 RX ADMIN — ENOXAPARIN SODIUM SCH MG: 60 INJECTION SUBCUTANEOUS at 12:28

## 2018-04-13 RX ADMIN — IPRATROPIUM BROMIDE AND ALBUTEROL SULFATE SCH ML: .5; 2.5 SOLUTION RESPIRATORY (INHALATION) at 01:25

## 2018-04-13 RX ADMIN — PANTOPRAZOLE SODIUM SCH MG: 40 TABLET, DELAYED RELEASE ORAL at 08:55

## 2018-04-13 RX ADMIN — IRBESARTAN SCH MG: 150 TABLET ORAL at 08:55

## 2018-04-13 RX ADMIN — MEGESTROL ACETATE SCH MG: 40 TABLET ORAL at 08:55

## 2018-04-13 RX ADMIN — LEVOTHYROXINE SODIUM SCH MCG: 88 TABLET ORAL at 05:45

## 2018-04-13 RX ADMIN — ASPIRIN 81 MG CHEWABLE TABLET SCH MG: 81 TABLET CHEWABLE at 08:55

## 2018-04-13 RX ADMIN — CLONIDINE HYDROCHLORIDE SCH MG: 0.2 TABLET ORAL at 08:55

## 2018-04-13 RX ADMIN — IPRATROPIUM BROMIDE AND ALBUTEROL SULFATE SCH ML: .5; 2.5 SOLUTION RESPIRATORY (INHALATION) at 06:30

## 2018-04-13 RX ADMIN — METOPROLOL SUCCINATE SCH MG: 50 TABLET, EXTENDED RELEASE ORAL at 08:56

## 2018-04-13 RX ADMIN — ALLOPURINOL SCH MG: 100 TABLET ORAL at 08:56

## 2018-04-13 RX ADMIN — AMLODIPINE BESYLATE SCH MG: 10 TABLET ORAL at 08:55

## 2018-04-13 NOTE — PROGRESS NOTE
DATE:  April 13, 2018 



CARDIOLOGY PROGRESS NOTE



SUBJECTIVE:  The patient denies chest pain or shortness of breath.  She was 

advised to follow up for INR check in 1 week.



OBJECTIVE

VITAL SIGNS:  Temperature 99 degrees, pulse 99, respiratory rate 20, blood 

pressure 149/67, oxygen saturation 100% on 2 liters nasal cannula.

GENERAL:  Awake, alert, frail, elderly woman in no acute distress.  

Cachectic.

LUNGS:  Clear to auscultation bilaterally.  No wheezes or crackles.

CARDIOVASCULAR:  Normal rate, regular rhythm, no murmur.  Normal S1 and S2.

ABDOMEN:  Soft, nontender.

EXTREMITIES:  No edema.



CARDIAC MEDICATIONS

1. Enoxaparin 50 mg subcutaneous q.12 h.

2. Metoprolol succinate 100 mg p.o. daily.

3. Irbesartan 150 mg p.o. b.i.d.

4. Amlodipine 10 mg p.o. daily.

5. Clonidine 0.2 mg p.o. t.i.d. 

6. Aspirin 81 mg p.o. daily.

7. Levothyroxine 88 mcg p.o. daily.

8. Warfarin 0.5 mg p.o. daily.



LABS:  INR 1.04. 



TELEMETRY:  Normal sinus rhythm.



IMPRESSION

1. Acute-on-chronic diastolic heart failure.

2. Atypical pneumonia suggested by CT chest.

3. Chronic obstructive pulmonary disease/emphysema.

4. Hypertension.

5. History of deep vein thrombosis/pulmonary embolism.

6. Hypothyroidism.

7. Chronic kidney disease.



RECOMMENDATIONS:  After an extensive discussion with the patient regarding 

anticoagulation, she declined NOAC due to cost.  Warfarin 0.5 mg p.o. daily 

was resumed.  She was instructed to follow up in the office for INR check 

next Wednesday.  She was given a prescription for Lasix 20 mg p.o. daily 

for her acute-on-chronic diastolic heart failure.  She will need followup 

labs at the office next week as well.  Continue current cardiac medications 

otherwise.  



Thank you for this consult.  We will continue to follow.







DD:  04/13/2018 14:00

DT:  04/13/2018 14:03

Job#:  [id number]

## 2018-04-21 ENCOUNTER — HOSPITAL ENCOUNTER (INPATIENT)
Dept: HOSPITAL 88 - ER | Age: 83
LOS: 23 days | Discharge: HOME | DRG: 682 | End: 2018-05-14
Attending: INTERNAL MEDICINE | Admitting: INTERNAL MEDICINE
Payer: MEDICARE

## 2018-04-21 VITALS — SYSTOLIC BLOOD PRESSURE: 138 MMHG | DIASTOLIC BLOOD PRESSURE: 54 MMHG

## 2018-04-21 VITALS — SYSTOLIC BLOOD PRESSURE: 129 MMHG | DIASTOLIC BLOOD PRESSURE: 67 MMHG

## 2018-04-21 VITALS — WEIGHT: 103 LBS | BODY MASS INDEX: 18.25 KG/M2 | HEIGHT: 63 IN

## 2018-04-21 VITALS — DIASTOLIC BLOOD PRESSURE: 67 MMHG | SYSTOLIC BLOOD PRESSURE: 129 MMHG

## 2018-04-21 DIAGNOSIS — J18.9: ICD-10-CM

## 2018-04-21 DIAGNOSIS — Z86.711: ICD-10-CM

## 2018-04-21 DIAGNOSIS — Z88.1: ICD-10-CM

## 2018-04-21 DIAGNOSIS — B96.5: ICD-10-CM

## 2018-04-21 DIAGNOSIS — I48.91: ICD-10-CM

## 2018-04-21 DIAGNOSIS — K57.30: ICD-10-CM

## 2018-04-21 DIAGNOSIS — I95.9: ICD-10-CM

## 2018-04-21 DIAGNOSIS — N18.3: ICD-10-CM

## 2018-04-21 DIAGNOSIS — E03.9: ICD-10-CM

## 2018-04-21 DIAGNOSIS — I50.33: ICD-10-CM

## 2018-04-21 DIAGNOSIS — I13.0: ICD-10-CM

## 2018-04-21 DIAGNOSIS — Z87.891: ICD-10-CM

## 2018-04-21 DIAGNOSIS — B37.81: ICD-10-CM

## 2018-04-21 DIAGNOSIS — Z88.2: ICD-10-CM

## 2018-04-21 DIAGNOSIS — N17.9: Primary | ICD-10-CM

## 2018-04-21 DIAGNOSIS — R00.0: ICD-10-CM

## 2018-04-21 DIAGNOSIS — K55.9: ICD-10-CM

## 2018-04-21 DIAGNOSIS — E83.52: ICD-10-CM

## 2018-04-21 DIAGNOSIS — Z79.01: ICD-10-CM

## 2018-04-21 DIAGNOSIS — N30.01: ICD-10-CM

## 2018-04-21 DIAGNOSIS — D69.59: ICD-10-CM

## 2018-04-21 DIAGNOSIS — M10.9: ICD-10-CM

## 2018-04-21 DIAGNOSIS — D50.9: ICD-10-CM

## 2018-04-21 DIAGNOSIS — R07.9: ICD-10-CM

## 2018-04-21 DIAGNOSIS — J44.9: ICD-10-CM

## 2018-04-21 DIAGNOSIS — E77.8: ICD-10-CM

## 2018-04-21 DIAGNOSIS — E44.0: ICD-10-CM

## 2018-04-21 DIAGNOSIS — Z88.8: ICD-10-CM

## 2018-04-21 LAB
ANION GAP SERPL CALC-SCNC: 13.3 MMOL/L (ref 8–16)
BACTERIA URNS QL MICRO: (no result) /HPF
BASOPHILS # BLD AUTO: 0 10*3/UL (ref 0–0.1)
BASOPHILS NFR BLD AUTO: 0.5 % (ref 0–1)
BILIRUB UR QL: NEGATIVE
BUN SERPL-MCNC: 52 MG/DL (ref 7–26)
BUN/CREAT SERPL: 34 (ref 6–25)
CALCIUM SERPL-MCNC: 9.6 MG/DL (ref 8.4–10.2)
CHLORIDE SERPL-SCNC: 98 MMOL/L (ref 98–107)
CK MB SERPL-MCNC: 0.9 NG/ML (ref 0–5)
CK SERPL-CCNC: 7 IU/L (ref 29–168)
CLARITY UR: (no result)
CO2 SERPL-SCNC: 30 MMOL/L (ref 22–29)
COLOR UR: YELLOW
DEPRECATED INR PLAS: 1.15
DEPRECATED NEUTROPHILS # BLD AUTO: 5.5 10*3/UL (ref 2.1–6.9)
DEPRECATED RBC URNS MANUAL-ACNC: (no result) /HPF (ref 0–5)
EGFRCR SERPLBLD CKD-EPI 2021: 32 ML/MIN (ref 60–?)
EOSINOPHIL # BLD AUTO: 0.2 10*3/UL (ref 0–0.4)
EOSINOPHIL NFR BLD AUTO: 2.1 % (ref 0–6)
EPI CELLS URNS QL MICRO: (no result) /LPF
ERYTHROCYTE [DISTWIDTH] IN CORD BLOOD: 29.5 % (ref 11.7–14.4)
GLUCOSE SERPLBLD-MCNC: 90 MG/DL (ref 74–118)
HCT VFR BLD AUTO: 29.2 % (ref 34.2–44.1)
HGB BLD-MCNC: 8.3 G/DL (ref 12–16)
KETONES UR QL STRIP.AUTO: NEGATIVE
LEUKOCYTE ESTERASE UR QL STRIP.AUTO: (no result)
LYMPHOCYTES # BLD: 0.9 10*3/UL (ref 1–3.2)
LYMPHOCYTES NFR BLD AUTO: 11.8 % (ref 18–39.1)
MCH RBC QN AUTO: 22 PG (ref 28–32)
MCHC RBC AUTO-ENTMCNC: 28.4 G/DL (ref 31–35)
MCV RBC AUTO: 77.5 FL (ref 81–99)
MONOCYTES # BLD AUTO: 1 10*3/UL (ref 0.2–0.8)
MONOCYTES NFR BLD AUTO: 13.1 % (ref 4.4–11.3)
NEUTS SEG NFR BLD AUTO: 71.2 % (ref 38.7–80)
NITRITE UR QL STRIP.AUTO: POSITIVE
PLATELET # BLD AUTO: 554 X10E3/UL (ref 140–360)
POTASSIUM SERPL-SCNC: 5.3 MMOL/L (ref 3.5–5.1)
PROT UR QL STRIP.AUTO: (no result)
PROTHROMBIN TIME: 13.8 SECONDS (ref 11.9–14.5)
RBC # BLD AUTO: 3.77 X10E6/UL (ref 3.6–5.1)
SODIUM SERPL-SCNC: 136 MMOL/L (ref 136–145)
SP GR UR STRIP: 1.01 (ref 1.01–1.02)
UROBILINOGEN UR STRIP-MCNC: 0.2 MG/DL (ref 0.2–1)
WBC #/AREA URNS HPF: >50 /HPF (ref 0–5)

## 2018-04-21 PROCEDURE — 84550 ASSAY OF BLOOD/URIC ACID: CPT

## 2018-04-21 PROCEDURE — 97139 UNLISTED THERAPEUTIC PX: CPT

## 2018-04-21 PROCEDURE — 43239 EGD BIOPSY SINGLE/MULTIPLE: CPT

## 2018-04-21 PROCEDURE — 88305 TISSUE EXAM BY PATHOLOGIST: CPT

## 2018-04-21 PROCEDURE — 87106 FUNGI IDENTIFICATION YEAST: CPT

## 2018-04-21 PROCEDURE — 83880 ASSAY OF NATRIURETIC PEPTIDE: CPT

## 2018-04-21 PROCEDURE — 83540 ASSAY OF IRON: CPT

## 2018-04-21 PROCEDURE — 87493 C DIFF AMPLIFIED PROBE: CPT

## 2018-04-21 PROCEDURE — 87040 BLOOD CULTURE FOR BACTERIA: CPT

## 2018-04-21 PROCEDURE — 96365 THER/PROPH/DIAG IV INF INIT: CPT

## 2018-04-21 PROCEDURE — 96361 HYDRATE IV INFUSION ADD-ON: CPT

## 2018-04-21 PROCEDURE — 93005 ELECTROCARDIOGRAM TRACING: CPT

## 2018-04-21 PROCEDURE — 80048 BASIC METABOLIC PNL TOTAL CA: CPT

## 2018-04-21 PROCEDURE — 84166 PROTEIN E-PHORESIS/URINE/CSF: CPT

## 2018-04-21 PROCEDURE — 45380 COLONOSCOPY AND BIOPSY: CPT

## 2018-04-21 PROCEDURE — 82553 CREATINE MB FRACTION: CPT

## 2018-04-21 PROCEDURE — 83605 ASSAY OF LACTIC ACID: CPT

## 2018-04-21 PROCEDURE — 82728 ASSAY OF FERRITIN: CPT

## 2018-04-21 PROCEDURE — 87205 SMEAR GRAM STAIN: CPT

## 2018-04-21 PROCEDURE — 96366 THER/PROPH/DIAG IV INF ADDON: CPT

## 2018-04-21 PROCEDURE — 96376 TX/PRO/DX INJ SAME DRUG ADON: CPT

## 2018-04-21 PROCEDURE — 86920 COMPATIBILITY TEST SPIN: CPT

## 2018-04-21 PROCEDURE — 82270 OCCULT BLOOD FECES: CPT

## 2018-04-21 PROCEDURE — 82948 REAGENT STRIP/BLOOD GLUCOSE: CPT

## 2018-04-21 PROCEDURE — 83735 ASSAY OF MAGNESIUM: CPT

## 2018-04-21 PROCEDURE — 76604 US EXAM CHEST: CPT

## 2018-04-21 PROCEDURE — 87186 SC STD MICRODIL/AGAR DIL: CPT

## 2018-04-21 PROCEDURE — 80053 COMPREHEN METABOLIC PANEL: CPT

## 2018-04-21 PROCEDURE — 84100 ASSAY OF PHOSPHORUS: CPT

## 2018-04-21 PROCEDURE — 99284 EMERGENCY DEPT VISIT MOD MDM: CPT

## 2018-04-21 PROCEDURE — 84466 ASSAY OF TRANSFERRIN: CPT

## 2018-04-21 PROCEDURE — 86900 BLOOD TYPING SEROLOGIC ABO: CPT

## 2018-04-21 PROCEDURE — 83970 ASSAY OF PARATHORMONE: CPT

## 2018-04-21 PROCEDURE — 82550 ASSAY OF CK (CPK): CPT

## 2018-04-21 PROCEDURE — 84165 PROTEIN E-PHORESIS SERUM: CPT

## 2018-04-21 PROCEDURE — 71046 X-RAY EXAM CHEST 2 VIEWS: CPT

## 2018-04-21 PROCEDURE — 82306 VITAMIN D 25 HYDROXY: CPT

## 2018-04-21 PROCEDURE — 84484 ASSAY OF TROPONIN QUANT: CPT

## 2018-04-21 PROCEDURE — 36415 COLL VENOUS BLD VENIPUNCTURE: CPT

## 2018-04-21 PROCEDURE — 88312 SPECIAL STAINS GROUP 1: CPT

## 2018-04-21 PROCEDURE — 85610 PROTHROMBIN TIME: CPT

## 2018-04-21 PROCEDURE — 71045 X-RAY EXAM CHEST 1 VIEW: CPT

## 2018-04-21 PROCEDURE — 84132 ASSAY OF SERUM POTASSIUM: CPT

## 2018-04-21 PROCEDURE — 81001 URINALYSIS AUTO W/SCOPE: CPT

## 2018-04-21 PROCEDURE — 43235 EGD DIAGNOSTIC BRUSH WASH: CPT

## 2018-04-21 PROCEDURE — 86850 RBC ANTIBODY SCREEN: CPT

## 2018-04-21 PROCEDURE — 74018 RADEX ABDOMEN 1 VIEW: CPT

## 2018-04-21 PROCEDURE — 87086 URINE CULTURE/COLONY COUNT: CPT

## 2018-04-21 PROCEDURE — 85025 COMPLETE CBC W/AUTO DIFF WBC: CPT

## 2018-04-21 PROCEDURE — 94640 AIRWAY INHALATION TREATMENT: CPT

## 2018-04-21 RX ADMIN — SODIUM CHLORIDE SCH GM: 9 INJECTION, SOLUTION INTRAVENOUS at 19:57

## 2018-04-21 RX ADMIN — SODIUM CHLORIDE SCH MLS/HR: 9 INJECTION, SOLUTION INTRAVENOUS at 19:58

## 2018-04-21 RX ADMIN — PRAMIPEXOLE DIHYDROCHLORIDE SCH MG: 0.25 TABLET ORAL at 22:13

## 2018-04-21 RX ADMIN — HYDROCODONE BITARTRATE AND ACETAMINOPHEN PRN EA: 5; 325 TABLET ORAL at 22:13

## 2018-04-21 NOTE — XMS REPORT
Clinical Summary

 Created on: 2018



Daniel Alatorre

External Reference #: [id number]

: 1934

Sex: Female



Demographics







 Address  [address] [place]  [address]

 

 Home Phone  [phone]

 

 Preferred Language  English

 

 Marital Status  Unknown

 

 Pentecostalism Affiliation  Orthodox

 

 Race  White

 

 Ethnic Group  Non-





Author







 Author  [employer]

 

 [facility]

 

 Address  Unknown

 

 Phone  Unavailable







Support







 Name  Relationship  Address  Phone

 

 , Silvana Brown  Unknown  [phone]







Care Team Providers







 Care Team Member Name  Role  Phone

 

  PCP  Unavailable







Allergies







    



  Active Allergy   Reactions   Severity   Noted Date   Comments

 

    



  Sulfa (Sulfonamide   Anaphylaxis   High   2015 



  Antibiotics)    







Current Medications







      



  Prescription   Sig.   Disp.   Refills   Start   End Date   Status



      Date  

 

      



  metoprolol (TOPROL-XL)   Take 100 mg by mouth       Active



  100 MG 24 hr tablet   daily.     

 

      



  pantoprazole (PROTONIX)   Take 40 mg by mouth       Active



  40 MG tablet   daily.     

 

      



  aspirin 81 MG EC tablet   Take 81 mg by mouth       Active



   daily.     

 

      



  temazepam (RESTORIL) 30   Take 30 mg by mouth every       Active



  mg capsule   night as needed for     



   Sleep.     

 

      



  magnesium oxide 400 mg   Take by mouth. 1-3 x       Active



  Cap   daily     







Active Problems







 



  Problem   Noted Date

 

 



  Hyponatremia   2015







Encounters







    



  Date   Type   Specialty   Care Team   Description

 

    



  2017   Hospital   Cardiology   Jake Chery,   Peripheral 
vascular



   Encounter    MD   disease, unspecified



      (HCC)

 

    



  2017   Outside Orders   Central Scheduling   Jake Chery 
  Peripheral vascular



     MD   disease, unspecified



      (HCC) (Primary Dx)



after 2017



Social History







    



  Tobacco Use   Types   Packs/Day   Years Used   Date

 

    



  Former Smoker    









   



  Alcohol Use   Drinks/Week   oz/Week   Comments

 

   



  Yes   









 



  Sex Assigned at Birth   Date Recorded

 

 



  Not on file 







Last Filed Vital Signs

Not on file



Plan of Treatment





Not on file



Results

* PERIPHERAL VASCULAR REPORT - SCAN (2017  3:23 PM)

* Arterial Doppler Legs Bilateral (2017 10:08 AM)





  



  Component   Value   Ref Range

 

  



  Ejection Fraction  









 



  Specimen   Performing Laboratory

 

 



   [facility] ECHO HEART[facility] MKCKESSON CPACS









 Impressions

 

 



Right Impression



1. The common femoral, profunda femoral, superficial femoral, popliteal,



posterior tibial, peroneal and anterior tibial arteries are patent with



biphasic Doppler waveforms throughout.



2. There is > 50 % stenosis of the common femoral and proximal superficial



femoral arteries with elevated velocities of 283 cm/s and 213 cm/s.



3. The PT pressure is 158 mmHg with an DELMY of 1.01(normal range) and the DP



pressure is 137 mmHg with an DELMY of 0.86 (mild obstruction range).



4. The great toe pressure is 71 mmHg with an abnormal TBI of 0.46.



5. The digits have adequate flow by PPG waveforms.



Left Impression



1. The common femoral, superficial femoral, popliteal and peroneal arteries



are patent with biphasic Doppler waveforms throughout.



2. The posterior tibial and anterior tibial arteries have monophasic



waveforms.



3. The PT pressure is 102 mmHg with an DELMY of 0.65 (severe obstruction



range)and the DP pressure is 88 mmHg with an DELMY of 0.56 (severe obstruction



range.



4. The great toe pressure is 46 mmHg with an abnormal TBI of 0.29.



5. The digits have adequate flow by PPG waveforms.



 



 Conclusions



 



 Summary



 



 Arterial pressures and Doppler analysis were performed bilaterally. The



 arteries were adequately visualized. On the right, the common femoral,



 profunda femoral, superficial femoral, popliteal, posterior tibial,



 peroneal and anterior tibial arteries were patent with biphasic Doppler



 waveforms throughout. There was stenosis of the common femoral and



 proximal superficial femoral artery. The DELMY's were in the normal to mild



 obstruction range. The TBI was abnormal. The digits had adequate flow by



 PPG. On the left, the common femoral, superficial femoral, popliteal and



 peroneal arteries were patent with biphasic Doppler waveforms throughout.



 The posterior tibial and anterior tibial arteries had monophasic



 waveforms. The DELMY's were in the severe obstruction range. The TBI was



 abnormal and the digits had adequate flow by PPG.



 



 Signature



 



 ----------------------------------------------------------------



 Electronically signed by BIANKA Shepard MD,



 JOSIAS(Interpreting physician) on 2017 02:53 PM



 ----------------------------------------------------------------



 



Velocities are measured in cm/s ; Diameters are measured in cm



 



LE Duplex Measurements



 




Right

Left



 



 +--------------------------------------------------------------+ +-------------
------+------------------+-----------------------+ +------------------+---------
--------+-------------------------+



 !Location
! !PSV!EDV
 !Waveform ! !PSV
 !EDV!Waveform
 !



 +--------------------------------------------------------------+ +-------------
------+------------------+-----------------------+ +------------------+---------
--------+-------------------------+



 !Mid Common Femoral
! !283!
!Biphasic ! !128
 ! !Biphasic
 !



 +--------------------------------------------------------------+ +-------------
------+------------------+-----------------------+ +------------------+---------
--------+-------------------------+



 !Prox PFA
! !187!
!Biphasic ! !105
 ! !Monophasic
 !



 +--------------------------------------------------------------+ +-------------
------+------------------+-----------------------+ +------------------+---------
--------+-------------------------+



 !Prox SFA
! !213!
!Biphasic ! !108
 ! !Biphasic
 !



 +--------------------------------------------------------------+ +-------------
------+------------------+-----------------------+ +------------------+---------
--------+-------------------------+



 !Mid SFA
 ! !140!
!Biphasic ! !124
 ! !Biphasic
 !



 +--------------------------------------------------------------+ +-------------
------+------------------+-----------------------+ +------------------+---------
--------+-------------------------+



 !Dist SFA
! !97.4 !
!Biphasic ! !98.2
! !Biphasic
 !



 +--------------------------------------------------------------+ +-------------
------+------------------+-----------------------+ +------------------+---------
--------+-------------------------+



 !Prox Popliteal
! !84.1 !
!Biphasic ! !65.7
! !Biphasic
 !



 +--------------------------------------------------------------+ +-------------
------+------------------+-----------------------+ +------------------+---------
--------+-------------------------+



 !Dist Popliteal
! !103!
!Biphasic ! !61
! !Biphasic
 !



 +--------------------------------------------------------------+ +-------------
------+------------------+-----------------------+ +------------------+---------
--------+-------------------------+



 !Prox PTA
! !87.2 !
!Biphasic ! !51.1
! !Biphasic
 !



 +--------------------------------------------------------------+ +-------------
------+------------------+-----------------------+ +------------------+---------
--------+-------------------------+



 !Mid PTA
 ! !91.9 !
!Biphasic ! !63.9
! !Monophasic
 !



 +--------------------------------------------------------------+ +-------------
------+------------------+-----------------------+ +------------------+---------
--------+-------------------------+



 !Dist PTA
! !92.7 !
!Biphasic ! !65.1
! !Monophasic
 !



 +--------------------------------------------------------------+ +-------------
------+------------------+-----------------------+ +------------------+---------
--------+-------------------------+



 !Prox UMESH
! !92.6 !
!Biphasic ! !57
! !Monophasic
 !



 +--------------------------------------------------------------+ +-------------
------+------------------+-----------------------+ +------------------+---------
--------+-------------------------+



 !Mid UMESH
 ! !108!
!Biphasic ! !82.5
! !Monophasic
 !



 +--------------------------------------------------------------+ +-------------
------+------------------+-----------------------+ +------------------+---------
--------+-------------------------+



 !Dist UMESH
! !86.2 !
!Biphasic ! !31
! !Monophasic
 !



 +--------------------------------------------------------------+ +-------------
------+------------------+-----------------------+ +------------------+---------
--------+-------------------------+



 !Prox Peroneal
 ! !68.6 !
!Biphasic ! !36.1
! !Biphasic
 !



 +--------------------------------------------------------------+ +-------------
------+------------------+-----------------------+ +------------------+---------
--------+-------------------------+



 !Dist Peroneal
 ! !56.3 !
!Biphasic ! !32.2
! !Biphasic
 !



 +--------------------------------------------------------------+ +-------------
------+------------------+-----------------------+ +------------------+---------
--------+-------------------------+



 









 Narrative

 

 



[facility] - Lower Extremity Arterial Duplex



 



 Demographics



 



 Patient NameDANIEL ALATORRE Date of Study2017



 JONO



 



 MRN [id number] Age
82



 



 Visit [id number] Gender 
Female



 



 Accession [id number] Date of Birth



 Number



 



 Referring [facility] Room Number



 Physician GIRISH



 



 Sonographer Adrian Bender.Interpreting BIANKA Shepard RVT, ARDMS Physician
MD, [facility]



 



Procedure



Type of Study:



 



 Extremities Arteries: Lower Extremities Arterial Duplex, ARTERIAL DOPPLER



 LEGS, BILATERAL.



 



Indications for Study:PVD.



 



Patient Status:Routine.



Study Location:Vascular Lab.



Technical Quality:Adequate visualization.



 









 Procedure Note

 

 



Interface, External Ris In - 2017  2:53 PM CDT



[facility] - Lower Extremity Arterial Duplex



 Demographics

 

 Patient Name    DANIEL ALATORRE   Date of Study    2017

                 JONO

 

 MRN             [id number]             Age              82

 

 Visit Number    [id number]           Gender           Female

 

 Accession       [id number]             Date of Birth    1934

 Number

 

 Referring       [facility] Room Number

 Physician STOUT

 

 Sonographer     Adrian Bender.    Interpreting     BIANKA Shepard RVT, ARDMS           Physician        MD, [facility]

 

Procedure

Type of Study:

 

 Extremities Arteries: Lower Extremities Arterial Duplex, ARTERIAL DOPPLER

 LEGS, BILATERAL.

 

Indications for Study:PVD.



Patient Status:Routine.

Study Location:Vascular Lab.

Technical Quality:Adequate visualization.



Impressions

Right Impression

 1. The common femoral, profunda femoral, superficial femoral, popliteal,

posterior tibial, peroneal and anterior tibial arteries are patent with

biphasic Doppler waveforms throughout.

 2. There is > 50 % stenosis of the common femoral and proximal superficial

femoral arteries with elevated velocities of 283 cm/s and 213 cm/s.

 3. The PT pressure is 158 mmHg with an DELMY of 1.01(normal range) and the DP

pressure is 137 mmHg with an DELMY of 0.86 (mild obstruction range).

 4. The great toe pressure is 71 mmHg with an abnormal TBI of 0.46.

 5. The digits have adequate flow by PPG waveforms.

Left Impression

 1. The common femoral, superficial femoral, popliteal and peroneal arteries

are patent with biphasic Doppler waveforms throughout.

 2. The posterior tibial and anterior tibial arteries have monophasic

waveforms.

 3. The PT pressure is 102 mmHg with an DELMY of 0.65 (severe obstruction

range)and the DP pressure is 88 mmHg with an DELMY of 0.56 (severe obstruction

range.

 4. The great toe pressure is 46 mmHg with an abnormal TBI of 0.29.

 5. The digits have adequate flow by PPG waveforms.



 Conclusions

 

 Summary

 

 Arterial pressures and Doppler analysis were performed bilaterally. The

 arteries were adequately visualized. On the right, the common femoral,

 profunda femoral, superficial femoral, popliteal, posterior tibial,

 peroneal and anterior tibial arteries were patent with biphasic Doppler

 waveforms throughout. There was stenosis of the common femoral and

 proximal superficial femoral artery. The DELMY's were in the normal to mild

 obstruction range. The TBI was abnormal. The digits had adequate flow by

 PPG. On the left, the common femoral, superficial femoral, popliteal and

 peroneal arteries were patent with biphasic Doppler waveforms throughout.

 The posterior tibial and anterior tibial arteries had monophasic

 waveforms. The DELMY's were in the severe obstruction range. The TBI was

 abnormal and the digits had adequate flow by PPG.

 

 Signature

 

 ----------------------------------------------------------------

 Electronically signed by BIANKA Shepard MD,

 RPVI(Interpreting physician) on 2017 02:53 PM

 ----------------------------------------------------------------

 

Velocities are measured in cm/s ; Diameters are measured in cm



LE Duplex Measurements



                                                                  Right        
                                                    Left

 

 +--------------------------------------------------------------+ +-------------
------+------------------+-----------------------+ +------------------+---------
--------+-------------------------+

 !Location                                                      ! !PSV         
       !EDV               !Waveform               ! !PSV               !EDV    
          !Waveform                 !

 +--------------------------------------------------------------+ +-------------
------+------------------+-----------------------+ +------------------+---------
--------+-------------------------+

 !Mid Common Femoral                                            ! !283         
       !                  !Biphasic               ! !128               !       
          !Biphasic                 !

 +--------------------------------------------------------------+ +-------------
------+------------------+-----------------------+ +------------------+---------
--------+-------------------------+

 !Prox PFA                                                      ! !187         
       !                  !Biphasic               ! !105               !       
          !Monophasic               !

 +--------------------------------------------------------------+ +-------------
------+------------------+-----------------------+ +------------------+---------
--------+-------------------------+

 !Prox SFA                                                      ! !213         
       !                  !Biphasic               ! !108               !       
          !Biphasic                 !

 +--------------------------------------------------------------+ +-------------
------+------------------+-----------------------+ +------------------+---------
--------+-------------------------+

 !Mid SFA                                                       ! !140         
       !                  !Biphasic               ! !124               !       
          !Biphasic                 !

 +--------------------------------------------------------------+ +-------------
------+------------------+-----------------------+ +------------------+---------
--------+-------------------------+

 !Dist SFA                                                      ! !97.4        
       !                  !Biphasic               ! !98.2              !       
          !Biphasic                 !

 +--------------------------------------------------------------+ +-------------
------+------------------+-----------------------+ +------------------+---------
--------+-------------------------+

 !Prox Popliteal                                                ! !84.1        
       !                  !Biphasic               ! !65.7              !       
          !Biphasic                 !

 +--------------------------------------------------------------+ +-------------
------+------------------+-----------------------+ +------------------+---------
--------+-------------------------+

 !Dist Popliteal                                                ! !103         
       !                  !Biphasic               ! !61                !       
          !Biphasic                 !

 +--------------------------------------------------------------+ +-------------
------+------------------+-----------------------+ +------------------+---------
--------+-------------------------+

 !Prox PTA                                                      ! !87.2        
       !                  !Biphasic               ! !51.1              !       
          !Biphasic                 !

 +--------------------------------------------------------------+ +-------------
------+------------------+-----------------------+ +------------------+---------
--------+-------------------------+

 !Mid PTA                                                       ! !91.9        
       !                  !Biphasic               ! !63.9              !       
          !Monophasic               !

 +--------------------------------------------------------------+ +-------------
------+------------------+-----------------------+ +------------------+---------
--------+-------------------------+

 !Dist PTA                                                      ! !92.7        
       !                  !Biphasic               ! !65.1              !       
          !Monophasic               !

 +--------------------------------------------------------------+ +-------------
------+------------------+-----------------------+ +------------------+---------
--------+-------------------------+

 !Prox UMESH                                                      ! !92.6        
       !                  !Biphasic               ! !57                !       
          !Monophasic               !

 +--------------------------------------------------------------+ +-------------
------+------------------+-----------------------+ +------------------+---------
--------+-------------------------+

 !Mid UMESH                                                       ! !108         
       !                  !Biphasic               ! !82.5              !       
          !Monophasic               !

 +--------------------------------------------------------------+ +-------------
------+------------------+-----------------------+ +------------------+---------
--------+-------------------------+

 !Dist UMESH                                                      ! !86.2        
       !                  !Biphasic               ! !31                !       
          !Monophasic               !

 +--------------------------------------------------------------+ +-------------
------+------------------+-----------------------+ +------------------+---------
--------+-------------------------+

 !Prox Peroneal                                                 ! !68.6        
       !                  !Biphasic               ! !36.1              !       
          !Biphasic                 !

 +--------------------------------------------------------------+ +-------------
------+------------------+-----------------------+ +------------------+---------
--------+-------------------------+

 !Dist Peroneal                                                 ! !56.3        
       !                  !Biphasic               ! !32.2              !       
          !Biphasic                 !

 +--------------------------------------------------------------+ +-------------
------+------------------+-----------------------+ +------------------+---------
--------+-------------------------+

 





after 2017

## 2018-04-21 NOTE — DIAGNOSTIC IMAGING REPORT
EXAMINATION:  CHEST SINGLE (PORTABLE)    



INDICATION:      Shortness of breath



COMPARISON:  Chest x-ray 4/9/2018

     

FINDINGS:  AP view   

Patient is rotated on this exam.



TUBES and LINES:  None.



LUNGS:  Lungs are well inflated.  Improving left basilar atelectasis. There is

perihilar interstitial opacities, consistent with interstitial edema.

Ill-defined airspace opacity in the left midlung.



PLEURA:  Likely tiny bilateral pleural effusions.



HEART AND MEDIASTINUM:  The cardiomediastinal silhouette is unremarkable. There

are atherosclerotic calcifications within the aorta.



BONES AND SOFT TISSUES:  There are degenerative changes in the thoracic spine. 

Soft tissues are unremarkable.



UPPER ABDOMEN: No free air under the diaphragm.    



IMPRESSION: 

Slight increase in interstitial edema compared to chest x-ray 4/9/2018.

Questionable airspace opacity in the left midlung which may be a component of

the edema versus superimposed infection.





Signed by: Dr. Ankur Rodney M.D. on 4/21/2018 5:40 PM

## 2018-04-21 NOTE — XMS REPORT
Continuity of Care Document

 Created on: 2018



DANIEL ARTHUR

External Reference #: [id number]

: 1934

Sex: Female



Demographics







 Address  [address] [place]  [address]

 

 Home Phone  [phone]

 

 Preferred Language  Unknown

 

 Marital Status  Unknown

 

 Mandaen Affiliation  Unknown

 

 Race  White

 

 Additional Race(s)  

 

 Ethnic Group  Unknown





Author







 Author  [facility]

 

 Organization  [facility]

 

 Address  [address]

[place]  [address]



 

 Phone  Unavailable







Support







 Name  Relationship  Address  Phone

 

 CICI CARLOS MD  Caregiver  [address]

[place]  [address]  Unavailable

 

 THANH COTTER MD  Caregiver  [address]

[place]  [address]  [phone]

 

 THANH COTTER MD  Caregiver  [address]

[place]  [address]  [phone]

 

 THANH COTTER MD  Caregiver  [address]

[place], [place]  [id number] [phone]

 

 THANH COTTER MD  Caregiver  [address]

[place]  [address]  [phone]

 

 MARK VIDAL  Next Of Kin  [address], [place]  [id number] [phone]







Care Team Providers







 Care Team Member Name  Role  Phone

 

 THANH COTTER MD  PCP  [phone]







Insurance Providers







 Guarantor  Daniel Arthur

 

 Address  [address] [place] [address]

 

 Phone  [phone]

 

 Email  PT DECLINED











 Payer  [facility]

 

 Policy Number  [id number]

 

 Subscriber's Name  Josh Alatorre

 

 Relationship  G8 Other Relationship

 

 Group Number  [id number]

 

 Group Name  RETIRED

 

 Effective Date  18











 Payer  Medicare A & B

 

 Policy Number  [id number]

 

 Subscriber's Name  Daniel Arthur

 

 Relationship  18 Self / Same As Patient

 

 Group Number  [id number]

 

 Group Name  RETIRED

 

 Effective Date  99







Advance Directives







 Directive  Response  Recorded Date/Time

 

 Does the patient have an advance directive?  Yes  18 5:26pm

 

 If yes, is advance directive on file with [facility]?  No  17 4:32am

 

 If not on file with [facility] will patient provide a copy?  Yes  17 4:32am

 

 Do you have a Directive to Physician?  No  18 9:46am

 

 Do you have a Medical Power of ?  No  18 9:46am

 

 Do you have an out of hospital Do Not Resuscitate Order?  No  18 9:46am

 

 Do you have any special needs we should be aware of?  No  18 9:46am

 

 Do you have a support person here with you today?  Yes  18 9:46am

 

 Did patient receive Notice of Privacy Practices?  Yes  18 9:46am

 

 Did patient receive patient rights and responsibilities?  Yes  18 9:46am







Problems







 Medical Problem  Onset Date  Status

 

 Arterial occlusion, lower extremity  Unknown   

 

 Cystitis  Unknown  Acute

 

 Headache  Unknown  Acute

 

 Hypertension  Unknown  Acute

 

 Urinary tract infection  Unknown  Acute







Medications





Current Home Medications





 Medication  Dose  Units  Route  Directions  Days  Qty  Instructions  Start Date

 

 Allopurinol 100 Mg Tablet  100  Mg  Oral  Daily     30 Tab      

 

 Allopurinol 300 Mg Tablet  100  Mg  Oral  Daily     30 Tab      

 

 Amlodipine Besylate 5 Mg Tablet  10  Mg  Oral  Daily     30 Tab      

 

 Aspirin (Aspir 81) 81 Mg Tablet.dr  81  Mg  Oral  Daily            

 

 Biotin 2,500 Mcg Capsule  5,000  Mg  Oral  Daily            

 

 Clonidine Hcl 0.2 Mg Tablet  0.2  Mg  Oral  Three Times A Day            

 

 Hydrocodone Bit/Acetaminophen (Norco 5-325 Tablet) 1 Each Tablet  1  Each  
Oral  As Needed            

 

 Ibesartan  150  Mg  Oral  Twice A Day            

 

 Levothyroxine Sodium 50 Mcg Tablet  50  Mcg  Oral  Daily     30 Tab      

 

 Megestrol Acetate 40 Mg Tablet  20  Mg  Oral  Daily     30 Tab      

 

 Metoprolol Succinate 50 Mg Tab.er.24h  100  Mg  Oral  Daily            

 

 Pantoprazole Sodium (Protonix) 40 Mg Tablet.dr  40  Mg  Oral  Daily            

 

 Pramipexole Di-Hcl (Mirapex) 0.25 Mg Tablet  0.5  Mg  Oral  Bedtime     30 Tab
      

 

 Warfarin Sodium (Coumadin) 1 Mg Tablet  0.5  Mg  Oral  Today At 5:00PM     7 
Tab      









Past Home Medications





 Medication  Directions  Ordered  Status

 

 Pitavastatin Calcium (Livalo) 4 Mg Tablet, 4 Mg Oral  Daily     Discontinued







Social History







 Social History Problem  Response  Recorded Date/Time  Onset Date  Status

 

 Hx Psychiatric Problems  No  2018 5:26pm  Not Applicable  Not Applicable

 

 Hx Eating Disorder  No  2018 5:26pm  Not Applicable  Not Applicable

 

 Hx Substance Use Disorder  No  2018 5:26pm  Not Applicable  Not 
Applicable

 

 Hx Depression  No  2018 5:26pm  Not Applicable  Not Applicable

 

 Hx Alcohol Use  No  2018 5:26pm  Not Applicable  Not Applicable

 

 Hx Substance Use Treatment  No  2018 5:26pm  Not Applicable  Not 
Applicable

 

 Hx Physical Abuse  No  2018 5:26pm  Not Applicable  Not Applicable











 Smoking Status  Start Date  Stop Date

 

 Former smoker      







Hospital Discharge Instructions

No hospital discharge instruction information available.



Plan of Care







 Discharge Date  18 1:04pm

 

 Disposition  HOME HEALTH SERVICE

 

 Instructions/Education Provided  Using Oxygen at Home

 

 Prescriptions  See Medication Section

 

 Additional Instructions/Education  LOW SALT DIET AND FOLLOW UP WITH PRIMARY 
CARE PROVIDER IN 2 WEEKS

SEE INSTRUCTIONS ON USE OF OXYGEN 

SCHEDULE FOLOW UP APPOINTMENT WITH DR. SUGGS IN 1 WEEK AFTER DISCHARGE FOR INR 
BLOOD CHECK







Functional Status







 Query  Response  Date Recorded

 

 FUNCTIONAL STATUS  .

  2018 11:49am

 

 Assistive Devices  Rolling Walker

  2018 5:30pm

 

 Ambulation Ability  Minimum Assistance

 1 person assist

  2018 5:30pm

 

 Toileting Ability  Minimum Assistance

  2018 6:22pm







Allergies, Adverse Reactions, Alerts







 Allergen  Type  Severity  Reaction  Status  Last Updated

 

 Sulfa (Sulfonamide Antibiotics)  Allergy  Mild     Active  17

 

 Statins-Hmg-Coa Reductase Inhibitor  Adverse Reaction  Intermediate  MUSCLE 
SPASMS/SEVERE LEG CRAMPS  Active  17

 

 Levofloxacin  Allergy  Mild  MUSCLE CRAMPS  Active  12/09/10







Immunizations

No immunization information available.



Vital Signs





Acute Vital Signs





 Vital  Response  Date/Time

 

 Temperature (Fahrenheit)  99.0 degrees F (97.6 - 99.5)  2018 11:39am

 

 Pulse      

 

    Pulse Rate (adult)  99 bpm (60 - 90)  2018 11:39am

 

 Respiratory Rate  20 bpm (12 - 24)  2018 11:39am

 

 Blood Pressure  149/67 mm Hg  2018 11:39am

 

 Height  5 ft 3 in  2018 5:26pm

 

 Weight  102.13 lb  2018 6:42am

 

 Body Mass Index  18.1 kg/m^2  2018 6:42am







Results





Laboratory Results





 Test Name  Result  Units  Flags  Reference  Collection Date/Time  Result Date/
Time  Comments

 

 Ovalocytes  FEW           2017 6:20am  2017 7:57am   

 

 Amylase Level  94  U/L       2017 7:25am  2017 9:16am   

 

 Lipase  26  U/L     8-78  2017 7:25am  2017 9:16am   

 

 Bedside Glucose  141  mg/dL   H    2017 4:09pm  2017 4:25pm  
Meter ID: [id number]



 

 Uric Acid  8.1  mg/dL   H  2.6-6.0  2017 4:15pm  2017 5:59pm   

 

 Triglycerides Level  131  MG/DL     0-149  2017 7:24am  2017 8:
26am   

 

 Cholesterol Level  273  MD/DL   H  0-199  2017 7:24am  2017 8:26am
  Less than 200 mg/dL       Low Risk



 201 - 239 mg/dL           Borderline Risk



 240 mg/dl and greater     High Risk                        



 

 LDL Cholesterol  190  MG/DL   H    2017 7:24am  2017 8:26am 
  

 

 HDL Cholesterol  57  MG/DL     40-60  2017 7:24am  2017 8:26am   

 

 Cholesterol/HDL Ratio  4.8      H  3.0-3.6  2017 7:24am  2017 8:
26am   

 

 Urine Color  YELLOW        YELLOW  2017 9:20pm  2017 10:26pm   

 

 Urine Clarity  SL CLOUDY        CLEAR  2017 9:20pm  2017 10:26pm   

 

 Urine Specific Gravity  1.020        1.010-1.025  2017 9:20pm  2017 10:26pm   

 

 Urine pH  6        5 - 7  2017 9:20pm  2017 10:26pm   

 

 Urine Leukocyte Esterase  TRACE      H  NEGATIVE  2017 9:20pm  2017 10:26pm   

 

 Urine Nitrite  NEGATIVE        NEGATIVE  2017 9:20pm  2017 10:26pm
   

 

 Urine Protein  1+      H  NEGATIVE  2017 9:20pm  2017 10:26pm   

 

 Urine Glucose (UA)  NEGATIVE        NEGATIVE  2017 9:20pm  2017 10:
26pm   

 

 Urine Ketones  TRACE      H  NEGATIVE  2017 9:20pm  2017 10:26pm   

 

 Urine Urobilinogen  0.2  mg/dL     0.2 - 1  2017 9:20pm  2017 10:
26pm   

 

 Urine Bilirubin  NEGATIVE        NEGATIVE  2017 9:20pm  2017 10:
26pm   

 

 Urine Blood  NEGATIVE        NEGATIVE  2017 9:20pm  2017 10:26pm   

 

 Urine WBC  11-20  /HPF   H  0-5  2017 9:20pm  2017 10:32pm   

 

 Urine RBC  0-5  /HPF     0-5  2017 9:20pm  2017 10:32pm   

 

 Urine Bacteria  FEW  /HPF     NONE  2017 9:20pm  2017 10:32pm   

 

 Urine Epithelial Cells  MODERATE  /LPF     NONE  2017 9:20pm  2017 
10:32pm   

 

 Urine Mucus  FEW      H  RARE  2017 9:20pm  2017 10:32pm   

 

 White Blood Count  8.33  x10e3/uL     4.8-10.8  2018 6:35am  2018 7
:11am   

 

 Red Blood Count  4.07  x10e6/uL     3.6-5.1  2018 6:35am  2018 7:
11am   

 

 Hemoglobin  8.9  g/dL   L  12.0-16.0  2018 6:35am  2018 7:11am   

 

 Hematocrit  30.1  %   L  34.2-44.1  2018 6:35am  2018 7:11am   

 

 Mean Corpuscular Volume  74.0  fL   L  81-99  2018 6:35am  2018 7:
11am   

 

 Mean Corpuscular Hemoglobin  21.9  pg   L  28-32  2018 6:35am  2018 7:11am   

 

 Mean Corpuscular Hemoglobin Concent  29.6  g/dL   L  31-35  2018 6:35am  
2018 7:11am   

 

 Red Cell Distribution Width  28.8  %   H  11.7-14.4  2018 6:35am  2018 7:11am   

 

 Platelet Count  333  x10e3/uL     140-360  2018 6:35am  2018 7:
11am   

 

 Neutrophils (%) (Auto)  75.5  %     38.7-80.0  2018 6:352018 7:
11am   

 

 Lymphocytes (%) (Auto)  11.6  %   L  18.0-39.1  2018 6:352018 7
:11am   

 

 Monocytes (%) (Auto)  9.0   %     4.4-11.3  2018 6:352018 7:
11am   

 

 Eosinophils (%) (Auto)  3.1  %     0.0-6.0  2018 6:352018 7:
11am   

 

 Basophils (%) (Auto)  0.4  %     0.0-1.0  2018 6:352018 7:11am
   

 

 IM GRANULOCYTES %  0.4  %     0.0-1.0  2018 6:352018 7:11am   

 

 Neutrophils # (Auto)  6.3        2.1-6.9  2018 6:352018 7:11am
   

 

 Lymphocytes # (Auto)  1.0        1.0-3.2  2018 6:352018 7:11am
   

 

 Monocytes # (Auto)  0.8        0.2-0.8  2018 6:352018 7:11am   

 

 Eosinophils # (Auto)  0.3        0.0-0.4  2018 6:352018 7:11am
   

 

 Basophils # (Auto)  0.0        0.0-0.1  2018 6:352018 7:11am   

 

 Absolute Immature Granulocyte (auto  0.03  x10e3/uL     0-0.1  2018 6:
352018 7:11am   

 

 Platelet Estimate  ADEQUATE           2018 6:302018 10:07am   

 

 Platelet Morphology Comment  FEW LARGE           2018 6:302018 
10:07am   

 

 Hypochromasia  SLIGHT           2018 6:302018 10:07am   

 

 Poikilocytosis  SLIGHT           2018 8:23am  2018 10:24am   

 

 Anisocytosis  MODERATE           2018 6:30am  2018 10:07am   

 

 Microcytosis  SLIGHT           2018 8:23am  2018 10:24am   

 

 Macrocytosis  MODERATE           2018 6:30am  2018 10:07am   

 

 Target Cells  FEW           2018 8:23am  2018 10:24am   

 

 Toxic Granulation  SLIGHT           2018 6:30am  2018 10:07am   

 

 Elliptocytes  SLIGHT           2018 6:30am  2018 10:07am   

 

 Red Cell Morphology Comment  ABNORMAL           2018 6:30am  2018 
10:07am   

 

 Prothrombin Time  12.8  seconds     11.9-14.5  2018 6:28am  2018 7:
55am   

 

 Prothromb Time International Ratio  1.04           2018 6:28am  2018 7:55am  Oral Anticoagulant Therapy INR Values:



 1. Low Intensity Therapy        1.5 - 2.0



 2. Moderate Intensity Therapy   2.0 - 3.0



 3. High Intensity Therapy(1)    2.5 - 3.5



 4. High Intensity Therapy(2)    3.0 - 4.0



 5. Panic Value INR              > 5.0

 

 Activated Partial Thromboplast Time  32.0  seconds     23.8-35.5  2018 8:
31am  2018 10:10am   

 

 D-Dimer Quantitative (PE/DVT)  3.04  ug/mLFEU   H  0.00-0.45  2018 8:
31am  2018 10:13am  As with all in vitro diagnostic tests, the test 
results 

should be interpreted by the physician in conjunction with 

clinical findings and other test results.







Test results are reported in NEW D-dimer units(ug/mLFEU).

 

 Sodium Level  139  mmol/L     136-145  2018 6:35am  2018 7:43am   

 

 Potassium Level  4.5  mmol/L     3.5-5.1  2018 6:35am  2018 7:43am
   

 

 Chloride Level  95  mmol/L   L    2018 6:35am  2018 7:43am   

 

 Carbon Dioxide Level  34  mmol/L   H  22-29  2018 6:352018 7:
43am   

 

 Anion Gap  14.5  mmol/L     8-16  2018 6:35am  2018 7:43am   

 

 Blood Urea Nitrogen  28  mg/dL   H  7-2018 6:35am  2018 7:43am
   

 

 Creatinine  0.95  mg/dL     0.57-1.11  2018 6:352018 7:43am   

 

 BUN/Creatinine Ratio  29      H  6-2018 6:352018 7:43am   

 

 Estimat Glomerular Filtration Rate  56  ML/MIN   L  60-  2018 6:352018 7:43am  Ranges were taken from the National Kidney Disease Education 

Program and the National Kid[employer] literature.







Reference ranges:



 60 or greater: Normal



 16-59 (for 3 consecutive months): Chronic kidney disease 



 15 or less: Kidney failure

 

 Glucose Level  82  mg/dL       2018 6:352018 7:43am   

 

 Calcium Level  9.1  mg/dL     8.4-10.2  2018 6:352018 7:43am   

 

 Magnesium Level  1.7  MG/DL     1.3-2.1  2018 6:352018 7:43am 
  

 

 Iron Level  17  ug/dL   L    2018 6:49am  2018 8:52am   

 

 Total Iron Binding Capacity  188  ug/dL   L  261-478  2018 6:49am  2018 8:52am   

 

 Percent Iron Saturation  9  %   L  15-50  2018 6:49am  2018 8:52am
   

 

 Transferrin  134  mg/dL   L  180-382  2018 6:49am  2018 8:52am   

 

 Total Bilirubin  0.2  mg/dL     0.2-1.2  2018 6:35am  2018 7:43am 
  

 

 Aspartate Amino Transf (AST/SGOT)  40  IU/L   H  5-34  2018 6:35am   7:43am   

 

 Alanine Aminotransferase (ALT/SGPT)  24  IU/L     0-55  2018 6:35am  2018 7:43am   

 

 Total Protein  5.5  g/dL   L  6.5-8.1  2018 6:35am  2018 7:43am   

 

 Albumin  2.0  g/dL   L  3.5-5.0  2018 6:35am  2018 7:43am   

 

 Globulin  3.5  g/dL     2.3-3.5  2018 6:35am  2018 7:43am   

 

 Albumin/Globulin Ratio  0.6      L  0.8-2.0  2018 6:35am  2018 7:
43am   

 

 Alkaline Phosphatase  71  IU/L       2018 6:35am  2018 7:
43am   

 

 B-Type Natriuretic Peptide  63.5  pg/mL     0-100  2018 6:35am  2018 4:53pm   

 

 Creatine Kinase  15  IU/L   L    2018 6:40am  2018 8:27am   

 

 Creatine Kinase MB  0.90  ng/mL     0-5.0  2018 6:40am  2018 8:
27am   

 

 Troponin I  0.001  ng/mL     0-0.300  2018 6:40am  2018 8:27am   

 

 Thyroid Stimulating Hormone (TSH)  6.193  uIU/mL   H  0.350-4.940  2018 8
:31am  2018 10:47am   







Procedures







 Procedure  Status  Date  Provider(s)

 

 CT of abdomen and pelvis without contrast  Active  17  CICI CARLOS MD

 

 X-ray of chest, two views  Active  17  CICI CARLOS MD

 

 X-ray of chest, two views  Active  17  COBY TY MD

 

 Computed tomography angiography of abdominal aorta and bilateral iliofemoral 
arteries with lower extremity runoff without then with contrast  Active    CICI CARLOS MD

 

 Computed tomography of brain without radiopaque contrast  Active  17  
NOEMY SUGGS MD

 

 Computed tomography of brain without radiopaque contrast  Active  17  
FABIOLA CASTILLO MD

 

 Computed tomography of chest without contrast  Active  18  CICI CARLOS MD







Encounters







 Encounter  Location  Arrival/Admit Date  Discharge/Depart Date  Attending 
Provider

 

 Discharged Inpatient  [facility]  18 11:46am   1:04pm  THANH COTTER MD

 

 Discharged Recurring  [facility]  18 7:55am  18 
11:59pm  ALVIN BRANDON MD

 

 Registered Clinic  [facility]  17 3:00pm     JALIL REAGAN

 

 Departed Emergency Room  [facility]  17 4:36pm   12:30am  FABIOLA CASTILLO MD

 

 Registered Clinic  [facility]  17 2:08pm     NOEMY SUGGS MD

 

 Discharged Inpatient  [facility]  17 2:10am  17 
7:15pm  THANH COTTER MD

 

 Registered Clinic  [facility]  17 1:20pm     THANH COTTER MD

 

 Discharged Inpatient  [facility]  17 4:13pm  17 
2:20pm  THANH COTTER MD

## 2018-04-21 NOTE — XMS REPORT
Clinical Summary

 Created on: 2018



Daniel Arthur

External Reference #: [id number]

: 1934

Sex: Female



Demographics







 Address  [address] [place]  [address]

 

 Home Phone  [phone]

 

 Preferred Language  English

 

 Marital Status  

 

 Restorationism Affiliation  Unknown

 

 Race  White

 

 Ethnic Group  Non-





Author







 Author  [facility]

 

 Organization  [facility]

 

 Address  Unknown

 

 Phone  Unavailable







Support







 Name  Relationship  Address  Phone

 

 Brooke Brown  Unknown  [phone]







Care Team Providers







 Care Team Member Name  Role  Phone

 

 Josh Cunningham MD  PCP  Unavailable







Allergies







    



  Active Allergy   Reactions   Severity   Noted Date   Comments

 

    



  Levofloxacin   Swelling    2017 

 

    



  Sulfa (Sulfonamide     2017 



  Antibiotics)    







Current Medications







      



  Prescription   Sig.   Disp.   Refills   Start   End Date   Status



      Date  

 

      



  aspirin (ECOTRIN) 81 MG   Take 81 mg by mouth.       Active



  enteric coated tablet      

 

      



  amLODIPine (NORVASC) 5 mg   Take 5 mg by mouth once    3   20    Active



  tablet   daily.     17  

 

      



  clonIDINE HCl (CATAPRES)      20    Active



  0.2 MG tablet      17  

 

      



  levothyroxine (SYNTHROID,   TAKE 1 TABLET BY MOUTH ON    3   04/15/20    
Active



  LEVOXYL) 50 mcg tablet   AN EMPTY STOMACH     17  

 

      



  metoprolol succinate XL   Take 100 mg by mouth once    3   20    Active



  (TOPROL-XL) 100 mg 24 hr   daily.     17  



  tablet      

 

      



  pantoprazole (PROTONIX)   Take 40 mg by mouth once    3   20    Active



  40 MG EC tablet   daily.     17  

 

      



  HYDROcodone-acetaminophen   Take 1 tablet by mouth       Active



  (NORCO) 5-325 mg per   every 6 (six) hours as     



  tablet   needed for moderate pain.     







Active Problems







 



  Problem   Noted Date

 

 



  GERD (gastroesophageal reflux disease) 

 

 



  Substernal chest pain 







Encounters







    



  Date   Type   Specialty   Care Team   Description

 

    



  2017   Documentation   Gastroenterology   Chester Dodge MD 

 

    



  2017   [facility]   Radiology   Chester Dodge   Substernal 
chest pain



   Encounter    MD 

 

    



  2017   [facility]   Radiology   Chester Dodge   Substernal 
chest pain



   Encounter    MD 

 

    



  2017   Office Visit   Gastroenterology   Chester Dodge   
Substernal chest pain



     MD   (Primary Dx);



      Gastroesophageal reflux



      disease, esophagitis



      presence not specified



after 2017



Family History







   



  Relation   Name   Status   Comments

 

   



  Father     

 

   



  Mother     







Social History







    



  Tobacco Use   Types   Packs/Day   Years Used   Date

 

    



  Former Smoker    









   



  Alcohol Use   Drinks/Week   oz/Week   Comments

 

   



  No   









 



  Sex Assigned at Birth   Date Recorded

 

 



  Not on file 







Last Filed Vital Signs







  



  Vital Sign   Reading   Time Taken

 

  



  Blood Pressure   212/77   2017  2:08 PM CDT

 

  



  Pulse   90   2017  2:08 PM CDT

 

  



  Temperature   36.4   C (97.5   F)   2017  2:08 PM CDT

 

  



  Respiratory Rate   -   -

 

  



  Oxygen Saturation   -   -

 

  



  Inhaled Oxygen   -   -



  Concentration  

 

  



  Weight   51.3 kg (113 lb)   2017  2:08 PM CDT

 

  



  Height   -   -

 

  



  Body Mass Index   -   -







Plan of Treatment







   



  Health Maintenance   Due Date   Last Done   Comments

 

   



  ZOSTER VACCINE   1994  

 

   



  PNEUMOCOCCAL   1999  



  POLYSACCHARIDE VACCINE   



  AGE 65 AND OVER   

 

   



  PNEUMOCOCCAL-13   1999  

 

   



  INFLUENZA VACCINE   2018  







Results

* US Abdomen Complete (2017 12:18 PM)





 



  Specimen   Performing Laboratory

 

 



    [facility]



   [address] [place] [address]









 Narrative

 

 



EXAM: US ABDOMEN COMPLETE



 



CLINICAL DATA:R07.2 Precordial pain, ABDOMINAL PAIN



 



COMPARISON: Yet



 



IMPRESSION:



 



LIVER:The liver demonstrates normal echogenicity without focal mass or 
intrahepatic biliary ductal dilatation.



 



GALLBLADDER:The gallbladder is without evidence of calculi. The 
gallbladder wall is not thickened and there is no pericholecystic fluid.



 



CBD: 2.3 mm, within normal limits.



 



MPV:Demonstrated normal hepatopedal flow.0.98 cm diameter 



 



 



PANCREAS:The partially visualized portions of the pancreas are within 
normal limits.



 



SPLEEN:.Spleen is not enlarged.7.0 cm.



 



 



 



RIGHT KIDNEY: Right kidney demonstrated no mass or obstruction The right kidney 
measures 9.2cm. There are 2 renal cysts.



 



LEFT KIDNEY: Left kidney demonstrated no mass or obstruction The left kidney 
measures 7.7cm. There are 3 renal cysts with the largest measuring 5.7 cm..



 



 



 



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 
maximum diameter of 2.0 cm..



 



IVC:The visualized portions of the inferior vena cava are unremarkable.



 



ASCITES: No abnormal abdominal fluid collections are visualized. There is no 
evidence of ascites.



 



PLEURAL EFFUSION:There are no pleural effusions.



 



 



 



 



 



[facility]-[id number]



 









 Procedure Note

 

 



 Interface, Radiology Results Incoming - 2017  3:25 PM CDT



EXAM: US ABDOMEN COMPLETE



CLINICAL DATA:  R07.2 Precordial pain, ABDOMINAL PAIN



COMPARISON: Yet



IMPRESSION:



LIVER:  The liver demonstrates normal echogenicity without focal mass or 
intrahepatic biliary ductal dilatation.



  GALLBLADDER:  The gallbladder is without evidence of calculi. The gallbladder 
wall is not thickened and there is no pericholecystic fluid.



  CBD: 2.3 mm, within normal limits.



  MPV:  Demonstrated normal hepatopedal flow.  0.98 cm diameter 





PANCREAS:  The partially visualized portions of the pancreas are within normal 
limits.



SPLEEN:.  Spleen is not enlarged.  7.0 cm.







RIGHT KIDNEY: Right kidney demonstrated no mass or obstruction The right kidney 
measures 9.2cm. There are 2 renal cysts.



LEFT KIDNEY: Left kidney demonstrated no mass or obstruction The left kidney 
measures 7.7cm. There are 3 renal cysts with the largest measuring 5.7 cm..







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 
maximum diameter of 2.0 cm..

 

IVC:  The visualized portions of the inferior vena cava are unremarkable.



ASCITES: No abnormal abdominal fluid collections are visualized. There is no 
evidence of ascites.



PLEURAL EFFUSION:  There are no pleural effusions.





 





[facility]-[id number]







* FL Esophagram Complete (2017  4:00 PM)





 



  Specimen   Performing Laboratory

 

 



   [facility]



   [address] [place] [id number]









 Narrative

 

 



EXAMINATION:FL ESOPHAGRAM COMPLETE



 



CLINICAL HISTORY:R07.2 Precordial pain, Chest pain



 



COMPARISON:None.



 



Fluoroscopy time: 1.3 minute.



 



FINDINGS: 



The patient swallowed air producing granules and barium without difficulty. The 
esophagus demonstrates frequent tertiary contractions in the distal esophagus. 
No stricture or suspicious filling defect is seen. 



 



There is a moderate sliding hiatal hernia. 



 



 



IMPRESSION:



 



Moderate hiatal hernia.



 



Prominent tertiary contractions.



 



[facility]-[id number]



 









 Procedure Note

 

 



 Interface, Radiology Results Incoming - 2017  4:29 PM CDT



EXAMINATION:  FL ESOPHAGRAM COMPLETE



CLINICAL HISTORY:  R07.2 Precordial pain, Chest pain



COMPARISON:  None.



Fluoroscopy time: 1.3 minute.



FINDINGS: 

The patient swallowed air producing granules and barium without difficulty. The 
esophagus demonstrates frequent tertiary contractions in the distal esophagus. 
No stricture or suspicious filling defect is seen. 



There is a moderate sliding hiatal hernia. 





IMPRESSION:



Moderate hiatal hernia.



Prominent tertiary contractions.



[facility]-[id number]







after 2017



Insurance







     



  Payer   Benefit   Subscriber ID   Type   Phone   Address



   Plan /    



   Group    

 

     



  MEDICARE   MEDICARE   xxxxxxxxxxx   Medicare    [place], [place]



   PART A AND    



   B    

 

     



  GEHA   GEHA MED   xxxxxxxxxxxx   Commercial  



   SUPPLEMENT    









     



  Guarantor Name   Account   Relation to   Date of   Phone   Billing Address



   Type   Patient   Birth  

 

     



  DANIEL ARTHUR   Personal/F   Self   1934   Home:   [address]     [phone]   [place] [id number]

## 2018-04-21 NOTE — XMS REPORT
Clinical Summary

 Created on: 2018



Daniel Arthur

External Reference #: [id number]

: 1934

Sex: Female



Demographics







 Address  [address] [place]  [address]

 

 Home Phone  [phone]

 

 Preferred Language  English

 

 Marital Status  

 

 Baptism Affiliation  Unknown

 

 Race  White

 

 Ethnic Group  Non-





Author







 Author  [facility]

 

 Organization  [facility]

 

 Address  Unknown

 

 Phone  Unavailable







Support







 Name  Relationship  Address  Phone

 

 Brooke Brown  Unknown  [phone]







Care Team Providers







 Care Team Member Name  Role  Phone

 

 Josh Cunningham MD  PCP  Unavailable







Allergies







    



  Active Allergy   Reactions   Severity   Noted Date   Comments

 

    



  Levofloxacin   Swelling    2017 

 

    



  Sulfa (Sulfonamide     2017 



  Antibiotics)    







Current Medications







      



  Prescription   Sig.   Disp.   Refills   Start   End Date   Status



      Date  

 

      



  aspirin (ECOTRIN) 81 MG   Take 81 mg by mouth.       Active



  enteric coated tablet      

 

      



  amLODIPine (NORVASC) 5 mg   Take 5 mg by mouth once    3   20    Active



  tablet   daily.     17  

 

      



  clonIDINE HCl (CATAPRES)      20    Active



  0.2 MG tablet      17  

 

      



  levothyroxine (SYNTHROID,   TAKE 1 TABLET BY MOUTH ON    3   04/15/20    
Active



  LEVOXYL) 50 mcg tablet   AN EMPTY STOMACH     17  

 

      



  metoprolol succinate XL   Take 100 mg by mouth once    3   20    Active



  (TOPROL-XL) 100 mg 24 hr   daily.     17  



  tablet      

 

      



  pantoprazole (PROTONIX)   Take 40 mg by mouth once    3   20    Active



  40 MG EC tablet   daily.     17  

 

      



  HYDROcodone-acetaminophen   Take 1 tablet by mouth       Active



  (NORCO) 5-325 mg per   every 6 (six) hours as     



  tablet   needed for moderate pain.     







Active Problems







 



  Problem   Noted Date

 

 



  GERD (gastroesophageal reflux disease) 

 

 



  Substernal chest pain 







Encounters







    



  Date   Type   Specialty   Care Team   Description

 

    



  2017   Documentation   Gastroenterology   Chester Dodge MD 

 

    



  2017   [facility]   Radiology   Chester Dodge   Substernal 
chest pain



   Encounter    MD 

 

    



  2017   [facility]   Radiology   Chester Dodge   Substernal 
chest pain



   Encounter    MD 

 

    



  2017   Office Visit   Gastroenterology   Chester Dodge   
Substernal chest pain



     MD   (Primary Dx);



      Gastroesophageal reflux



      disease, esophagitis



      presence not specified



after 2017



Family History







   



  Relation   Name   Status   Comments

 

   



  Father     

 

   



  Mother     







Social History







    



  Tobacco Use   Types   Packs/Day   Years Used   Date

 

    



  Former Smoker    









   



  Alcohol Use   Drinks/Week   oz/Week   Comments

 

   



  No   









 



  Sex Assigned at Birth   Date Recorded

 

 



  Not on file 







Last Filed Vital Signs







  



  Vital Sign   Reading   Time Taken

 

  



  Blood Pressure   212/77   2017  2:08 PM CDT

 

  



  Pulse   90   2017  2:08 PM CDT

 

  



  Temperature   36.4   C (97.5   F)   2017  2:08 PM CDT

 

  



  Respiratory Rate   -   -

 

  



  Oxygen Saturation   -   -

 

  



  Inhaled Oxygen   -   -



  Concentration  

 

  



  Weight   51.3 kg (113 lb)   2017  2:08 PM CDT

 

  



  Height   -   -

 

  



  Body Mass Index   -   -







Plan of Treatment







   



  Health Maintenance   Due Date   Last Done   Comments

 

   



  ZOSTER VACCINE   1994  

 

   



  PNEUMOCOCCAL   1999  



  POLYSACCHARIDE VACCINE   



  AGE 65 AND OVER   

 

   



  PNEUMOCOCCAL-13   1999  

 

   



  INFLUENZA VACCINE   2018  







Results

* US Abdomen Complete (2017 12:18 PM)





 



  Specimen   Performing Laboratory

 

 



    [facility]



   [address] [place] [address]









 Narrative

 

 



EXAM: US ABDOMEN COMPLETE



 



CLINICAL DATA:R07.2 Precordial pain, ABDOMINAL PAIN



 



COMPARISON: Yet



 



IMPRESSION:



 



LIVER:The liver demonstrates normal echogenicity without focal mass or 
intrahepatic biliary ductal dilatation.



 



GALLBLADDER:The gallbladder is without evidence of calculi. The 
gallbladder wall is not thickened and there is no pericholecystic fluid.



 



CBD: 2.3 mm, within normal limits.



 



MPV:Demonstrated normal hepatopedal flow.0.98 cm diameter 



 



 



PANCREAS:The partially visualized portions of the pancreas are within 
normal limits.



 



SPLEEN:.Spleen is not enlarged.7.0 cm.



 



 



 



RIGHT KIDNEY: Right kidney demonstrated no mass or obstruction The right kidney 
measures 9.2cm. There are 2 renal cysts.



 



LEFT KIDNEY: Left kidney demonstrated no mass or obstruction The left kidney 
measures 7.7cm. There are 3 renal cysts with the largest measuring 5.7 cm..



 



 



 



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 
maximum diameter of 2.0 cm..



 



IVC:The visualized portions of the inferior vena cava are unremarkable.



 



ASCITES: No abnormal abdominal fluid collections are visualized. There is no 
evidence of ascites.



 



PLEURAL EFFUSION:There are no pleural effusions.



 



 



 



 



 



[facility]-[id number]



 









 Procedure Note

 

 



 Interface, Radiology Results Incoming - 2017  3:25 PM CDT



EXAM: US ABDOMEN COMPLETE



CLINICAL DATA:  R07.2 Precordial pain, ABDOMINAL PAIN



COMPARISON: Yet



IMPRESSION:



LIVER:  The liver demonstrates normal echogenicity without focal mass or 
intrahepatic biliary ductal dilatation.



  GALLBLADDER:  The gallbladder is without evidence of calculi. The gallbladder 
wall is not thickened and there is no pericholecystic fluid.



  CBD: 2.3 mm, within normal limits.



  MPV:  Demonstrated normal hepatopedal flow.  0.98 cm diameter 





PANCREAS:  The partially visualized portions of the pancreas are within normal 
limits.



SPLEEN:.  Spleen is not enlarged.  7.0 cm.







RIGHT KIDNEY: Right kidney demonstrated no mass or obstruction The right kidney 
measures 9.2cm. There are 2 renal cysts.



LEFT KIDNEY: Left kidney demonstrated no mass or obstruction The left kidney 
measures 7.7cm. There are 3 renal cysts with the largest measuring 5.7 cm..







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 
maximum diameter of 2.0 cm..

 

IVC:  The visualized portions of the inferior vena cava are unremarkable.



ASCITES: No abnormal abdominal fluid collections are visualized. There is no 
evidence of ascites.



PLEURAL EFFUSION:  There are no pleural effusions.





 





[facility]-[id number]







* FL Esophagram Complete (2017  4:00 PM)





 



  Specimen   Performing Laboratory

 

 



   [facility]



   [address] [place] [address]









 Narrative

 

 



EXAMINATION:FL ESOPHAGRAM COMPLETE



 



CLINICAL HISTORY:R07.2 Precordial pain, Chest pain



 



COMPARISON:None.



 



Fluoroscopy time: 1.3 minute.



 



FINDINGS: 



The patient swallowed air producing granules and barium without difficulty. The 
esophagus demonstrates frequent tertiary contractions in the distal esophagus. 
No stricture or suspicious filling defect is seen. 



 



There is a moderate sliding hiatal hernia. 



 



 



IMPRESSION:



 



Moderate hiatal hernia.



 



Prominent tertiary contractions.



 



[facility]-[id number]



 









 Procedure Note

 

 



 Interface, Radiology Results Incoming - 2017  4:29 PM CDT



EXAMINATION:  FL ESOPHAGRAM COMPLETE



CLINICAL HISTORY:  R07.2 Precordial pain, Chest pain



COMPARISON:  None.



Fluoroscopy time: 1.3 minute.



FINDINGS: 

The patient swallowed air producing granules and barium without difficulty. The 
esophagus demonstrates frequent tertiary contractions in the distal esophagus. 
No stricture or suspicious filling defect is seen. 



There is a moderate sliding hiatal hernia. 





IMPRESSION:



Moderate hiatal hernia.



Prominent tertiary contractions.



[facility]-[id number]







after 2017



Insurance







     



  Payer   Benefit   Subscriber ID   Type   Phone   Address



   Plan /    



   Group    

 

     



  MEDICARE   MEDICARE   xxxxxxxxxxx   Medicare    [place], [place]



   PART A AND    



   B    

 

     



  GEHA   GEHA MED   xxxxxxxxxxxx   Commercial  



   SUPPLEMENT    









     



  Guarantor Name   Account   Relation to   Date of   Phone   Billing Address



   Type   Patient   Birth  

 

     



  DANIEL ARTHUR   Personal/F   Self   1934   Home:   [address]     [phone]   [place] [phone]

## 2018-04-21 NOTE — XMS REPORT
Clinical Summary

 Created on: 2018



Daniel Alatorre

External Reference #: [id number]

: 1934

Sex: Female



Demographics







 Address  [address] [place]  [address]

 

 Home Phone  [phone]

 

 Preferred Language  English

 

 Marital Status  Unknown

 

 [employer]ism Affiliation  Caodaism

 

 Race  White

 

 Ethnic Group  Non-





Author







 Author  GEMA Nujira

 

 [facility]

 

 Address  Unknown

 

 Phone  Unavailable







Support







 Name  Relationship  Address  Phone

 

 , Silvana Brown  Unknown  [phone]







Care Team Providers







 Care Team Member Name  Role  Phone

 

  PCP  Unavailable







Allergies







    



  Active Allergy   Reactions   Severity   Noted Date   Comments

 

    



  Sulfa (Sulfonamide   Anaphylaxis   High   2015 



  Antibiotics)    







Current Medications







      



  Prescription   Sig.   Disp.   Refills   Start   End Date   Status



      Date  

 

      



  metoprolol (TOPROL-XL)   Take 100 mg by mouth       Active



  100 MG 24 hr tablet   daily.     

 

      



  pantoprazole (PROTONIX)   Take 40 mg by mouth       Active



  40 MG tablet   daily.     

 

      



  aspirin 81 MG EC tablet   Take 81 mg by mouth       Active



   daily.     

 

      



  temazepam (RESTORIL) 30   Take 30 mg by mouth every       Active



  mg capsule   night as needed for     



   Sleep.     

 

      



  magnesium oxide 400 mg   Take by mouth. 1-3 x       Active



  Cap   daily     







Active Problems







 



  Problem   Noted Date

 

 



  Hyponatremia   2015







Encounters







    



  Date   Type   Specialty   Care Team   Description

 

    



  2017   Hospital   Cardiology   Jake Chery,   Peripheral 
vascular



   Encounter    MD   disease, unspecified



      (HCC)

 

    



  2017   Outside Orders   Central Scheduling   Jake Chery 
  Peripheral vascular



     MD   disease, unspecified



      (HCC) (Primary Dx)



after 2017



Social History







    



  Tobacco Use   Types   Packs/Day   Years Used   Date

 

    



  Former Smoker    









   



  Alcohol Use   Drinks/Week   oz/Week   Comments

 

   



  Yes   









 



  Sex Assigned at Birth   Date Recorded

 

 



  Not on file 







Last Filed Vital Signs

Not on file



Plan of Treatment





Not on file



Results

* PERIPHERAL VASCULAR REPORT - SCAN (2017  3:23 PM)

* Arterial Doppler Legs Bilateral (2017 10:08 AM)





  



  Component   Value   Ref Range

 

  



  Ejection Fraction  









 



  Specimen   Performing Laboratory

 

 



   [facility] ECHO HEARTLAB MKCKESSON CPACS









 Impressions

 

 



Right Impression



1. The common femoral, profunda femoral, superficial femoral, popliteal,



posterior tibial, peroneal and anterior tibial arteries are patent with



biphasic Doppler waveforms throughout.



2. There is > 50 % stenosis of the common femoral and proximal superficial



femoral arteries with elevated velocities of 283 cm/s and 213 cm/s.



3. The PT pressure is 158 mmHg with an DELMY of 1.01(normal range) and the DP



pressure is 137 mmHg with an DELMY of 0.86 (mild obstruction range).



4. The great toe pressure is 71 mmHg with an abnormal TBI of 0.46.



5. The digits have adequate flow by PPG waveforms.



Left Impression



1. The common femoral, superficial femoral, popliteal and peroneal arteries



are patent with biphasic Doppler waveforms throughout.



2. The posterior tibial and anterior tibial arteries have monophasic



waveforms.



3. The PT pressure is 102 mmHg with an DELMY of 0.65 (severe obstruction



range)and the DP pressure is 88 mmHg with an DELMY of 0.56 (severe obstruction



range.



4. The great toe pressure is 46 mmHg with an abnormal TBI of 0.29.



5. The digits have adequate flow by PPG waveforms.



 



 Conclusions



 



 Summary



 



 Arterial pressures and Doppler analysis were performed bilaterally. The



 arteries were adequately visualized. On the right, the common femoral,



 profunda femoral, superficial femoral, popliteal, posterior tibial,



 peroneal and anterior tibial arteries were patent with biphasic Doppler



 waveforms throughout. There was stenosis of the common femoral and



 proximal superficial femoral artery. The DELMY's were in the normal to mild



 obstruction range. The TBI was abnormal. The digits had adequate flow by



 PPG. On the left, the common femoral, superficial femoral, popliteal and



 peroneal arteries were patent with biphasic Doppler waveforms throughout.



 The posterior tibial and anterior tibial arteries had monophasic



 waveforms. The DELMY's were in the severe obstruction range. The TBI was



 abnormal and the digits had adequate flow by PPG.



 



 Signature



 



 ----------------------------------------------------------------



 Electronically signed by BIANKA Shepard MD,



 JOSIAS(Interpreting physician) on 2017 02:53 PM



 ----------------------------------------------------------------



 



Velocities are measured in cm/s ; Diameters are measured in cm



 



LE Duplex Measurements



 




Right

Left



 



 +--------------------------------------------------------------+ +-------------
------+------------------+-----------------------+ +------------------+---------
--------+-------------------------+



 !Location
! !PSV!EDV
 !Waveform ! !PSV
 !EDV!Waveform
 !



 +--------------------------------------------------------------+ +-------------
------+------------------+-----------------------+ +------------------+---------
--------+-------------------------+



 !Mid Common Femoral
! !283!
!Biphasic ! !128
 ! !Biphasic
 !



 +--------------------------------------------------------------+ +-------------
------+------------------+-----------------------+ +------------------+---------
--------+-------------------------+



 !Prox PFA
! !187!
!Biphasic ! !105
 ! !Monophasic
 !



 +--------------------------------------------------------------+ +-------------
------+------------------+-----------------------+ +------------------+---------
--------+-------------------------+



 !Prox SFA
! !213!
!Biphasic ! !108
 ! !Biphasic
 !



 +--------------------------------------------------------------+ +-------------
------+------------------+-----------------------+ +------------------+---------
--------+-------------------------+



 !Mid SFA
 ! !140!
!Biphasic ! !124
 ! !Biphasic
 !



 +--------------------------------------------------------------+ +-------------
------+------------------+-----------------------+ +------------------+---------
--------+-------------------------+



 !Dist SFA
! !97.4 !
!Biphasic ! !98.2
! !Biphasic
 !



 +--------------------------------------------------------------+ +-------------
------+------------------+-----------------------+ +------------------+---------
--------+-------------------------+



 !Prox Popliteal
! !84.1 !
!Biphasic ! !65.7
! !Biphasic
 !



 +--------------------------------------------------------------+ +-------------
------+------------------+-----------------------+ +------------------+---------
--------+-------------------------+



 !Dist Popliteal
! !103!
!Biphasic ! !61
! !Biphasic
 !



 +--------------------------------------------------------------+ +-------------
------+------------------+-----------------------+ +------------------+---------
--------+-------------------------+



 !Prox PTA
! !87.2 !
!Biphasic ! !51.1
! !Biphasic
 !



 +--------------------------------------------------------------+ +-------------
------+------------------+-----------------------+ +------------------+---------
--------+-------------------------+



 !Mid PTA
 ! !91.9 !
!Biphasic ! !63.9
! !Monophasic
 !



 +--------------------------------------------------------------+ +-------------
------+------------------+-----------------------+ +------------------+---------
--------+-------------------------+



 !Dist PTA
! !92.7 !
!Biphasic ! !65.1
! !Monophasic
 !



 +--------------------------------------------------------------+ +-------------
------+------------------+-----------------------+ +------------------+---------
--------+-------------------------+



 !Prox UMESH
! !92.6 !
!Biphasic ! !57
! !Monophasic
 !



 +--------------------------------------------------------------+ +-------------
------+------------------+-----------------------+ +------------------+---------
--------+-------------------------+



 !Mid UMESH
 ! !108!
!Biphasic ! !82.5
! !Monophasic
 !



 +--------------------------------------------------------------+ +-------------
------+------------------+-----------------------+ +------------------+---------
--------+-------------------------+



 !Dist UMESH
! !86.2 !
!Biphasic ! !31
! !Monophasic
 !



 +--------------------------------------------------------------+ +-------------
------+------------------+-----------------------+ +------------------+---------
--------+-------------------------+



 !Prox Peroneal
 ! !68.6 !
!Biphasic ! !36.1
! !Biphasic
 !



 +--------------------------------------------------------------+ +-------------
------+------------------+-----------------------+ +------------------+---------
--------+-------------------------+



 !Dist Peroneal
 ! !56.3 !
!Biphasic ! !32.2
! !Biphasic
 !



 +--------------------------------------------------------------+ +-------------
------+------------------+-----------------------+ +------------------+---------
--------+-------------------------+



 









 Narrative

 

 



PV LAB - Lower Extremity Arterial Duplex



 



 Demographics



 



 Patient NameDANIEL ALATORRE Date of Study2017



 JONO



 



 MRN [id number] Age
82



 



 Visit [id number] Gender 
Female



 



 Accession [id number] Date of Birth



 Number



 



 Referring [facility] Room Number



 Physician GIRISH



 



 Sonographer Adrian Bender.Interpreting BIANKA Shepard RVT, ARDMS Physician
MD, [facility]



 



Procedure



Type of Study:



 



 Extremities Arteries: Lower Extremities Arterial Duplex, ARTERIAL DOPPLER



 LEGS, BILATERAL.



 



Indications for Study:PVD.



 



Patient Status:Routine.



Study Location:Vascular Lab.



Technical Quality:Adequate visualization.



 









 Procedure Note

 

 



Interface, External Ris In - 2017  2:53 PM CDT



PV LAB - Lower Extremity Arterial Duplex



 Demographics

 

 Patient Name    DANIEL ALATORRE   Date of Study    2017

                 JONO

 

 MRN             [id number]             Age              82

 

 Visit Number    [id number]           Gender           Female

 

 Accession       [id number]             Date of Birth    1934

 Number

 

 Referring       [facility] Room Number

 Physician STOUT

 

 Sonographer     Adrian Bender.    Interpreting     BIANKA Shepard RVT, ARDMS           Physician        MD, [facility]

 

Procedure

Type of Study:

 

 Extremities Arteries: Lower Extremities Arterial Duplex, ARTERIAL DOPPLER

 LEGS, BILATERAL.

 

Indications for Study:PVD.



Patient Status:Routine.

Study Location:Vascular Lab.

Technical Quality:Adequate visualization.



Impressions

Right Impression

 1. The common femoral, profunda femoral, superficial femoral, popliteal,

posterior tibial, peroneal and anterior tibial arteries are patent with

biphasic Doppler waveforms throughout.

 2. There is > 50 % stenosis of the common femoral and proximal superficial

femoral arteries with elevated velocities of 283 cm/s and 213 cm/s.

 3. The PT pressure is 158 mmHg with an DELMY of 1.01(normal range) and the DP

pressure is 137 mmHg with an DELMY of 0.86 (mild obstruction range).

 4. The great toe pressure is 71 mmHg with an abnormal TBI of 0.46.

 5. The digits have adequate flow by PPG waveforms.

Left Impression

 1. The common femoral, superficial femoral, popliteal and peroneal arteries

are patent with biphasic Doppler waveforms throughout.

 2. The posterior tibial and anterior tibial arteries have monophasic

waveforms.

 3. The PT pressure is 102 mmHg with an DELMY of 0.65 (severe obstruction

range)and the DP pressure is 88 mmHg with an DELMY of 0.56 (severe obstruction

range.

 4. The great toe pressure is 46 mmHg with an abnormal TBI of 0.29.

 5. The digits have adequate flow by PPG waveforms.



 Conclusions

 

 Summary

 

 Arterial pressures and Doppler analysis were performed bilaterally. The

 arteries were adequately visualized. On the right, the common femoral,

 profunda femoral, superficial femoral, popliteal, posterior tibial,

 peroneal and anterior tibial arteries were patent with biphasic Doppler

 waveforms throughout. There was stenosis of the common femoral and

 proximal superficial femoral artery. The DELMY's were in the normal to mild

 obstruction range. The TBI was abnormal. The digits had adequate flow by

 PPG. On the left, the common femoral, superficial femoral, popliteal and

 peroneal arteries were patent with biphasic Doppler waveforms throughout.

 The posterior tibial and anterior tibial arteries had monophasic

 waveforms. The DELMY's were in the severe obstruction range. The TBI was

 abnormal and the digits had adequate flow by PPG.

 

 Signature

 

 ----------------------------------------------------------------

 Electronically signed by BIANKA Shepard MD,

 RPVI(Interpreting physician) on 2017 02:53 PM

 ----------------------------------------------------------------

 

Velocities are measured in cm/s ; Diameters are measured in cm



LE Duplex Measurements



                                                                  Right        
                                                    Left

 

 +--------------------------------------------------------------+ +-------------
------+------------------+-----------------------+ +------------------+---------
--------+-------------------------+

 !Location                                                      ! !PSV         
       !EDV               !Waveform               ! !PSV               !EDV    
          !Waveform                 !

 +--------------------------------------------------------------+ +-------------
------+------------------+-----------------------+ +------------------+---------
--------+-------------------------+

 !Mid Common Femoral                                            ! !283         
       !                  !Biphasic               ! !128               !       
          !Biphasic                 !

 +--------------------------------------------------------------+ +-------------
------+------------------+-----------------------+ +------------------+---------
--------+-------------------------+

 !Prox PFA                                                      ! !187         
       !                  !Biphasic               ! !105               !       
          !Monophasic               !

 +--------------------------------------------------------------+ +-------------
------+------------------+-----------------------+ +------------------+---------
--------+-------------------------+

 !Prox SFA                                                      ! !213         
       !                  !Biphasic               ! !108               !       
          !Biphasic                 !

 +--------------------------------------------------------------+ +-------------
------+------------------+-----------------------+ +------------------+---------
--------+-------------------------+

 !Mid SFA                                                       ! !140         
       !                  !Biphasic               ! !124               !       
          !Biphasic                 !

 +--------------------------------------------------------------+ +-------------
------+------------------+-----------------------+ +------------------+---------
--------+-------------------------+

 !Dist SFA                                                      ! !97.4        
       !                  !Biphasic               ! !98.2              !       
          !Biphasic                 !

 +--------------------------------------------------------------+ +-------------
------+------------------+-----------------------+ +------------------+---------
--------+-------------------------+

 !Prox Popliteal                                                ! !84.1        
       !                  !Biphasic               ! !65.7              !       
          !Biphasic                 !

 +--------------------------------------------------------------+ +-------------
------+------------------+-----------------------+ +------------------+---------
--------+-------------------------+

 !Dist Popliteal                                                ! !103         
       !                  !Biphasic               ! !61                !       
          !Biphasic                 !

 +--------------------------------------------------------------+ +-------------
------+------------------+-----------------------+ +------------------+---------
--------+-------------------------+

 !Prox PTA                                                      ! !87.2        
       !                  !Biphasic               ! !51.1              !       
          !Biphasic                 !

 +--------------------------------------------------------------+ +-------------
------+------------------+-----------------------+ +------------------+---------
--------+-------------------------+

 !Mid PTA                                                       ! !91.9        
       !                  !Biphasic               ! !63.9              !       
          !Monophasic               !

 +--------------------------------------------------------------+ +-------------
------+------------------+-----------------------+ +------------------+---------
--------+-------------------------+

 !Dist PTA                                                      ! !92.7        
       !                  !Biphasic               ! !65.1              !       
          !Monophasic               !

 +--------------------------------------------------------------+ +-------------
------+------------------+-----------------------+ +------------------+---------
--------+-------------------------+

 !Prox UMESH                                                      ! !92.6        
       !                  !Biphasic               ! !57                !       
          !Monophasic               !

 +--------------------------------------------------------------+ +-------------
------+------------------+-----------------------+ +------------------+---------
--------+-------------------------+

 !Mid UMESH                                                       ! !108         
       !                  !Biphasic               ! !82.5              !       
          !Monophasic               !

 +--------------------------------------------------------------+ +-------------
------+------------------+-----------------------+ +------------------+---------
--------+-------------------------+

 !Dist UMESH                                                      ! !86.2        
       !                  !Biphasic               ! !31                !       
          !Monophasic               !

 +--------------------------------------------------------------+ +-------------
------+------------------+-----------------------+ +------------------+---------
--------+-------------------------+

 !Prox Peroneal                                                 ! !68.6        
       !                  !Biphasic               ! !36.1              !       
          !Biphasic                 !

 +--------------------------------------------------------------+ +-------------
------+------------------+-----------------------+ +------------------+---------
--------+-------------------------+

 !Dist Peroneal                                                 ! !56.3        
       !                  !Biphasic               ! !32.2              !       
          !Biphasic                 !

 +--------------------------------------------------------------+ +-------------
------+------------------+-----------------------+ +------------------+---------
--------+-------------------------+

 





after 2017

## 2018-04-22 VITALS — DIASTOLIC BLOOD PRESSURE: 63 MMHG | SYSTOLIC BLOOD PRESSURE: 135 MMHG

## 2018-04-22 VITALS — DIASTOLIC BLOOD PRESSURE: 50 MMHG | SYSTOLIC BLOOD PRESSURE: 137 MMHG

## 2018-04-22 VITALS — SYSTOLIC BLOOD PRESSURE: 135 MMHG | DIASTOLIC BLOOD PRESSURE: 63 MMHG

## 2018-04-22 VITALS — DIASTOLIC BLOOD PRESSURE: 55 MMHG | SYSTOLIC BLOOD PRESSURE: 111 MMHG

## 2018-04-22 VITALS — SYSTOLIC BLOOD PRESSURE: 112 MMHG | DIASTOLIC BLOOD PRESSURE: 41 MMHG

## 2018-04-22 VITALS — SYSTOLIC BLOOD PRESSURE: 129 MMHG | DIASTOLIC BLOOD PRESSURE: 51 MMHG

## 2018-04-22 LAB
ANION GAP SERPL CALC-SCNC: 12.2 MMOL/L (ref 8–16)
ANISOCYTOSIS BLD QL SMEAR: SLIGHT
BASOPHILS # BLD AUTO: 0 10*3/UL (ref 0–0.1)
BASOPHILS NFR BLD AUTO: 0.3 % (ref 0–1)
BUN SERPL-MCNC: 44 MG/DL (ref 7–26)
BUN/CREAT SERPL: 35 (ref 6–25)
CALCIUM SERPL-MCNC: 9.5 MG/DL (ref 8.4–10.2)
CHLORIDE SERPL-SCNC: 101 MMOL/L (ref 98–107)
CO2 SERPL-SCNC: 30 MMOL/L (ref 22–29)
DEPRECATED NEUTROPHILS # BLD AUTO: 6.9 10*3/UL (ref 2.1–6.9)
EGFRCR SERPLBLD CKD-EPI 2021: 40 ML/MIN (ref 60–?)
EOSINOPHIL # BLD AUTO: 0.2 10*3/UL (ref 0–0.4)
EOSINOPHIL NFR BLD AUTO: 1.7 % (ref 0–6)
EOSINOPHIL NFR BLD MANUAL: 3 % (ref 0–7)
ERYTHROCYTE [DISTWIDTH] IN CORD BLOOD: 28.5 % (ref 11.7–14.4)
GLUCOSE SERPLBLD-MCNC: 94 MG/DL (ref 74–118)
HCT VFR BLD AUTO: 26 % (ref 34.2–44.1)
HGB BLD-MCNC: 7.7 G/DL (ref 12–16)
LYMPHOCYTES # BLD: 0.8 10*3/UL (ref 1–3.2)
LYMPHOCYTES NFR BLD AUTO: 8.7 % (ref 18–39.1)
LYMPHOCYTES NFR BLD MANUAL: 16 % (ref 19–48)
MCH RBC QN AUTO: 22.4 PG (ref 28–32)
MCHC RBC AUTO-ENTMCNC: 29.6 G/DL (ref 31–35)
MCV RBC AUTO: 75.8 FL (ref 81–99)
MONOCYTES # BLD AUTO: 0.9 10*3/UL (ref 0.2–0.8)
MONOCYTES NFR BLD AUTO: 10.1 % (ref 4.4–11.3)
MONOCYTES NFR BLD MANUAL: 1 % (ref 3.4–9)
NEUTS BAND NFR BLD MANUAL: 8 %
NEUTS SEG NFR BLD AUTO: 77.6 % (ref 38.7–80)
NEUTS SEG NFR BLD MANUAL: 72 % (ref 40–74)
PLAT MORPH BLD: (no result)
PLATELET # BLD AUTO: 480 X10E3/UL (ref 140–360)
PLATELET # BLD EST: (no result) 10*3/UL
POTASSIUM SERPL-SCNC: 5.2 MMOL/L (ref 3.5–5.1)
RBC # BLD AUTO: 3.43 X10E6/UL (ref 3.6–5.1)
RBC MORPH BLD: (no result)
SODIUM SERPL-SCNC: 138 MMOL/L (ref 136–145)

## 2018-04-22 RX ADMIN — CLONIDINE HYDROCHLORIDE SCH MG: 0.2 TABLET ORAL at 08:54

## 2018-04-22 RX ADMIN — CLONIDINE HYDROCHLORIDE SCH MG: 0.2 TABLET ORAL at 20:40

## 2018-04-22 RX ADMIN — SODIUM CHLORIDE SCH MLS/HR: 9 INJECTION, SOLUTION INTRAVENOUS at 05:21

## 2018-04-22 RX ADMIN — METOPROLOL SUCCINATE SCH MG: 50 TABLET, EXTENDED RELEASE ORAL at 08:54

## 2018-04-22 RX ADMIN — WARFARIN SODIUM SCH MG: 1 TABLET ORAL at 16:23

## 2018-04-22 RX ADMIN — HYDROCODONE BITARTRATE AND ACETAMINOPHEN PRN EA: 5; 325 TABLET ORAL at 20:40

## 2018-04-22 RX ADMIN — PRAMIPEXOLE DIHYDROCHLORIDE SCH MG: 0.25 TABLET ORAL at 20:40

## 2018-04-22 RX ADMIN — ASPIRIN 81 MG CHEWABLE TABLET SCH MG: 81 TABLET CHEWABLE at 08:53

## 2018-04-22 RX ADMIN — PANTOPRAZOLE SODIUM SCH MG: 40 TABLET, DELAYED RELEASE ORAL at 08:54

## 2018-04-22 RX ADMIN — ALLOPURINOL SCH MG: 100 TABLET ORAL at 09:11

## 2018-04-22 RX ADMIN — CLONIDINE HYDROCHLORIDE SCH MG: 0.2 TABLET ORAL at 20:45

## 2018-04-22 RX ADMIN — LEVOTHYROXINE SODIUM SCH MCG: 50 TABLET ORAL at 08:54

## 2018-04-22 RX ADMIN — CLONIDINE HYDROCHLORIDE SCH MG: 0.2 TABLET ORAL at 14:47

## 2018-04-22 RX ADMIN — SODIUM CHLORIDE SCH GM: 9 INJECTION, SOLUTION INTRAVENOUS at 19:05

## 2018-04-22 RX ADMIN — AMLODIPINE BESYLATE SCH MG: 5 TABLET ORAL at 08:54

## 2018-04-22 NOTE — HISTORY AND PHYSICAL
An 82-year-old female comes in with low blood pressure and also a finding 

of high potassium.



HISTORY OF PRESENT ILLNESS:  This is Ms. Arthur with a history of COPD, 

AFib, hypothyroidism, DVT was in her usual state of health on the day of 

admission.  Home health nurse visited the patient.  Was found to have a low 

BP of 70/30.  With that, the patient's lab work which was done in the 

office, analysis showed a potassium of 6.3.  The patient with both these 

findings decided to come to the hospital and got admitted for urinary tract 

infection.  The patient denies any chest pain or shortness of breath.  No 

nausea, vomiting, diarrhea, no constipation, and no rectal bleeding.  No 

abdominal pain or dysuria.  



PAST MEDICAL HISTORY:  History of gout, history of hypertension, history of 

chronic pain, history of hypothyroidism, history of COPD, history of reflux 

esophagitis, history of restless leg syndrome, and history of DVT.  



MEDICATIONS:  She takes at home are:

1.  Allopurinol 400 mg. 

2.  Amlodipine 5 mg.

3.  Aspirin 81 mg.

4.  Biotin capsules.

5.  Clonidine 0.2 mg tablet t.i.d. 

6.  Hydrocodone 5 per 325 mg p.r.n. 

7.  Levothyroxine 50 mcg daily.

8.  Megestrol 40 mg daily.

9.  Metoprolol succinate 50 mg ER.

10. Pantoprazole 40 mg.

11. Pramipexole.

12. Mirapex 0.25 mg daily.

13. Warfarin 1 mg daily.

14. Irbesartan 150 mg twice a day.



SURGICAL HISTORY:  History of hysterectomy and appendectomy.



FAMILY HISTORY:  Hypertension.  



SOCIAL HISTORY:  No ETOH.  History of smoking in the past.  No drug abuse.



REVIEW OF SYSTEMS:  Negative for chest pain.  No shortness of breath.  No 

nausea, vomiting or diarrhea.  No constipation.  No rectal bleeding.  No 

hematochezia.  No hematemesis. Positive for baseline dyspnea, which is 

because of the patient's COPD.  Positive for weakness, fatigue and lower 

extremity weakness.  



PHYSICAL EXAMINATION 

VITALS:  Temperature is 97.5, pulse 78, respirations 20, blood pressure 

120/69, pulse ox 92% on room air.  

HEENT:  Normocephalic and atraumatic.  Pupils reactive to light and 

accommodation. 

CV:  S1 and S2 normal.  Regular rate and rhythm.  

ABDOMEN:  Nontender and nondistended.

EXTREMITIES:  No clubbing.  No cyanosis.  No edema.  



LABORATORY VALUES:  White count was 8.88, hemoglobin 8.3 and 29.2, and 

platelets of 554,000.  Chemistry shows sodium of 136, potassium 5.3, gap is 

13.3, creatinine of 1.5.  GFR was 32.  The earlier labs in my office was 

2.6.  Coags are normal.  Urine shows cloudy with rbcs and nitrites 

positive, and also esterase positive too.  



MICROBIOLOGY:  Urine pending.



ASSESSMENT 

1.  Urinary tract infection.

2.  Hypotension.

3.  Hyperkalemia.

4.  History of chronic obstructive pulmonary disease. 

5.  History of hypertension. 

6.  History of atrial fibrillation. 

7.  Hypothyroidism.

8.  Acute kidney injury.

9.  Chronic diastolic congestive heart failure.  



PLAN:  Continue all home medications.  The patient has been put on Rocephin 

1 g q.24 h.  Will continue morning that.  Also, await for the cultures of 

the urine.  Further recommendations per clinical course.  Potassium will 

watch it.  If needed, Kayexalate will be given.  At this point, there is no 

reason for it.  Will adjust her medications and watch her kidneys closely.  













DD:  04/22/2018 06:54

DT:  04/22/2018 09:59

Job#:  [id number]

## 2018-04-23 VITALS — SYSTOLIC BLOOD PRESSURE: 138 MMHG | DIASTOLIC BLOOD PRESSURE: 65 MMHG

## 2018-04-23 VITALS — DIASTOLIC BLOOD PRESSURE: 60 MMHG | SYSTOLIC BLOOD PRESSURE: 136 MMHG

## 2018-04-23 VITALS — SYSTOLIC BLOOD PRESSURE: 133 MMHG | DIASTOLIC BLOOD PRESSURE: 59 MMHG

## 2018-04-23 VITALS — SYSTOLIC BLOOD PRESSURE: 145 MMHG | DIASTOLIC BLOOD PRESSURE: 65 MMHG

## 2018-04-23 VITALS — SYSTOLIC BLOOD PRESSURE: 116 MMHG | DIASTOLIC BLOOD PRESSURE: 53 MMHG

## 2018-04-23 VITALS — SYSTOLIC BLOOD PRESSURE: 131 MMHG | DIASTOLIC BLOOD PRESSURE: 58 MMHG

## 2018-04-23 LAB
ALBUMIN SERPL-MCNC: 1.7 G/DL (ref 3.5–5)
ALBUMIN/GLOB SERPL: 0.5 {RATIO} (ref 0.8–2)
ALP SERPL-CCNC: 42 IU/L (ref 40–150)
ALT SERPL-CCNC: 8 IU/L (ref 0–55)
ANION GAP SERPL CALC-SCNC: 13 MMOL/L (ref 8–16)
ANISOCYTOSIS BLD QL SMEAR: SLIGHT
BASOPHILS # BLD AUTO: 0 10*3/UL (ref 0–0.1)
BASOPHILS NFR BLD AUTO: 0.3 % (ref 0–1)
BUN SERPL-MCNC: 36 MG/DL (ref 7–26)
BUN/CREAT SERPL: 35 (ref 6–25)
CALCIUM SERPL-MCNC: 9.5 MG/DL (ref 8.4–10.2)
CHLORIDE SERPL-SCNC: 101 MMOL/L (ref 98–107)
CO2 SERPL-SCNC: 27 MMOL/L (ref 22–29)
DEPRECATED NEUTROPHILS # BLD AUTO: 6.4 10*3/UL (ref 2.1–6.9)
EGFRCR SERPLBLD CKD-EPI 2021: 51 ML/MIN (ref 60–?)
ELLIPTOCYTES BLD QL SMEAR: SLIGHT
EOSINOPHIL # BLD AUTO: 0.1 10*3/UL (ref 0–0.4)
EOSINOPHIL NFR BLD AUTO: 1.6 % (ref 0–6)
ERYTHROCYTE [DISTWIDTH] IN CORD BLOOD: 28.7 % (ref 11.7–14.4)
GLOBULIN PLAS-MCNC: 3.3 G/DL (ref 2.3–3.5)
GLUCOSE SERPLBLD-MCNC: 85 MG/DL (ref 74–118)
HCT VFR BLD AUTO: 27.2 % (ref 34.2–44.1)
HGB BLD-MCNC: 7.9 G/DL (ref 12–16)
HYPOCHROMIA BLD QL SMEAR: (no result)
LYMPHOCYTES # BLD: 1 10*3/UL (ref 1–3.2)
LYMPHOCYTES NFR BLD AUTO: 11.4 % (ref 18–39.1)
MCH RBC QN AUTO: 22.1 PG (ref 28–32)
MCHC RBC AUTO-ENTMCNC: 29 G/DL (ref 31–35)
MCV RBC AUTO: 76.2 FL (ref 81–99)
MONOCYTES # BLD AUTO: 0.9 10*3/UL (ref 0.2–0.8)
MONOCYTES NFR BLD AUTO: 10.8 % (ref 4.4–11.3)
NEUTS SEG NFR BLD AUTO: 74.2 % (ref 38.7–80)
PLAT MORPH BLD: NORMAL
PLATELET # BLD AUTO: 431 X10E3/UL (ref 140–360)
PLATELET # BLD EST: ADEQUATE 10*3/UL
POTASSIUM SERPL-SCNC: 5 MMOL/L (ref 3.5–5.1)
RBC # BLD AUTO: 3.57 X10E6/UL (ref 3.6–5.1)
RBC MORPH BLD: NORMAL
SODIUM SERPL-SCNC: 136 MMOL/L (ref 136–145)

## 2018-04-23 RX ADMIN — METOPROLOL SUCCINATE SCH MG: 50 TABLET, EXTENDED RELEASE ORAL at 08:50

## 2018-04-23 RX ADMIN — CLONIDINE HYDROCHLORIDE SCH MG: 0.2 TABLET ORAL at 08:49

## 2018-04-23 RX ADMIN — ALLOPURINOL SCH MG: 100 TABLET ORAL at 08:50

## 2018-04-23 RX ADMIN — HYDROCODONE BITARTRATE AND ACETAMINOPHEN PRN EA: 5; 325 TABLET ORAL at 20:50

## 2018-04-23 RX ADMIN — AMLODIPINE BESYLATE SCH MG: 5 TABLET ORAL at 08:49

## 2018-04-23 RX ADMIN — PRAMIPEXOLE DIHYDROCHLORIDE SCH MG: 0.25 TABLET ORAL at 20:50

## 2018-04-23 RX ADMIN — WARFARIN SODIUM SCH MG: 1 TABLET ORAL at 16:25

## 2018-04-23 RX ADMIN — CLONIDINE HYDROCHLORIDE SCH MG: 0.2 TABLET ORAL at 16:00

## 2018-04-23 RX ADMIN — SODIUM CHLORIDE SCH GM: 9 INJECTION, SOLUTION INTRAVENOUS at 20:50

## 2018-04-23 RX ADMIN — LEVOTHYROXINE SODIUM SCH MCG: 50 TABLET ORAL at 08:49

## 2018-04-23 RX ADMIN — ASPIRIN 81 MG CHEWABLE TABLET SCH MG: 81 TABLET CHEWABLE at 08:49

## 2018-04-23 RX ADMIN — PANTOPRAZOLE SODIUM SCH MG: 40 TABLET, DELAYED RELEASE ORAL at 08:49

## 2018-04-23 RX ADMIN — CLONIDINE HYDROCHLORIDE SCH MG: 0.2 TABLET ORAL at 20:54

## 2018-04-24 VITALS — SYSTOLIC BLOOD PRESSURE: 117 MMHG | DIASTOLIC BLOOD PRESSURE: 58 MMHG

## 2018-04-24 VITALS — DIASTOLIC BLOOD PRESSURE: 54 MMHG | SYSTOLIC BLOOD PRESSURE: 113 MMHG

## 2018-04-24 VITALS — DIASTOLIC BLOOD PRESSURE: 50 MMHG | SYSTOLIC BLOOD PRESSURE: 102 MMHG

## 2018-04-24 VITALS — SYSTOLIC BLOOD PRESSURE: 113 MMHG | DIASTOLIC BLOOD PRESSURE: 62 MMHG

## 2018-04-24 VITALS — DIASTOLIC BLOOD PRESSURE: 54 MMHG | SYSTOLIC BLOOD PRESSURE: 109 MMHG

## 2018-04-24 VITALS — SYSTOLIC BLOOD PRESSURE: 132 MMHG | DIASTOLIC BLOOD PRESSURE: 63 MMHG

## 2018-04-24 RX ADMIN — METOPROLOL SUCCINATE SCH MG: 50 TABLET, EXTENDED RELEASE ORAL at 09:03

## 2018-04-24 RX ADMIN — LEVOTHYROXINE SODIUM SCH MCG: 50 TABLET ORAL at 05:03

## 2018-04-24 RX ADMIN — PANTOPRAZOLE SODIUM SCH MG: 40 TABLET, DELAYED RELEASE ORAL at 09:03

## 2018-04-24 RX ADMIN — MEROPENEM SCH GM: 1 INJECTION INTRAVENOUS at 21:40

## 2018-04-24 RX ADMIN — MEROPENEM SCH GM: 1 INJECTION INTRAVENOUS at 06:34

## 2018-04-24 RX ADMIN — PRAMIPEXOLE DIHYDROCHLORIDE SCH MG: 0.25 TABLET ORAL at 21:40

## 2018-04-24 RX ADMIN — ALLOPURINOL SCH MG: 100 TABLET ORAL at 09:03

## 2018-04-24 RX ADMIN — MEROPENEM SCH GM: 1 INJECTION INTRAVENOUS at 14:00

## 2018-04-24 RX ADMIN — AMLODIPINE BESYLATE SCH MG: 5 TABLET ORAL at 09:03

## 2018-04-24 RX ADMIN — CLONIDINE HYDROCHLORIDE SCH MG: 0.2 TABLET ORAL at 09:03

## 2018-04-24 RX ADMIN — CLONIDINE HYDROCHLORIDE SCH MG: 0.2 TABLET ORAL at 21:00

## 2018-04-24 RX ADMIN — ASPIRIN 81 MG CHEWABLE TABLET SCH MG: 81 TABLET CHEWABLE at 09:03

## 2018-04-24 RX ADMIN — WARFARIN SODIUM SCH MG: 1 TABLET ORAL at 16:55

## 2018-04-24 RX ADMIN — CLONIDINE HYDROCHLORIDE SCH MG: 0.2 TABLET ORAL at 15:00

## 2018-04-24 RX ADMIN — MEGESTROL ACETATE SCH MG: 40 SUSPENSION ORAL at 09:03

## 2018-04-24 RX ADMIN — HYDROCODONE BITARTRATE AND ACETAMINOPHEN PRN EA: 5; 325 TABLET ORAL at 22:24

## 2018-04-25 VITALS — SYSTOLIC BLOOD PRESSURE: 131 MMHG | DIASTOLIC BLOOD PRESSURE: 85 MMHG

## 2018-04-25 VITALS — DIASTOLIC BLOOD PRESSURE: 79 MMHG | SYSTOLIC BLOOD PRESSURE: 181 MMHG

## 2018-04-25 VITALS — DIASTOLIC BLOOD PRESSURE: 74 MMHG | SYSTOLIC BLOOD PRESSURE: 174 MMHG

## 2018-04-25 VITALS — SYSTOLIC BLOOD PRESSURE: 112 MMHG | DIASTOLIC BLOOD PRESSURE: 53 MMHG

## 2018-04-25 VITALS — DIASTOLIC BLOOD PRESSURE: 76 MMHG | SYSTOLIC BLOOD PRESSURE: 156 MMHG

## 2018-04-25 VITALS — DIASTOLIC BLOOD PRESSURE: 61 MMHG | SYSTOLIC BLOOD PRESSURE: 131 MMHG

## 2018-04-25 VITALS — SYSTOLIC BLOOD PRESSURE: 156 MMHG | DIASTOLIC BLOOD PRESSURE: 76 MMHG

## 2018-04-25 LAB
ALBUMIN SERPL-MCNC: 1.9 G/DL (ref 3.5–5)
ALBUMIN/GLOB SERPL: 0.5 {RATIO} (ref 0.8–2)
ALP SERPL-CCNC: 43 IU/L (ref 40–150)
ALT SERPL-CCNC: 8 IU/L (ref 0–55)
ANION GAP SERPL CALC-SCNC: 11.3 MMOL/L (ref 8–16)
ANISOCYTOSIS BLD QL SMEAR: (no result)
ANISOCYTOSIS BLD QL SMEAR: SLIGHT
BASOPHILS # BLD AUTO: 0 10*3/UL (ref 0–0.1)
BASOPHILS # BLD AUTO: 0 10*3/UL (ref 0–0.1)
BASOPHILS NFR BLD AUTO: 0.4 % (ref 0–1)
BASOPHILS NFR BLD AUTO: 0.4 % (ref 0–1)
BUN SERPL-MCNC: 41 MG/DL (ref 7–26)
BUN/CREAT SERPL: 34 (ref 6–25)
CALCIUM SERPL-MCNC: 10.4 MG/DL (ref 8.4–10.2)
CHLORIDE SERPL-SCNC: 103 MMOL/L (ref 98–107)
CO2 SERPL-SCNC: 31 MMOL/L (ref 22–29)
DEPRECATED INR PLAS: 1.02
DEPRECATED NEUTROPHILS # BLD AUTO: 5.8 10*3/UL (ref 2.1–6.9)
DEPRECATED NEUTROPHILS # BLD AUTO: 6.4 10*3/UL (ref 2.1–6.9)
EGFRCR SERPLBLD CKD-EPI 2021: 42 ML/MIN (ref 60–?)
ELLIPTOCYTES BLD QL SMEAR: SLIGHT
EOSINOPHIL # BLD AUTO: 0.1 10*3/UL (ref 0–0.4)
EOSINOPHIL # BLD AUTO: 0.2 10*3/UL (ref 0–0.4)
EOSINOPHIL NFR BLD AUTO: 1.7 % (ref 0–6)
EOSINOPHIL NFR BLD AUTO: 1.9 % (ref 0–6)
ERYTHROCYTE [DISTWIDTH] IN CORD BLOOD: 28.8 % (ref 11.7–14.4)
ERYTHROCYTE [DISTWIDTH] IN CORD BLOOD: 29 % (ref 11.7–14.4)
GLOBULIN PLAS-MCNC: 3.6 G/DL (ref 2.3–3.5)
GLUCOSE SERPLBLD-MCNC: 87 MG/DL (ref 74–118)
HCT VFR BLD AUTO: 26.7 % (ref 34.2–44.1)
HCT VFR BLD AUTO: 29.6 % (ref 34.2–44.1)
HGB BLD-MCNC: 7.9 G/DL (ref 12–16)
HGB BLD-MCNC: 8.7 G/DL (ref 12–16)
HOWELL-JOLLY BOD BLD QL SMEAR: (no result)
HYPOCHROMIA BLD QL SMEAR: (no result)
HYPOCHROMIA BLD QL SMEAR: SLIGHT
IRON SATN MFR SERPL: 23 % (ref 15–50)
IRON SERPL-MCNC: 50 UG/DL (ref 50–170)
LYMPHOCYTES # BLD: 1 10*3/UL (ref 1–3.2)
LYMPHOCYTES # BLD: 1 10*3/UL (ref 1–3.2)
LYMPHOCYTES NFR BLD AUTO: 12 % (ref 18–39.1)
LYMPHOCYTES NFR BLD AUTO: 12.9 % (ref 18–39.1)
MCH RBC QN AUTO: 22.4 PG (ref 28–32)
MCH RBC QN AUTO: 22.5 PG (ref 28–32)
MCHC RBC AUTO-ENTMCNC: 29.4 G/DL (ref 31–35)
MCHC RBC AUTO-ENTMCNC: 29.6 G/DL (ref 31–35)
MCV RBC AUTO: 76.1 FL (ref 81–99)
MCV RBC AUTO: 76.1 FL (ref 81–99)
MONOCYTES # BLD AUTO: 0.7 10*3/UL (ref 0.2–0.8)
MONOCYTES # BLD AUTO: 0.8 10*3/UL (ref 0.2–0.8)
MONOCYTES NFR BLD AUTO: 8.8 % (ref 4.4–11.3)
MONOCYTES NFR BLD AUTO: 9.2 % (ref 4.4–11.3)
NEUTS SEG NFR BLD AUTO: 74.5 % (ref 38.7–80)
NEUTS SEG NFR BLD AUTO: 74.9 % (ref 38.7–80)
PLAT MORPH BLD: (no result)
PLAT MORPH BLD: NORMAL
PLATELET # BLD AUTO: 442 X10E3/UL (ref 140–360)
PLATELET # BLD AUTO: 508 X10E3/UL (ref 140–360)
PLATELET # BLD EST: (no result) 10*3/UL
PLATELET # BLD EST: ADEQUATE 10*3/UL
POIKILOCYTOSIS BLD QL SMEAR: (no result)
POTASSIUM SERPL-SCNC: 6.3 MMOL/L (ref 3.5–5.1)
PROTHROMBIN TIME: 12.6 SECONDS (ref 11.9–14.5)
RBC # BLD AUTO: 3.51 X10E6/UL (ref 3.6–5.1)
RBC # BLD AUTO: 3.89 X10E6/UL (ref 3.6–5.1)
RBC MORPH BLD: (no result)
RBC MORPH BLD: NORMAL
SODIUM SERPL-SCNC: 139 MMOL/L (ref 136–145)
TARGETS BLD QL SMEAR: (no result)
TIBC SERPL-MCNC: 213 UG/DL (ref 261–478)
TRANSFERRIN SERPL-MCNC: 152 MG/DL (ref 180–382)

## 2018-04-25 RX ADMIN — WARFARIN SODIUM SCH MG: 1 TABLET ORAL at 17:15

## 2018-04-25 RX ADMIN — MEROPENEM SCH GM: 1 INJECTION INTRAVENOUS at 05:19

## 2018-04-25 RX ADMIN — METOPROLOL SUCCINATE SCH MG: 50 TABLET, EXTENDED RELEASE ORAL at 09:56

## 2018-04-25 RX ADMIN — HYDROCODONE BITARTRATE AND ACETAMINOPHEN PRN EA: 5; 325 TABLET ORAL at 22:03

## 2018-04-25 RX ADMIN — PANTOPRAZOLE SODIUM SCH MG: 40 TABLET, DELAYED RELEASE ORAL at 09:56

## 2018-04-25 RX ADMIN — CLONIDINE HYDROCHLORIDE SCH MG: 0.2 TABLET ORAL at 09:56

## 2018-04-25 RX ADMIN — SODIUM CHLORIDE SCH MLS/HR: 9 INJECTION, SOLUTION INTRAVENOUS at 11:41

## 2018-04-25 RX ADMIN — PRAMIPEXOLE DIHYDROCHLORIDE SCH MG: 0.25 TABLET ORAL at 22:03

## 2018-04-25 RX ADMIN — MEROPENEM SCH GM: 1 INJECTION INTRAVENOUS at 15:49

## 2018-04-25 RX ADMIN — MEGESTROL ACETATE SCH MG: 40 SUSPENSION ORAL at 09:56

## 2018-04-25 RX ADMIN — ASPIRIN 81 MG CHEWABLE TABLET SCH MG: 81 TABLET CHEWABLE at 09:56

## 2018-04-25 RX ADMIN — CLONIDINE HYDROCHLORIDE SCH MG: 0.2 TABLET ORAL at 15:00

## 2018-04-25 RX ADMIN — HYDRALAZINE HYDROCHLORIDE PRN MG: 20 INJECTION INTRAMUSCULAR; INTRAVENOUS at 23:15

## 2018-04-25 RX ADMIN — HYDRALAZINE HYDROCHLORIDE PRN MG: 20 INJECTION INTRAMUSCULAR; INTRAVENOUS at 17:15

## 2018-04-25 RX ADMIN — AMLODIPINE BESYLATE SCH MG: 10 TABLET ORAL at 09:56

## 2018-04-25 RX ADMIN — LEVOTHYROXINE SODIUM SCH MCG: 50 TABLET ORAL at 05:19

## 2018-04-25 RX ADMIN — ALLOPURINOL SCH MG: 100 TABLET ORAL at 09:56

## 2018-04-25 NOTE — CONSULTATION
DATE OF CONSULTATION:  April 25, 2018 



HISTORY OF PRESENT ILLNESS:  Ms. Kindra Arthur is a delightful 

83-year-old white female with underlying history of hypertension, 

hypothyroidism, renal consult for hyperkalemia and acute kidney injury.  

Patient has abnormal kidney function in the past, but states that lately 

her potassium level has been running on the higher side.



She is currently awake, alert, delightful lady, in no apparent distress.  

Denies nausea, vomiting, shortness of breath.



Denies any diarrhea.



ALLERGIES:  TO STATIN, HMG-COA REDUCTASE INHIBITORS, SULFA, AND  LEVAQUIN.



CURRENT MEDICATIONS:  Patient is on meropenem for pseudomonas UTI.  She is 

on warfarin 0.5 mg p.o. daily.  She is on Mirapex p.r.n., Protonix.  She 

takes Megace 400 mg daily, Levothyroxine 50 mcg daily.  She is on clonidine 

0.2 p.o. t.i.d., aspirin to chew, amlodipine 10 mg daily, allopurinol 100 

mg daily, diphenhydramine p.r.n., and famotidine 20 mg IV.



SOCIAL HISTORY:  Does not smoke or drink.  Fairly independent lady.  Denies 

any smoking or alcohol use.



FAMILY HISTORY:  Significant for hypertension.

 

PHYSICAL EXAMINATION

GENERAL:  Awake, alert, lying supine, thin-built female, very poor muscle 

mass.

VITAL SIGNS:  Blood pressure 113/54, pulse rate 78, afebrile, oxygen 

saturation 98% on 2 liters nasal cannula. 

HEAD AND NECK:  Corneae clear.  Arcus senilis noted.  No JVD. 

LUNGS:  Relatively clear. 

HEART:  S1, S2 audible. 

ABDOMEN:  Soft, nontender. 

LOWER EXTREMITY:  No edema. 



LABORATORY DATA:  Shows anemia with hemoglobin 8.7 with a low MCV of 76, 

platelets are elevated at 508.  Potassium of 6.1 with a creatinine 1.22, 

bicarbonate 31, calcium 10.4 with an albumin of 1.9 and also 

hypomagnesemic.



IMPRESSION

1. Hyperkalemia, multifactorial.

2. Most likely iron deficiency anemia.

3. Hypercalcemia.



We will do workup to start IV normal saline.  We will give Kayexalate and 

lactulose.  Place on a renal potassium restricted diet.  Renal workup 

ordered including kidney ultrasound.  Discussed with patient.  Will need IV 

iron if iron deficiency is confirmed.  Has complicated urinary tract 

infection, pseudomonas, currently receiving antibiotics.  Please see 

orders.









DD:  04/25/2018 09:47

DT:  04/25/2018 11:02

Job#:  [id number]

## 2018-04-26 VITALS — SYSTOLIC BLOOD PRESSURE: 158 MMHG | DIASTOLIC BLOOD PRESSURE: 70 MMHG

## 2018-04-26 VITALS — SYSTOLIC BLOOD PRESSURE: 162 MMHG | DIASTOLIC BLOOD PRESSURE: 70 MMHG

## 2018-04-26 VITALS — SYSTOLIC BLOOD PRESSURE: 163 MMHG | DIASTOLIC BLOOD PRESSURE: 70 MMHG

## 2018-04-26 VITALS — SYSTOLIC BLOOD PRESSURE: 166 MMHG | DIASTOLIC BLOOD PRESSURE: 70 MMHG

## 2018-04-26 VITALS — DIASTOLIC BLOOD PRESSURE: 56 MMHG | SYSTOLIC BLOOD PRESSURE: 180 MMHG

## 2018-04-26 VITALS — DIASTOLIC BLOOD PRESSURE: 74 MMHG | SYSTOLIC BLOOD PRESSURE: 170 MMHG

## 2018-04-26 VITALS — SYSTOLIC BLOOD PRESSURE: 189 MMHG | DIASTOLIC BLOOD PRESSURE: 78 MMHG

## 2018-04-26 VITALS — SYSTOLIC BLOOD PRESSURE: 150 MMHG | DIASTOLIC BLOOD PRESSURE: 62 MMHG

## 2018-04-26 LAB
ALBUMIN SERPL-MCNC: 2.1 G/DL (ref 3.5–5)
ALBUMIN/GLOB SERPL: 0.5 {RATIO} (ref 0.8–2)
ALP SERPL-CCNC: 44 IU/L (ref 40–150)
ALT SERPL-CCNC: 10 IU/L (ref 0–55)
ANION GAP SERPL CALC-SCNC: 14.7 MMOL/L (ref 8–16)
BUN SERPL-MCNC: 33 MG/DL (ref 7–26)
BUN/CREAT SERPL: 31 (ref 6–25)
CALCIUM SERPL-MCNC: 10.7 MG/DL (ref 8.4–10.2)
CHLORIDE SERPL-SCNC: 106 MMOL/L (ref 98–107)
CK MB SERPL-MCNC: 0.8 NG/ML (ref 0–5)
CK MB SERPL-MCNC: 0.9 NG/ML (ref 0–5)
CK MB SERPL-MCNC: 1 NG/ML (ref 0–5)
CK SERPL-CCNC: 7 IU/L (ref 29–168)
CK SERPL-CCNC: 8 IU/L (ref 29–168)
CK SERPL-CCNC: 9 IU/L (ref 29–168)
CO2 SERPL-SCNC: 28 MMOL/L (ref 22–29)
DEPRECATED INR PLAS: 1.16
EGFRCR SERPLBLD CKD-EPI 2021: 49 ML/MIN (ref 60–?)
GLOBULIN PLAS-MCNC: 3.9 G/DL (ref 2.3–3.5)
GLUCOSE SERPLBLD-MCNC: 128 MG/DL (ref 74–118)
POTASSIUM SERPL-SCNC: 4.7 MMOL/L (ref 3.5–5.1)
PROTHROMBIN TIME: 13.9 SECONDS (ref 11.9–14.5)
SODIUM SERPL-SCNC: 144 MMOL/L (ref 136–145)

## 2018-04-26 RX ADMIN — SODIUM CHLORIDE SCH MLS/HR: 9 INJECTION, SOLUTION INTRAVENOUS at 05:50

## 2018-04-26 RX ADMIN — PANTOPRAZOLE SODIUM SCH MG: 40 TABLET, DELAYED RELEASE ORAL at 08:21

## 2018-04-26 RX ADMIN — MEROPENEM SCH GM: 1 INJECTION INTRAVENOUS at 22:26

## 2018-04-26 RX ADMIN — LEVOTHYROXINE SODIUM SCH MCG: 50 TABLET ORAL at 06:06

## 2018-04-26 RX ADMIN — MEGESTROL ACETATE SCH MG: 40 SUSPENSION ORAL at 08:21

## 2018-04-26 RX ADMIN — METOPROLOL SUCCINATE SCH MG: 50 TABLET, EXTENDED RELEASE ORAL at 16:58

## 2018-04-26 RX ADMIN — NITROGLYCERIN PRN MG: 0.4 TABLET SUBLINGUAL at 08:39

## 2018-04-26 RX ADMIN — METOPROLOL TARTRATE PRN MG: 1 INJECTION, SOLUTION INTRAVENOUS at 11:59

## 2018-04-26 RX ADMIN — ALLOPURINOL SCH MG: 100 TABLET ORAL at 08:21

## 2018-04-26 RX ADMIN — MEROPENEM SCH GM: 1 INJECTION INTRAVENOUS at 13:48

## 2018-04-26 RX ADMIN — HYDROCODONE BITARTRATE AND ACETAMINOPHEN PRN EA: 5; 325 TABLET ORAL at 15:16

## 2018-04-26 RX ADMIN — DEXTROSE MONOHYDRATE SCH MLS/HR: 50 INJECTION, SOLUTION INTRAVENOUS at 16:58

## 2018-04-26 RX ADMIN — METOPROLOL SUCCINATE SCH MG: 50 TABLET, EXTENDED RELEASE ORAL at 08:21

## 2018-04-26 RX ADMIN — ASPIRIN 81 MG CHEWABLE TABLET SCH MG: 81 TABLET CHEWABLE at 08:21

## 2018-04-26 RX ADMIN — MEROPENEM SCH GM: 1 INJECTION INTRAVENOUS at 06:38

## 2018-04-26 RX ADMIN — AMLODIPINE BESYLATE SCH MG: 10 TABLET ORAL at 08:21

## 2018-04-26 RX ADMIN — HYDROCODONE BITARTRATE AND ACETAMINOPHEN PRN EA: 5; 325 TABLET ORAL at 22:27

## 2018-04-26 RX ADMIN — NITROGLYCERIN PRN MG: 0.4 TABLET SUBLINGUAL at 08:26

## 2018-04-26 RX ADMIN — MEROPENEM SCH GM: 1 INJECTION INTRAVENOUS at 00:01

## 2018-04-26 RX ADMIN — SODIUM CHLORIDE SCH MLS/HR: 9 INJECTION, SOLUTION INTRAVENOUS at 07:35

## 2018-04-26 RX ADMIN — METOPROLOL TARTRATE PRN MG: 1 INJECTION, SOLUTION INTRAVENOUS at 01:02

## 2018-04-26 RX ADMIN — WARFARIN SODIUM SCH MG: 1 TABLET ORAL at 16:57

## 2018-04-26 RX ADMIN — PRAMIPEXOLE DIHYDROCHLORIDE SCH MG: 0.25 TABLET ORAL at 22:26

## 2018-04-26 NOTE — CONSULTATION
DATE OF CONSULTATION:  April 26, 2018 



CARDIOLOGY CONSULTATION



REQUESTING PHYSICIAN:  Dr. Long Cunningham.



REASON FOR CONSULTATION:  Chest pain.



HISTORY OF PRESENT ILLNESS:  This is an 83-year-old woman well known to our 

service with history of DVT/PE, chronic diastolic heart failure, 

hypertension, chronic kidney disease and COPD, who presented to [facility] ER with complaints of hypotension. She states she was in her 

usual state of health until the day of admission. Her home health nurse 

visited and found her to be hypotensive with a blood pressure of 70/30. 

Labs were done, and the patient was noted to be hyperkalemic with a 

potassium of 6.3. On presentation to the ER she was found to have acute 

kidney injury with a creatinine of 1.55 and hyperkalemia with a potassium 

of 5.3. BNP was elevated at 159, and she was found to have an abnormal UA 

with 1+ protein, 1+ blood, positive nitrites, 2+ leukocyte esterase, 6-10 

RBCs and greater than 50 WBCs. She was, therefore, admitted for a urinary 

tract infection. She states she was doing well during admission until she 

developed chest pain last night. She describes the pain as a pressure that 

was 8 out of 10 in severity that lasted all night. There was no radiation, 

shortness of breath, nausea or diaphoresis. She denied any orthopnea, PND, 

lightheadedness or syncope. Cardiology is consulted for management of 

above.



REVIEW OF SYSTEMS:  Negative except as per HPI.



PAST MEDICAL HISTORY

1. Chronic diastolic heart failure.

2. History of DVT/PE.

3. Hypertension.

4. Reported history of atrial fibrillation. 

5. Chronic kidney disease.

6. COPD.



PAST SURGICAL HISTORY

1. Hysterectomy.

2. Tubal ligation.

3. Appendectomy.



ALLERGIES:  PLEASE SEE EMR.



MEDICATIONS:  Please see medication list.



SOCIAL HISTORY:  Prior smoker. No alcohol.



FAMILY HISTORY:  Noncontributory.



PHYSICAL EXAMINATION

VITAL SIGNS:  Temperature 98.5 degrees, pulse 119, respiratory rate 18, 

blood pressure 166/70, oxygen saturation 99% on nasal cannula.

GENERAL:  Elderly woman, cachectic, frail, in no acute distress.

HEENT:  Normocephalic, atraumatic. Pupils equal, no scleral icterus.

NECK:  Supple. No thyromegaly or cervical lymphadenopathy, no carotid 

bruits.

LUNGS:  Clear to auscultation bilaterally. No wheezes or crackles.

CARDIOVASCULAR:  Normal rate, regular rhythm. No murmur. Normal S1 and S2. 

ABDOMEN:  Soft, nontender.

EXTREMITIES:  1+ pitting edema on dependent areas. 

NEURO:  Nonfocal exam.



LABS:  Sodium 144, potassium 4.7, chloride 106, CO2 28, BUN 33, creatinine 

1.07. INR 1.16. Urine culture:  Pseudomonas aeruginosa.



IMPRESSION

1. Chest pain.

2. Pseudomonas aeruginosa urinary tract infection.

3. Acute kidney injury on chronic kidney disease, improving.

4. Iron deficiency anemia.

5. Acute-on-chronic diastolic heart failure.

6. History of deep vein thrombosis/pulmonary embolism.

7. Reported history of atrial fibrillation.

8. Hypertension, uncontrolled.

9. Sinus tachycardia.



RECOMMENDATIONS:   Patient's EKG demonstrates sinus tachycardia with PACs 

with nonspecific anterior T-wave changes. Trend cardiac biomarkers to rule 

out myocardial infarction. Increase metoprolol succinate. Continue current 

medications otherwise. Patient's INR is subtherapeutic. We will continue 

for now given patient's prior issues with nutrition status and will discuss 

with Nephrology if we can resume her outpatient ARB.



Thank you for this consult. We will continue to follow.





 





DD:  04/26/2018 11:23

DT:  04/26/2018 13:02

Job#:  [id number]

## 2018-04-27 VITALS — SYSTOLIC BLOOD PRESSURE: 166 MMHG | DIASTOLIC BLOOD PRESSURE: 73 MMHG

## 2018-04-27 VITALS — SYSTOLIC BLOOD PRESSURE: 161 MMHG | DIASTOLIC BLOOD PRESSURE: 69 MMHG

## 2018-04-27 VITALS — DIASTOLIC BLOOD PRESSURE: 63 MMHG | SYSTOLIC BLOOD PRESSURE: 135 MMHG

## 2018-04-27 VITALS — SYSTOLIC BLOOD PRESSURE: 152 MMHG | DIASTOLIC BLOOD PRESSURE: 67 MMHG

## 2018-04-27 VITALS — DIASTOLIC BLOOD PRESSURE: 59 MMHG | SYSTOLIC BLOOD PRESSURE: 124 MMHG

## 2018-04-27 VITALS — DIASTOLIC BLOOD PRESSURE: 73 MMHG | SYSTOLIC BLOOD PRESSURE: 175 MMHG

## 2018-04-27 LAB
ALBUMIN SERPL-MCNC: 2.1 G/DL (ref 3.5–5)
ALBUMIN/GLOB SERPL: 0.6 {RATIO} (ref 0.8–2)
ALP SERPL-CCNC: 41 IU/L (ref 40–150)
ALT SERPL-CCNC: 9 IU/L (ref 0–55)
ANION GAP SERPL CALC-SCNC: 10.4 MMOL/L (ref 8–16)
BUN SERPL-MCNC: 31 MG/DL (ref 7–26)
BUN/CREAT SERPL: 33 (ref 6–25)
CALCIUM SERPL-MCNC: 10.6 MG/DL (ref 8.4–10.2)
CHLORIDE SERPL-SCNC: 103 MMOL/L (ref 98–107)
CO2 SERPL-SCNC: 30 MMOL/L (ref 22–29)
DEPRECATED INR PLAS: 1.19
EGFRCR SERPLBLD CKD-EPI 2021: 58 ML/MIN (ref 60–?)
GLOBULIN PLAS-MCNC: 3.5 G/DL (ref 2.3–3.5)
GLUCOSE SERPLBLD-MCNC: 84 MG/DL (ref 74–118)
POTASSIUM SERPL-SCNC: 4.4 MMOL/L (ref 3.5–5.1)
PROTHROMBIN TIME: 14.2 SECONDS (ref 11.9–14.5)
SODIUM SERPL-SCNC: 139 MMOL/L (ref 136–145)

## 2018-04-27 RX ADMIN — ALLOPURINOL SCH MG: 100 TABLET ORAL at 08:58

## 2018-04-27 RX ADMIN — PRAMIPEXOLE DIHYDROCHLORIDE SCH MG: 0.25 TABLET ORAL at 21:00

## 2018-04-27 RX ADMIN — WARFARIN SODIUM SCH MG: 1 TABLET ORAL at 17:00

## 2018-04-27 RX ADMIN — CALCITONIN SALMON SCH UNIT: 200 INJECTION, SOLUTION INTRAMUSCULAR; SUBCUTANEOUS at 08:58

## 2018-04-27 RX ADMIN — ASPIRIN 81 MG CHEWABLE TABLET SCH MG: 81 TABLET CHEWABLE at 08:57

## 2018-04-27 RX ADMIN — LEVOTHYROXINE SODIUM SCH MCG: 50 TABLET ORAL at 06:16

## 2018-04-27 RX ADMIN — CALCITONIN SALMON SCH UNIT: 200 INJECTION, SOLUTION INTRAMUSCULAR; SUBCUTANEOUS at 09:00

## 2018-04-27 RX ADMIN — FUROSEMIDE SCH MG: 20 TABLET ORAL at 08:57

## 2018-04-27 RX ADMIN — AMLODIPINE BESYLATE SCH MG: 10 TABLET ORAL at 08:58

## 2018-04-27 RX ADMIN — CALCITONIN SALMON SCH UNIT: 200 INJECTION, SOLUTION INTRAMUSCULAR; SUBCUTANEOUS at 21:00

## 2018-04-27 RX ADMIN — MEGESTROL ACETATE SCH MG: 40 SUSPENSION ORAL at 08:57

## 2018-04-27 RX ADMIN — PANTOPRAZOLE SODIUM SCH MG: 40 TABLET, DELAYED RELEASE ORAL at 08:58

## 2018-04-27 RX ADMIN — DEXTROSE MONOHYDRATE SCH MLS/HR: 50 INJECTION, SOLUTION INTRAVENOUS at 02:30

## 2018-04-27 RX ADMIN — MEROPENEM SCH GM: 1 INJECTION INTRAVENOUS at 14:00

## 2018-04-27 RX ADMIN — METOPROLOL SUCCINATE SCH MG: 50 TABLET, EXTENDED RELEASE ORAL at 08:58

## 2018-04-27 RX ADMIN — PROMETHAZINE HYDROCHLORIDE PRN MG: 25 INJECTION, SOLUTION INTRAMUSCULAR; INTRAVENOUS at 21:32

## 2018-04-27 RX ADMIN — METOPROLOL SUCCINATE SCH MG: 50 TABLET, EXTENDED RELEASE ORAL at 17:00

## 2018-04-27 RX ADMIN — MEROPENEM SCH GM: 1 INJECTION INTRAVENOUS at 21:32

## 2018-04-27 RX ADMIN — MEROPENEM SCH GM: 1 INJECTION INTRAVENOUS at 06:16

## 2018-04-27 NOTE — PROGRESS NOTE
DATE:    



Denies any nausea or vomiting.  On IV fluids.  Denies any trouble breathing 

at this time.  Calcium has been high, and possibly some fluid overload on 

the x-ray.



PHYSICAL EXAMINATION:

GENERAL:  Lying in bed, no distress as such.

VITAL SIGNS:  Temperature 97.9, pulse 86, blood pressure 166/70.

CHEST:  Faint crackles at the bases.

CARDIAC:  Normal heart tones.  Rhythm sounds regular.

ABDOMEN:  Benign.

EXTREMITIES:  Trace edema.



LABS:  Hemoglobin 7.9.  Calcium 10.6.  BUN 31, creatinine 1, estimated GFR 

58 mL per minute.  K is 4.4.  PTH was suppressed at 13.  Phosphorus is 3.5.



ASSESSMENT:  Hypercalcemia, possibly chronic kidney disease stage 2 or 3.  

Source of hypercalcemia is unclear at this time.



PLAN:  Six doses of calcitonin.  Serial chemistries.  Await vitamin D 

level.  Check serum and urine protein electrophoresis studies.  Will follow 

along.







DD:  04/27/2018 08:04

DT:  04/27/2018 08:20

Job#:  [id number]

## 2018-04-27 NOTE — PROGRESS NOTE
DATE:  April 27, 2018 



CARDIOLOGY PROGRESS NOTE



SUBJECTIVE:  Patient denies chest pain or shortness of breath. 



OBJECTIVE

VITAL SIGNS:  Temperature 98 degrees, pulse 79, respiratory rate 20, blood 

pressure 135/63, oxygen saturation 100% on 3 liters nasal cannula.

GENERAL:  Elderly woman, cachectic, frail, no acute distress.

LUNGS:  Clear to auscultation bilaterally. No wheezes or crackles. 

CARDIOVASCULAR:  Normal rate, regular rhythm. No murmur. Normal S1 and S2. 

ABDOMEN:  Soft, nontender. 

EXTREMITIES:  1+ pitting edema dependent areas. 



CARDIAC MEDICATIONS

1. Warfarin 0.5 mg p.o. daily.

2. Metoprolol succinate 100 mg p.o. b.i.d. 

3. Amlodipine 10 mg p.o. daily.

4. Furosemide 20 mg p.o. daily.

5. Aspirin 81 mg p.o. daily.

6. Levothyroxine 50 mcg p.o. daily.



LABS:  Sodium 139, potassium 4.4, chloride 103, CO2 30, BUN 31, creatinine 

0.93. INR 1.19.



TELEMETRY:  Normal sinus rhythm.



IMPRESSION

1. Chest pain.

2. Pseudomonas aeruginosa urinary tract infection.

3. Acute kidney injury on chronic kidney disease, improving.

4. Iron deficiency anemia.

5. Acute-on-chronic diastolic heart failure.

6. History of deep vein thrombosis/pulmonary embolism.

7. Reported history of atrial fibrillation.

8. Hypertension, uncontrolled.

9. Sinus tachycardia, resolved.



RECOMMENDATIONS:  Patient ruled out for a myocardial infarction with serial 

cardiac biomarkers. Heart rate is improved with increase in metoprolol. 

Continue current cardiac medications. Monitor patient on telemetry. Given 

patient's recent acute kidney injury and frailty, she is not a candidate 

for invasive cardiac evaluation at this time. Continue medical management. 

Patient's INR is subtherapeutic. Plan to increase warfarin to 1 mg if no 

procedures are planned. Antibiotics per primary service. 



Thank you for this consult. We will continue to follow.













DD:  04/27/2018 17:36

DT:  04/27/2018 17:44

Job#:  [id number]

## 2018-04-27 NOTE — CONSULTATION
DATE OF CONSULTATION:  April 25, 2018 



CONSULTATION TO:  Dr. Cunningham



Ms. Arthur is an 83-year-old white female who was referred to me for 

evaluation of anemia.  The patient is very well known to me.  The last  I 

saw her was 3/22/2018 when she was referred to me by Dr. Zayas for anemia. 

 CBC showed a hemoglobin of 7.7 dated 2/26/2018, low MCV of 71.8, low MCHC 

of 27.5 with platelets of 523,000.  The patient had complained of swelling 

of both legs, feeling fatigued, diarrhea.  Had blood clots in the legs in 

the past.  



The patient was on:

1. Protonix.

2. Metoprolol.

3. Synthroid.

4. Norco.

5. Allopurinol.

6. Amlodipine.

7. Irbesartan.

8. Aspirin.

9. Colchicine.  



Subsequently, iron deficiency anemia diagnosis, essential hypertension and 

hypothyroidism were made.  The patient was suggested to have a thorough GI 

and  workup including a CAT scan of the abdomen and pelvis with contrast. 

 Subsequently, she ends up here with about the same hemoglobin of 8.7.  



SOCIAL HISTORY:  Noncontributory.



FAMILY HISTORY:  Noncontributory.



ALLERGIES:  REPORTED NONE.



MEDICATIONS:  The list is as I dictated.  



REVIEW OF SYSTEMS

HEENT:  Normal. 

CARDIAC:  History of hypertension.

RESPIRATORY:  History of COPD. 

GI:  Normal. 

:  Normal. 

MUSCULOSKELETAL:  Normal.

SKIN AND BREASTS:  Normal. 

NEUROENDOCRINE:  History of hypothyroidism. 

 

PHYSICAL EXAMINATION

GENERAL:  A rather thin-built female.  Anemic.  No palpable adenopathy. 

HEART:  Within normal limits. 

LUNGS:  Clear. 

ABDOMEN:  Obese.  There is no hepatosplenomegaly.  

RECTAL:  Exam deferred.

CENTRAL NERVOUS SYSTEM:  Essentially normal. 



LABS:  Hemoglobin of 8.3, MCHC low at 28.4, platelets high at 508,000.  

Potassium reported high at 6.3.  Protein is low at 5.  Albumin low at 1.7. 



IMPRESSION

1. Iron deficiency anemia.

2. Secondary thrombocythemia.  

3. Hyperkalemia.

4. Hypoproteinemia.

5. Hypoalbuminemia.

6. Congestive heart failure.

7. History of chronic obstructive pulmonary disease.

8. History of gout.

9. History of hypertension.

10. History of hypothyroidism.

11. Hypotension. 

12. Urinary tract infection. 



PLAN, COMMENTS AND SUGGESTIONS:  Suggest stool for occult blood.  Suggest 

INFeD. If the CAT scans have not been done, suggest a CAT scan.  

 





DD:  04/27/2018 10:30

DT:  04/27/2018 11:28

Job#:  [id number]

## 2018-04-28 VITALS — SYSTOLIC BLOOD PRESSURE: 169 MMHG | DIASTOLIC BLOOD PRESSURE: 74 MMHG

## 2018-04-28 VITALS — DIASTOLIC BLOOD PRESSURE: 68 MMHG | SYSTOLIC BLOOD PRESSURE: 161 MMHG

## 2018-04-28 VITALS — DIASTOLIC BLOOD PRESSURE: 75 MMHG | SYSTOLIC BLOOD PRESSURE: 155 MMHG

## 2018-04-28 VITALS — DIASTOLIC BLOOD PRESSURE: 74 MMHG | SYSTOLIC BLOOD PRESSURE: 185 MMHG

## 2018-04-28 VITALS — SYSTOLIC BLOOD PRESSURE: 185 MMHG | DIASTOLIC BLOOD PRESSURE: 77 MMHG

## 2018-04-28 VITALS — SYSTOLIC BLOOD PRESSURE: 178 MMHG | DIASTOLIC BLOOD PRESSURE: 73 MMHG

## 2018-04-28 VITALS — DIASTOLIC BLOOD PRESSURE: 77 MMHG | SYSTOLIC BLOOD PRESSURE: 185 MMHG

## 2018-04-28 LAB
ANION GAP SERPL CALC-SCNC: 12.4 MMOL/L (ref 8–16)
BASOPHILS # BLD AUTO: 0 10*3/UL (ref 0–0.1)
BASOPHILS NFR BLD AUTO: 0.3 % (ref 0–1)
BUN SERPL-MCNC: 27 MG/DL (ref 7–26)
BUN/CREAT SERPL: 35 (ref 6–25)
CALCIUM SERPL-MCNC: 10.3 MG/DL (ref 8.4–10.2)
CHLORIDE SERPL-SCNC: 102 MMOL/L (ref 98–107)
CO2 SERPL-SCNC: 29 MMOL/L (ref 22–29)
DEPRECATED INR PLAS: 1.08
DEPRECATED NEUTROPHILS # BLD AUTO: 9.9 10*3/UL (ref 2.1–6.9)
DEPRECATED PHOSPHATE SERPL-MCNC: 2.6 MG/DL (ref 2.3–4.7)
EGFRCR SERPLBLD CKD-EPI 2021: > 60 ML/MIN (ref 60–?)
EOSINOPHIL # BLD AUTO: 0 10*3/UL (ref 0–0.4)
EOSINOPHIL NFR BLD AUTO: 0.1 % (ref 0–6)
EOSINOPHIL NFR BLD MANUAL: 1 % (ref 0–7)
ERYTHROCYTE [DISTWIDTH] IN CORD BLOOD: 29.2 % (ref 11.7–14.4)
GLUCOSE SERPLBLD-MCNC: 82 MG/DL (ref 74–118)
HCT VFR BLD AUTO: 29.1 % (ref 34.2–44.1)
HGB BLD-MCNC: 8.6 G/DL (ref 12–16)
LYMPHOCYTES # BLD: 1.5 10*3/UL (ref 1–3.2)
LYMPHOCYTES NFR BLD AUTO: 11.9 % (ref 18–39.1)
LYMPHOCYTES NFR BLD MANUAL: 14 % (ref 19–48)
MCH RBC QN AUTO: 22.6 PG (ref 28–32)
MCHC RBC AUTO-ENTMCNC: 29.6 G/DL (ref 31–35)
MCV RBC AUTO: 76.4 FL (ref 81–99)
MONOCYTES # BLD AUTO: 0.8 10*3/UL (ref 0.2–0.8)
MONOCYTES NFR BLD AUTO: 6.3 % (ref 4.4–11.3)
MONOCYTES NFR BLD MANUAL: 6 % (ref 3.4–9)
NEUTS SEG NFR BLD AUTO: 79.7 % (ref 38.7–80)
NEUTS SEG NFR BLD MANUAL: 79 % (ref 40–74)
PLAT MORPH BLD: NORMAL
PLATELET # BLD AUTO: 460 X10E3/UL (ref 140–360)
PLATELET # BLD EST: (no result) 10*3/UL
POTASSIUM SERPL-SCNC: 4.4 MMOL/L (ref 3.5–5.1)
PROTHROMBIN TIME: 13.2 SECONDS (ref 11.9–14.5)
RBC # BLD AUTO: 3.81 X10E6/UL (ref 3.6–5.1)
RBC MORPH BLD: NORMAL
SODIUM SERPL-SCNC: 139 MMOL/L (ref 136–145)

## 2018-04-28 RX ADMIN — PROMETHAZINE HYDROCHLORIDE PRN MG: 25 INJECTION, SOLUTION INTRAMUSCULAR; INTRAVENOUS at 14:30

## 2018-04-28 RX ADMIN — PANTOPRAZOLE SODIUM SCH MLS/HR: 40 INJECTION, POWDER, FOR SOLUTION INTRAVENOUS at 11:00

## 2018-04-28 RX ADMIN — MEROPENEM SCH GM: 1 INJECTION INTRAVENOUS at 14:30

## 2018-04-28 RX ADMIN — METOPROLOL TARTRATE PRN MG: 1 INJECTION, SOLUTION INTRAVENOUS at 21:14

## 2018-04-28 RX ADMIN — AMLODIPINE BESYLATE SCH MG: 10 TABLET ORAL at 09:00

## 2018-04-28 RX ADMIN — WARFARIN SODIUM SCH MG: 1 TABLET ORAL at 16:34

## 2018-04-28 RX ADMIN — PRAMIPEXOLE DIHYDROCHLORIDE SCH MG: 0.25 TABLET ORAL at 21:14

## 2018-04-28 RX ADMIN — METOPROLOL SUCCINATE SCH MG: 50 TABLET, EXTENDED RELEASE ORAL at 09:00

## 2018-04-28 RX ADMIN — PANTOPRAZOLE SODIUM SCH MLS/HR: 40 INJECTION, POWDER, FOR SOLUTION INTRAVENOUS at 15:00

## 2018-04-28 RX ADMIN — MEROPENEM SCH GM: 1 INJECTION INTRAVENOUS at 05:41

## 2018-04-28 RX ADMIN — FUROSEMIDE SCH MG: 20 TABLET ORAL at 09:00

## 2018-04-28 RX ADMIN — PROMETHAZINE HYDROCHLORIDE PRN MG: 25 INJECTION, SOLUTION INTRAMUSCULAR; INTRAVENOUS at 08:35

## 2018-04-28 RX ADMIN — PANTOPRAZOLE SODIUM SCH MLS/HR: 40 INJECTION, POWDER, FOR SOLUTION INTRAVENOUS at 21:14

## 2018-04-28 RX ADMIN — ASPIRIN 81 MG CHEWABLE TABLET SCH MG: 81 TABLET CHEWABLE at 09:00

## 2018-04-28 RX ADMIN — METOPROLOL TARTRATE PRN MG: 1 INJECTION, SOLUTION INTRAVENOUS at 16:09

## 2018-04-28 RX ADMIN — METOPROLOL SUCCINATE SCH MG: 50 TABLET, EXTENDED RELEASE ORAL at 16:36

## 2018-04-28 RX ADMIN — CALCITONIN SALMON SCH UNIT: 200 INJECTION, SOLUTION INTRAMUSCULAR; SUBCUTANEOUS at 11:00

## 2018-04-28 RX ADMIN — SODIUM CHLORIDE SCH MLS/HR: 9 INJECTION, SOLUTION INTRAVENOUS at 16:45

## 2018-04-28 RX ADMIN — MEGESTROL ACETATE SCH MG: 40 SUSPENSION ORAL at 09:00

## 2018-04-28 RX ADMIN — CALCITONIN SALMON SCH UNIT: 200 INJECTION, SOLUTION INTRAMUSCULAR; SUBCUTANEOUS at 21:13

## 2018-04-28 RX ADMIN — LEVOTHYROXINE SODIUM SCH MCG: 50 TABLET ORAL at 05:41

## 2018-04-28 RX ADMIN — ALLOPURINOL SCH MG: 100 TABLET ORAL at 09:00

## 2018-04-28 NOTE — DIAGNOSTIC IMAGING REPORT
EXAM: Abdomen, one view  



INDICATION:  Pain.



COMPARISON: None available.



FINDINGS:



LINES:  None.



Bowel: No air fluid levels..  No pneumoperitoneum..



Moderate amount of retained feces and air are noted in the colon and rectum.



No calcifications project over the renal shadows, expected course of the

ureters bilaterally, and bladder. Phleboliths are present in the pelvis.



Soft tissues:  Normal.



Bones: No acute osseous abnormality.  Degenerative changes of the lumbar spine

and pelvis. Severe scoliosis of the thoracolumbar spine.



Impression: 

No acute radiographic abnormality.



Signed by: Dr. Kev Soni M.D. on 4/28/2018 8:54 AM

## 2018-04-28 NOTE — PROGRESS NOTE
DATE:  April 28, 2018 



CARDIOLOGY PROGRESS NOTE  



SUBJECTIVE:  The patient denies chest pain or shortness of breath. She is 

complaining of nausea. 



OBJECTIVE  

VITAL SIGNS:  Temperature 99.2 degrees, pulse 97, respiratory rate 20, 

blood pressure 161/68, oxygen saturation 100% on 3 liters nasal cannula. 

GENERAL:  Elderly woman, cachectic, frail, no acute distress.

LUNGS:  Clear to auscultation bilaterally. No wheezes or crackles. 

CARDIOVASCULAR:  Normal rate, regular rhythm. No murmur. Normal S1 and S2. 

ABDOMEN:  Soft, nontender. 

EXTREMITIES:  No edema.  



CARDIAC MEDICATIONS

1. Warfarin 0.5 mg p.o. daily.

2. Metoprolol succinate 100 mg p.o. b.i.d. 

3. Amlodipine 10 mg p.o. daily.

4. Furosemide 20 mg p.o. daily.

5. Aspirin 81 mg p.o. daily.

6. Levothyroxine 50 mcg p.o. daily.



LABS:  WBC 12.39, hemoglobin 8.6, hematocrit 29.1, platelets 460,000.  

Sodium 139, potassium 4.4, chloride 102, CO2 of 29, BUN 27, creatinine 

0.78.  



TELEMETRY:  Normal sinus rhythm.



IMPRESSION

1. Chest pain.

2. Pseudomonas aeruginosa urinary tract infection.

3. Acute kidney injury on chronic kidney disease, improved.

4. Iron deficiency anemia.

5. Acute-on-chronic diastolic heart failure.

6. History of deep vein thrombosis/pulmonary embolism.

7. Reported history of atrial fibrillation.

8. Hypertension, uncontrolled.

9. Sinus tachycardia, resolved.



RECOMMENDATIONS:  The patient ruled out for myocardial infarction with 

serial cardiac biomarkers.  Continue current cardiac medications.  However, 

the patient refused her medications this morning.  Monitor the patient on 

telemetry.  Given the patient's frailty and recent acute kidney injury, she 

is not a candidate for invasive cardiac evaluation at this time.  Continue 

medical management.  The patient's INR is subtherapeutic.  However, as she 

is feeling nauseated we will not increase her warfarin at this time.  

Antibiotics per primary service.  



Thank you for this consult.  We will continue to follow. 







DD:  04/28/2018 14:49

DT:  04/28/2018 15:09

Job#:  [id number]

## 2018-04-29 VITALS — SYSTOLIC BLOOD PRESSURE: 160 MMHG | DIASTOLIC BLOOD PRESSURE: 70 MMHG

## 2018-04-29 VITALS — DIASTOLIC BLOOD PRESSURE: 78 MMHG | SYSTOLIC BLOOD PRESSURE: 182 MMHG

## 2018-04-29 VITALS — SYSTOLIC BLOOD PRESSURE: 160 MMHG | DIASTOLIC BLOOD PRESSURE: 67 MMHG

## 2018-04-29 VITALS — DIASTOLIC BLOOD PRESSURE: 74 MMHG | SYSTOLIC BLOOD PRESSURE: 173 MMHG

## 2018-04-29 VITALS — SYSTOLIC BLOOD PRESSURE: 169 MMHG | DIASTOLIC BLOOD PRESSURE: 74 MMHG

## 2018-04-29 VITALS — DIASTOLIC BLOOD PRESSURE: 75 MMHG | SYSTOLIC BLOOD PRESSURE: 173 MMHG

## 2018-04-29 RX ADMIN — PANTOPRAZOLE SODIUM SCH MLS/HR: 40 INJECTION, POWDER, FOR SOLUTION INTRAVENOUS at 11:00

## 2018-04-29 RX ADMIN — LEVOTHYROXINE SODIUM SCH MCG: 50 TABLET ORAL at 06:10

## 2018-04-29 RX ADMIN — PRAMIPEXOLE DIHYDROCHLORIDE SCH MG: 0.25 TABLET ORAL at 21:58

## 2018-04-29 RX ADMIN — PROMETHAZINE HYDROCHLORIDE PRN MG: 25 INJECTION, SOLUTION INTRAMUSCULAR; INTRAVENOUS at 06:56

## 2018-04-29 RX ADMIN — METOPROLOL TARTRATE PRN MG: 1 INJECTION, SOLUTION INTRAVENOUS at 21:58

## 2018-04-29 RX ADMIN — TAZOBACTAM SODIUM AND PIPERACILLIN SODIUM SCH MLS/HR: 375; 3 INJECTION, SOLUTION INTRAVENOUS at 08:40

## 2018-04-29 RX ADMIN — HYDROCODONE BITARTRATE AND ACETAMINOPHEN PRN EA: 5; 325 TABLET ORAL at 01:11

## 2018-04-29 RX ADMIN — AMLODIPINE BESYLATE SCH MG: 10 TABLET ORAL at 08:50

## 2018-04-29 RX ADMIN — ASPIRIN 81 MG CHEWABLE TABLET SCH MG: 81 TABLET CHEWABLE at 08:55

## 2018-04-29 RX ADMIN — TAZOBACTAM SODIUM AND PIPERACILLIN SODIUM SCH MLS/HR: 375; 3 INJECTION, SOLUTION INTRAVENOUS at 23:51

## 2018-04-29 RX ADMIN — TAZOBACTAM SODIUM AND PIPERACILLIN SODIUM SCH MLS/HR: 375; 3 INJECTION, SOLUTION INTRAVENOUS at 12:00

## 2018-04-29 RX ADMIN — PANTOPRAZOLE SODIUM SCH MLS/HR: 40 INJECTION, POWDER, FOR SOLUTION INTRAVENOUS at 06:10

## 2018-04-29 RX ADMIN — METOPROLOL TARTRATE PRN MG: 1 INJECTION, SOLUTION INTRAVENOUS at 06:11

## 2018-04-29 RX ADMIN — SODIUM CHLORIDE SCH MLS/HR: 9 INJECTION, SOLUTION INTRAVENOUS at 01:11

## 2018-04-29 RX ADMIN — PROMETHAZINE HYDROCHLORIDE PRN MG: 25 INJECTION, SOLUTION INTRAMUSCULAR; INTRAVENOUS at 21:59

## 2018-04-29 RX ADMIN — TAZOBACTAM SODIUM AND PIPERACILLIN SODIUM SCH MLS/HR: 375; 3 INJECTION, SOLUTION INTRAVENOUS at 16:52

## 2018-04-29 RX ADMIN — METOPROLOL SUCCINATE SCH MG: 50 TABLET, EXTENDED RELEASE ORAL at 16:10

## 2018-04-29 RX ADMIN — HYDROCODONE BITARTRATE AND ACETAMINOPHEN PRN EA: 5; 325 TABLET ORAL at 21:59

## 2018-04-29 RX ADMIN — PANTOPRAZOLE SODIUM SCH MLS/HR: 40 INJECTION, POWDER, FOR SOLUTION INTRAVENOUS at 22:38

## 2018-04-29 RX ADMIN — METOPROLOL TARTRATE PRN MG: 1 INJECTION, SOLUTION INTRAVENOUS at 12:34

## 2018-04-29 RX ADMIN — METOPROLOL SUCCINATE SCH MG: 50 TABLET, EXTENDED RELEASE ORAL at 08:50

## 2018-04-29 RX ADMIN — FUROSEMIDE SCH MG: 20 TABLET ORAL at 08:50

## 2018-04-29 RX ADMIN — ALLOPURINOL SCH MG: 100 TABLET ORAL at 08:15

## 2018-04-29 RX ADMIN — PANTOPRAZOLE SODIUM SCH MLS/HR: 40 INJECTION, POWDER, FOR SOLUTION INTRAVENOUS at 16:10

## 2018-04-29 RX ADMIN — PANTOPRAZOLE SODIUM SCH MLS/HR: 40 INJECTION, POWDER, FOR SOLUTION INTRAVENOUS at 01:11

## 2018-04-29 RX ADMIN — MEGESTROL ACETATE SCH MG: 40 SUSPENSION ORAL at 08:50

## 2018-04-29 NOTE — PROGRESS NOTE
DATE:  April 29, 2018 



CARDIOLOGY PROGRESS NOTE  



SUBJECTIVE:  The patient states she had a little chest pain overnight.  

Denies any shortness of breath.  She states her nausea is better.



OBJECTIVE  

VITAL SIGNS:  Temperature 97.2 degrees, pulse 107, respiratory rate 22, 

blood pressure 173/75, oxygen saturation 93% on 3 liters nasal cannula. 

GENERAL:  Elderly woman, cachectic, frail, no acute distress.

LUNGS:  Clear to auscultation bilaterally.  No wheezes or crackles. 

CARDIOVASCULAR:  Normal rate, regular rhythm.  No murmur.  Normal S1 and 

S2. 

ABDOMEN:  Soft, nontender. 

EXTREMITIES:  There is 1+ pitting edema of bilateral lower extremities.



CARDIAC MEDICATIONS

1. Metoprolol tartrate 5 mg IV q.6 h. 

2. Aspirin 81 mg p.o. daily.

3. Furosemide 20 mg p.o. daily.

4. Metoprolol succinate 100 mg p.o. b.i.d. 

5. Amlodipine 10 mg p.o. daily.

6. Levothyroxine 50 mcg p.o. daily.

7. Warfarin 0.5 mg p.o. daily.



LABS:  None today.



TELEMETRY:  Normal sinus rhythm.



IMPRESSION

1. Chest pain.

2. Pseudomonas aeruginosa urinary tract infection.

3. Acute kidney injury on chronic kidney disease, improved.

4. Iron deficiency anemia.

5. Acute-on-chronic diastolic heart failure.

6. History of deep vein thrombosis/pulmonary embolism.

7. Reported history of atrial fibrillation.

8. Hypertension, uncontrolled.

9. Sinus tachycardia, resolved.



RECOMMENDATIONS:  The patient ruled out for myocardial infarction with 

serial cardiac biomarkers.  Continue current cardiac medications.  Monitor 

the patient on telemetry.  Given the patient's frailty and recent acute 

kidney injury, she is not a candidate for invasive cardiac evaluation at 

this time.  Continue medical management.  The patient's INR is 

subtherapeutic.  However, as she is not eating well at this time, we will 

make no changes to her warfarin dose.  Antibiotics per primary service.  We 

will resume the patient's irbesartan as her blood pressure is not 

controlled and her renal function has improved.  



Thank you for this consult.  We will continue to follow. 









DD:  04/29/2018 15:25

DT:  04/29/2018 16:19

Job#:  [id number]

## 2018-04-30 VITALS — SYSTOLIC BLOOD PRESSURE: 154 MMHG | DIASTOLIC BLOOD PRESSURE: 68 MMHG

## 2018-04-30 VITALS — SYSTOLIC BLOOD PRESSURE: 159 MMHG | DIASTOLIC BLOOD PRESSURE: 72 MMHG

## 2018-04-30 VITALS — DIASTOLIC BLOOD PRESSURE: 68 MMHG | SYSTOLIC BLOOD PRESSURE: 154 MMHG

## 2018-04-30 VITALS — SYSTOLIC BLOOD PRESSURE: 162 MMHG | DIASTOLIC BLOOD PRESSURE: 73 MMHG

## 2018-04-30 VITALS — SYSTOLIC BLOOD PRESSURE: 126 MMHG | DIASTOLIC BLOOD PRESSURE: 58 MMHG

## 2018-04-30 VITALS — SYSTOLIC BLOOD PRESSURE: 147 MMHG | DIASTOLIC BLOOD PRESSURE: 65 MMHG

## 2018-04-30 VITALS — SYSTOLIC BLOOD PRESSURE: 164 MMHG | DIASTOLIC BLOOD PRESSURE: 77 MMHG

## 2018-04-30 VITALS — DIASTOLIC BLOOD PRESSURE: 67 MMHG | SYSTOLIC BLOOD PRESSURE: 162 MMHG

## 2018-04-30 LAB
ALPHA2 GLOB 24H UR ELPH-MCNC: 19.5 %
ANION GAP SERPL CALC-SCNC: 14.1 MMOL/L (ref 8–16)
ANISOCYTOSIS BLD QL SMEAR: (no result)
BASOPHILS # BLD AUTO: 0.1 10*3/UL (ref 0–0.1)
BASOPHILS NFR BLD AUTO: 0.5 % (ref 0–1)
BUN SERPL-MCNC: 22 MG/DL (ref 7–26)
BUN/CREAT SERPL: 27 (ref 6–25)
CALCIUM SERPL-MCNC: 8.8 MG/DL (ref 8.4–10.2)
CHLORIDE SERPL-SCNC: 97 MMOL/L (ref 98–107)
CO2 SERPL-SCNC: 29 MMOL/L (ref 22–29)
DEPRECATED INR PLAS: 1.27
DEPRECATED NEUTROPHILS # BLD AUTO: 11.2 10*3/UL (ref 2.1–6.9)
EGFRCR SERPLBLD CKD-EPI 2021: > 60 ML/MIN (ref 60–?)
EOSINOPHIL # BLD AUTO: 0 10*3/UL (ref 0–0.4)
EOSINOPHIL NFR BLD AUTO: 0.3 % (ref 0–6)
ERYTHROCYTE [DISTWIDTH] IN CORD BLOOD: 29.1 % (ref 11.7–14.4)
GLUCOSE SERPLBLD-MCNC: 73 MG/DL (ref 74–118)
HCT VFR BLD AUTO: 32.9 % (ref 34.2–44.1)
HGB BLD-MCNC: 10 G/DL (ref 12–16)
LYMPHOCYTES # BLD: 1.3 10*3/UL (ref 1–3.2)
LYMPHOCYTES NFR BLD AUTO: 9.6 % (ref 18–39.1)
MACROCYTES BLD QL SMEAR: (no result)
MCH RBC QN AUTO: 22.7 PG (ref 28–32)
MCHC RBC AUTO-ENTMCNC: 30.4 G/DL (ref 31–35)
MCV RBC AUTO: 74.6 FL (ref 81–99)
MONOCYTES # BLD AUTO: 0.8 10*3/UL (ref 0.2–0.8)
MONOCYTES NFR BLD AUTO: 5.6 % (ref 4.4–11.3)
NEUTS SEG NFR BLD AUTO: 83.2 % (ref 38.7–80)
PLAT MORPH BLD: (no result)
PLATELET # BLD AUTO: 445 X10E3/UL (ref 140–360)
PLATELET # BLD EST: (no result) 10*3/UL
POIKILOCYTOSIS BLD QL SMEAR: SLIGHT
POTASSIUM SERPL-SCNC: 3.1 MMOL/L (ref 3.5–5.1)
PROTHROMBIN TIME: 14.9 SECONDS (ref 11.9–14.5)
RBC # BLD AUTO: 4.41 X10E6/UL (ref 3.6–5.1)
RBC MORPH BLD: (no result)
SODIUM SERPL-SCNC: 137 MMOL/L (ref 136–145)
TARGETS BLD QL SMEAR: (no result)

## 2018-04-30 RX ADMIN — TAZOBACTAM SODIUM AND PIPERACILLIN SODIUM SCH MLS/HR: 375; 3 INJECTION, SOLUTION INTRAVENOUS at 05:44

## 2018-04-30 RX ADMIN — PANTOPRAZOLE SODIUM SCH MLS/HR: 40 INJECTION, POWDER, FOR SOLUTION INTRAVENOUS at 03:44

## 2018-04-30 RX ADMIN — PANTOPRAZOLE SODIUM SCH MLS/HR: 40 INJECTION, POWDER, FOR SOLUTION INTRAVENOUS at 22:03

## 2018-04-30 RX ADMIN — AMLODIPINE BESYLATE SCH MG: 10 TABLET ORAL at 09:40

## 2018-04-30 RX ADMIN — PROMETHAZINE HYDROCHLORIDE PRN MG: 25 INJECTION, SOLUTION INTRAMUSCULAR; INTRAVENOUS at 06:10

## 2018-04-30 RX ADMIN — METOPROLOL TARTRATE PRN MG: 1 INJECTION, SOLUTION INTRAVENOUS at 05:45

## 2018-04-30 RX ADMIN — METOPROLOL SUCCINATE SCH MG: 50 TABLET, EXTENDED RELEASE ORAL at 09:40

## 2018-04-30 RX ADMIN — METOPROLOL SUCCINATE SCH MG: 50 TABLET, EXTENDED RELEASE ORAL at 17:33

## 2018-04-30 RX ADMIN — TAZOBACTAM SODIUM AND PIPERACILLIN SODIUM SCH MLS/HR: 375; 3 INJECTION, SOLUTION INTRAVENOUS at 12:07

## 2018-04-30 RX ADMIN — TAZOBACTAM SODIUM AND PIPERACILLIN SODIUM SCH MLS/HR: 375; 3 INJECTION, SOLUTION INTRAVENOUS at 17:33

## 2018-04-30 RX ADMIN — FUROSEMIDE SCH MG: 20 TABLET ORAL at 09:40

## 2018-04-30 RX ADMIN — PRAMIPEXOLE DIHYDROCHLORIDE SCH MG: 0.25 TABLET ORAL at 20:59

## 2018-04-30 RX ADMIN — ALLOPURINOL SCH MG: 100 TABLET ORAL at 09:41

## 2018-04-30 RX ADMIN — PANTOPRAZOLE SODIUM SCH MLS/HR: 40 INJECTION, POWDER, FOR SOLUTION INTRAVENOUS at 17:32

## 2018-04-30 RX ADMIN — HYDROCODONE BITARTRATE AND ACETAMINOPHEN PRN EA: 5; 325 TABLET ORAL at 21:06

## 2018-04-30 RX ADMIN — MEGESTROL ACETATE SCH MG: 40 SUSPENSION ORAL at 09:40

## 2018-04-30 RX ADMIN — Medication PRN MG: at 05:45

## 2018-04-30 RX ADMIN — PANTOPRAZOLE SODIUM SCH MLS/HR: 40 INJECTION, POWDER, FOR SOLUTION INTRAVENOUS at 11:54

## 2018-04-30 RX ADMIN — LOSARTAN POTASSIUM SCH MG: 25 TABLET, FILM COATED ORAL at 09:40

## 2018-04-30 RX ADMIN — LEVOTHYROXINE SODIUM SCH MCG: 50 TABLET ORAL at 05:44

## 2018-04-30 RX ADMIN — ASPIRIN 81 MG CHEWABLE TABLET SCH MG: 81 TABLET CHEWABLE at 09:40

## 2018-04-30 RX ADMIN — PANTOPRAZOLE SODIUM SCH MLS/HR: 40 INJECTION, POWDER, FOR SOLUTION INTRAVENOUS at 09:40

## 2018-04-30 NOTE — PROGRESS NOTE
CARDIOLOGY PROGRESS NOTE



DATE:  April 30, 2018 



SUBJECTIVE:  The patient denies chest pain or shortness of breath.  She 

still is nauseated.



OBJECTIVE

VITAL SIGNS:  Temperature 98.4 degrees, pulse 97, respiratory rate 18, 

blood pressure 162/73.  Oxygen saturation 98% on 3 liters nasal cannula.

GENERAL:  Elderly woman, cachectic, frail.  No acute distress.

LUNGS:  Clear to auscultation bilaterally.  No wheezes or crackles.

CARDIOVASCULAR:  Normal rate, regular rhythm. No murmur.  Normal S1, S2.

ABDOMEN:  Soft, nontender.

EXTREMITIES:  1+ pitting edema bilateral lower extremities.



CARDIAC MEDICATIONS

1. Losartan 50 mg p.o. daily.

2. Furosemide 20 mg p.o. daily.

3. Metoprolol succinate 100 mg p.o. b.i.d.

4. Amlodipine 10 mg p.o. daily.

5. Aspirin 81 mg p.o. daily.



LABS:  WBC 13.44, hemoglobin 10, hematocrit 32.9, platelets 445.  Sodium 

137, potassium 3.1, chloride 97, CO2 29, BUN 20, creatinine 0.83.  INR 

1.27.



TELEMETRY:  Normal sinus rhythm.



IMPRESSION

1. Chest pain.

2. Pseudomonas aeruginosa urinary tract infection.

3. Acute kidney injury on chronic kidney disease, improved.

4. Iron deficiency anemia.

5. Acute-on-chronic diastolic heart failure.

6. Deep vein thrombosis/pulmonary embolus.

7. Reported history of atrial fibrillation.

8. Hypertension, uncontrolled.

9. Sinus tachycardia, resolved.



RECOMMENDATIONS:  The patient ruled out for myocardial infarction with 

serial cardiac biomarkers.  Continue current cardiac medications.  Monitor 

the patient on telemetry.  Given her frailty and recent acute kidney injury 

she is not a candidate for invasive cardiac evaluation at this time.  

Continue medical management.  Patient's INR remains subtherapeutic however 

as patient is not eating well, we will make no changes to her warfarin 

dose.  Antibiotics per primary service.  We will evaluate response to 

irbesartan.  She may need up-titration.



Thank you for this consult.  We will continue to follow.







DD:  04/30/2018 13:30

DT:  04/30/2018 13:48

Job#:  [id number]

## 2018-05-01 VITALS — DIASTOLIC BLOOD PRESSURE: 72 MMHG | SYSTOLIC BLOOD PRESSURE: 164 MMHG

## 2018-05-01 VITALS — SYSTOLIC BLOOD PRESSURE: 152 MMHG | DIASTOLIC BLOOD PRESSURE: 70 MMHG

## 2018-05-01 VITALS — SYSTOLIC BLOOD PRESSURE: 129 MMHG | DIASTOLIC BLOOD PRESSURE: 61 MMHG

## 2018-05-01 VITALS — SYSTOLIC BLOOD PRESSURE: 164 MMHG | DIASTOLIC BLOOD PRESSURE: 72 MMHG

## 2018-05-01 VITALS — DIASTOLIC BLOOD PRESSURE: 70 MMHG | SYSTOLIC BLOOD PRESSURE: 152 MMHG

## 2018-05-01 VITALS — SYSTOLIC BLOOD PRESSURE: 155 MMHG | DIASTOLIC BLOOD PRESSURE: 72 MMHG

## 2018-05-01 VITALS — SYSTOLIC BLOOD PRESSURE: 142 MMHG | DIASTOLIC BLOOD PRESSURE: 63 MMHG

## 2018-05-01 VITALS — SYSTOLIC BLOOD PRESSURE: 163 MMHG | DIASTOLIC BLOOD PRESSURE: 70 MMHG

## 2018-05-01 LAB
ALBUMIN SERPL-MCNC: 2.3 G/DL (ref 3.5–5)
ALBUMIN/GLOB SERPL: 0.7 {RATIO} (ref 0.8–2)
ALP SERPL-CCNC: 43 IU/L (ref 40–150)
ALT SERPL-CCNC: 8 IU/L (ref 0–55)
ANION GAP SERPL CALC-SCNC: 13.6 MMOL/L (ref 8–16)
ANISOCYTOSIS BLD QL SMEAR: (no result)
BASOPHILS # BLD AUTO: 0 10*3/UL (ref 0–0.1)
BASOPHILS NFR BLD AUTO: 0.3 % (ref 0–1)
BUN SERPL-MCNC: 25 MG/DL (ref 7–26)
BUN/CREAT SERPL: 30 (ref 6–25)
CALCIUM SERPL-MCNC: 8.7 MG/DL (ref 8.4–10.2)
CHLORIDE SERPL-SCNC: 98 MMOL/L (ref 98–107)
CO2 SERPL-SCNC: 28 MMOL/L (ref 22–29)
DEPRECATED NEUTROPHILS # BLD AUTO: 11.2 10*3/UL (ref 2.1–6.9)
EGFRCR SERPLBLD CKD-EPI 2021: > 60 ML/MIN (ref 60–?)
EOSINOPHIL # BLD AUTO: 0.1 10*3/UL (ref 0–0.4)
EOSINOPHIL NFR BLD AUTO: 0.7 % (ref 0–6)
ERYTHROCYTE [DISTWIDTH] IN CORD BLOOD: 29.1 % (ref 11.7–14.4)
GLOBULIN PLAS-MCNC: 3.1 G/DL (ref 2.3–3.5)
GLUCOSE SERPLBLD-MCNC: 74 MG/DL (ref 74–118)
HCT VFR BLD AUTO: 33.1 % (ref 34.2–44.1)
HGB BLD-MCNC: 10.1 G/DL (ref 12–16)
LYMPHOCYTES # BLD: 1.3 10*3/UL (ref 1–3.2)
LYMPHOCYTES NFR BLD AUTO: 9.3 % (ref 18–39.1)
LYMPHOCYTES NFR BLD MANUAL: 21 % (ref 19–48)
MCH RBC QN AUTO: 23.1 PG (ref 28–32)
MCHC RBC AUTO-ENTMCNC: 30.5 G/DL (ref 31–35)
MCV RBC AUTO: 75.7 FL (ref 81–99)
METAMYELOCYTES NFR BLD MANUAL: 1 % (ref 0–0)
MICROCYTES BLD QL SMEAR: (no result)
MONOCYTES # BLD AUTO: 0.9 10*3/UL (ref 0.2–0.8)
MONOCYTES NFR BLD AUTO: 6.8 % (ref 4.4–11.3)
MONOCYTES NFR BLD MANUAL: 2 % (ref 3.4–9)
NEUTS BAND NFR BLD MANUAL: 2 %
NEUTS SEG NFR BLD AUTO: 82.3 % (ref 38.7–80)
NEUTS SEG NFR BLD MANUAL: 73 % (ref 40–74)
PLAT MORPH BLD: (no result)
PLATELET # BLD AUTO: 408 X10E3/UL (ref 140–360)
PLATELET # BLD EST: ADEQUATE 10*3/UL
POIKILOCYTOSIS BLD QL SMEAR: SLIGHT
POTASSIUM SERPL-SCNC: 3.6 MMOL/L (ref 3.5–5.1)
RBC # BLD AUTO: 4.37 X10E6/UL (ref 3.6–5.1)
RBC MORPH BLD: (no result)
SMUDGE CELLS BLD QL SMEAR: (no result)
SODIUM SERPL-SCNC: 136 MMOL/L (ref 136–145)
TARGETS BLD QL SMEAR: (no result)

## 2018-05-01 RX ADMIN — TAZOBACTAM SODIUM AND PIPERACILLIN SODIUM SCH MLS/HR: 375; 3 INJECTION, SOLUTION INTRAVENOUS at 00:24

## 2018-05-01 RX ADMIN — MEGESTROL ACETATE SCH MG: 40 SUSPENSION ORAL at 08:05

## 2018-05-01 RX ADMIN — PRAMIPEXOLE DIHYDROCHLORIDE SCH MG: 0.25 TABLET ORAL at 21:11

## 2018-05-01 RX ADMIN — LEVOTHYROXINE SODIUM SCH MCG: 50 TABLET ORAL at 05:29

## 2018-05-01 RX ADMIN — HYDROCODONE BITARTRATE AND ACETAMINOPHEN PRN EA: 5; 325 TABLET ORAL at 21:11

## 2018-05-01 RX ADMIN — TAZOBACTAM SODIUM AND PIPERACILLIN SODIUM SCH MLS/HR: 375; 3 INJECTION, SOLUTION INTRAVENOUS at 17:26

## 2018-05-01 RX ADMIN — TAZOBACTAM SODIUM AND PIPERACILLIN SODIUM SCH MLS/HR: 375; 3 INJECTION, SOLUTION INTRAVENOUS at 12:27

## 2018-05-01 RX ADMIN — LOSARTAN POTASSIUM SCH MG: 25 TABLET, FILM COATED ORAL at 08:05

## 2018-05-01 RX ADMIN — ASPIRIN 81 MG CHEWABLE TABLET SCH MG: 81 TABLET CHEWABLE at 08:05

## 2018-05-01 RX ADMIN — PANTOPRAZOLE SODIUM SCH MLS/HR: 40 INJECTION, POWDER, FOR SOLUTION INTRAVENOUS at 08:25

## 2018-05-01 RX ADMIN — PANTOPRAZOLE SODIUM SCH MLS/HR: 40 INJECTION, POWDER, FOR SOLUTION INTRAVENOUS at 03:12

## 2018-05-01 RX ADMIN — ALLOPURINOL SCH MG: 100 TABLET ORAL at 08:05

## 2018-05-01 RX ADMIN — PANTOPRAZOLE SODIUM SCH MLS/HR: 40 INJECTION, POWDER, FOR SOLUTION INTRAVENOUS at 13:00

## 2018-05-01 RX ADMIN — AMLODIPINE BESYLATE SCH MG: 10 TABLET ORAL at 08:05

## 2018-05-01 RX ADMIN — FUROSEMIDE SCH MG: 20 TABLET ORAL at 08:05

## 2018-05-01 RX ADMIN — TAZOBACTAM SODIUM AND PIPERACILLIN SODIUM SCH MLS/HR: 375; 3 INJECTION, SOLUTION INTRAVENOUS at 05:28

## 2018-05-01 RX ADMIN — PANTOPRAZOLE SODIUM SCH MLS/HR: 40 INJECTION, POWDER, FOR SOLUTION INTRAVENOUS at 22:50

## 2018-05-01 RX ADMIN — METOPROLOL SUCCINATE SCH MG: 50 TABLET, EXTENDED RELEASE ORAL at 08:05

## 2018-05-01 RX ADMIN — PANTOPRAZOLE SODIUM SCH MLS/HR: 40 INJECTION, POWDER, FOR SOLUTION INTRAVENOUS at 17:19

## 2018-05-01 RX ADMIN — METOPROLOL SUCCINATE SCH MG: 50 TABLET, EXTENDED RELEASE ORAL at 17:26

## 2018-05-01 NOTE — PROGRESS NOTE
DATE:  May 01, 2018 



CARDIOLOGY PROGRESS NOTE



SUBJECTIVE:  The patient denies chest pain or shortness of breath.  She 

reports her nausea is better.



OBJECTIVE

VITAL SIGNS:  Temperature 97.8 degrees, pulse 81, respiratory rate 18, 

blood pressure 164/72. Oxygen saturation 100% on 3 liters nasal cannula.

GENERAL:  Elderly woman, cachectic, frail.  No acute distress.

LUNGS:  Clear to auscultation bilaterally.  No wheezes or crackles.

CARDIOVASCULAR:  Normal rate, regular rhythm.  No murmur.  Normal S1, S2.

ABDOMEN:  Soft, nontender.

EXTREMITIES:  No edema.



CARDIAC MEDICATIONS

1. Losartan 50 mg p.o. daily.

2. Furosemide 20 mg p.o. daily.

3. Metoprolol succinate 100 mg p.o. b.i.d.

4. Amlodipine 10 mg p.o. daily.

5. Aspirin 81 mg p.o. daily.

6. Levothyroxine 50 mcg p.o. daily.



LABS:  WBC 13.61, hemoglobin 10.1, hematocrit 33.1, platelets 408.  Sodium 

136, potassium 3.6, chloride 98, CO2 28, BUN 25, creatinine 0.84.  



TELEMETRY:  Normal sinus rhythm.



IMPRESSION

1. Chest pain.

2. Pseudomonas aeruginosa urinary tract infection.

3. Acute kidney injury on chronic kidney disease, improved.

4. Iron deficiency anemia with positive stool occult blood.

5. Acute-on-chronic diastolic heart failure.

6. History of deep vein thrombosis/pulmonary embolus.

7. Reported history of atrial fibrillation.

8. Hypertension, uncontrolled.

9. Sinus tachycardia, resolved.



RECOMMENDATIONS:  The patient ruled out for myocardial infarction with 

serial cardiac biomarkers.  Continue current cardiac medications.  Monitor 

the patient on telemetry.  Her blood pressure is above goal for age.  We 

will increase her ARB.  Antibiotics per primary service.  Monitor INR. 



Thank you for this consult.  We will continue to follow.







DD:  05/01/2018 10:26

DT:  05/01/2018 10:31

Job#:  [id number]

## 2018-05-02 VITALS — DIASTOLIC BLOOD PRESSURE: 56 MMHG | SYSTOLIC BLOOD PRESSURE: 111 MMHG

## 2018-05-02 VITALS — SYSTOLIC BLOOD PRESSURE: 110 MMHG | DIASTOLIC BLOOD PRESSURE: 55 MMHG

## 2018-05-02 VITALS — SYSTOLIC BLOOD PRESSURE: 103 MMHG | DIASTOLIC BLOOD PRESSURE: 53 MMHG

## 2018-05-02 VITALS — SYSTOLIC BLOOD PRESSURE: 146 MMHG | DIASTOLIC BLOOD PRESSURE: 65 MMHG

## 2018-05-02 VITALS — DIASTOLIC BLOOD PRESSURE: 65 MMHG | SYSTOLIC BLOOD PRESSURE: 146 MMHG

## 2018-05-02 VITALS — SYSTOLIC BLOOD PRESSURE: 104 MMHG | DIASTOLIC BLOOD PRESSURE: 51 MMHG

## 2018-05-02 VITALS — SYSTOLIC BLOOD PRESSURE: 158 MMHG | DIASTOLIC BLOOD PRESSURE: 67 MMHG

## 2018-05-02 LAB
ALBUMIN SERPL-MCNC: 2.2 G/DL (ref 3.5–5)
ALBUMIN/GLOB SERPL: 0.9 {RATIO} (ref 0.8–2)
ALP SERPL-CCNC: 41 IU/L (ref 40–150)
ALT SERPL-CCNC: 7 IU/L (ref 0–55)
ANION GAP SERPL CALC-SCNC: 13.3 MMOL/L (ref 8–16)
BUN SERPL-MCNC: 24 MG/DL (ref 7–26)
BUN/CREAT SERPL: 26 (ref 6–25)
CALCIUM SERPL-MCNC: 8.5 MG/DL (ref 8.4–10.2)
CHLORIDE SERPL-SCNC: 98 MMOL/L (ref 98–107)
CO2 SERPL-SCNC: 28 MMOL/L (ref 22–29)
DEPRECATED INR PLAS: 1.23
EGFRCR SERPLBLD CKD-EPI 2021: 57 ML/MIN (ref 60–?)
GLOBULIN PLAS-MCNC: 2.5 G/DL (ref 2.3–3.5)
GLUCOSE SERPLBLD-MCNC: 84 MG/DL (ref 74–118)
POTASSIUM SERPL-SCNC: 3.3 MMOL/L (ref 3.5–5.1)
PROTHROMBIN TIME: 14.6 SECONDS (ref 11.9–14.5)
SODIUM SERPL-SCNC: 136 MMOL/L (ref 136–145)

## 2018-05-02 RX ADMIN — TAZOBACTAM SODIUM AND PIPERACILLIN SODIUM SCH MLS/HR: 375; 3 INJECTION, SOLUTION INTRAVENOUS at 13:36

## 2018-05-02 RX ADMIN — TAZOBACTAM SODIUM AND PIPERACILLIN SODIUM SCH MLS/HR: 375; 3 INJECTION, SOLUTION INTRAVENOUS at 05:13

## 2018-05-02 RX ADMIN — METOPROLOL SUCCINATE SCH MG: 50 TABLET, EXTENDED RELEASE ORAL at 18:31

## 2018-05-02 RX ADMIN — PANTOPRAZOLE SODIUM SCH MLS/HR: 40 INJECTION, POWDER, FOR SOLUTION INTRAVENOUS at 03:44

## 2018-05-02 RX ADMIN — FUROSEMIDE SCH MG: 20 TABLET ORAL at 08:30

## 2018-05-02 RX ADMIN — AMLODIPINE BESYLATE SCH MG: 10 TABLET ORAL at 08:30

## 2018-05-02 RX ADMIN — PANTOPRAZOLE SODIUM SCH MLS/HR: 40 INJECTION, POWDER, FOR SOLUTION INTRAVENOUS at 13:36

## 2018-05-02 RX ADMIN — PANTOPRAZOLE SODIUM SCH MLS/HR: 40 INJECTION, POWDER, FOR SOLUTION INTRAVENOUS at 21:00

## 2018-05-02 RX ADMIN — HYDROCODONE BITARTRATE AND ACETAMINOPHEN PRN EA: 5; 325 TABLET ORAL at 21:05

## 2018-05-02 RX ADMIN — LEVOTHYROXINE SODIUM SCH MCG: 50 TABLET ORAL at 05:13

## 2018-05-02 RX ADMIN — LOSARTAN POTASSIUM SCH MG: 100 TABLET, FILM COATED ORAL at 08:30

## 2018-05-02 RX ADMIN — CHOLESTYRAMINE LIGHT SCH GM: 4 POWDER, FOR SUSPENSION ORAL at 21:00

## 2018-05-02 RX ADMIN — ASPIRIN 81 MG CHEWABLE TABLET SCH MG: 81 TABLET CHEWABLE at 08:30

## 2018-05-02 RX ADMIN — METOPROLOL SUCCINATE SCH MG: 50 TABLET, EXTENDED RELEASE ORAL at 08:30

## 2018-05-02 RX ADMIN — TAZOBACTAM SODIUM AND PIPERACILLIN SODIUM SCH MLS/HR: 375; 3 INJECTION, SOLUTION INTRAVENOUS at 18:31

## 2018-05-02 RX ADMIN — PRAMIPEXOLE DIHYDROCHLORIDE SCH MG: 0.25 TABLET ORAL at 21:00

## 2018-05-02 RX ADMIN — ALLOPURINOL SCH MG: 100 TABLET ORAL at 08:30

## 2018-05-02 RX ADMIN — PANTOPRAZOLE SODIUM SCH MLS/HR: 40 INJECTION, POWDER, FOR SOLUTION INTRAVENOUS at 08:30

## 2018-05-02 RX ADMIN — MEGESTROL ACETATE SCH MG: 40 SUSPENSION ORAL at 08:30

## 2018-05-02 RX ADMIN — TAZOBACTAM SODIUM AND PIPERACILLIN SODIUM SCH MLS/HR: 375; 3 INJECTION, SOLUTION INTRAVENOUS at 00:03

## 2018-05-02 NOTE — PROGRESS NOTE
DATE:  May 02, 2018 



CARDIOLOGY PROGRESS NOTE



SUBJECTIVE:  The patient denies chest pain or shortness of breath.  She 

indicates that her nausea has resolved.



OBJECTIVE 

VITALS:  Temperature 96.9 degrees, pulse 73, respiratory rate 20, blood 

pressure 146/65, oxygen saturation 99% on 3 L nasal cannula.

GENERAL:  Elderly cachectic, frail woman in no acute distress. 

LUNGS:  Clear to auscultation bilaterally.  No wheezes or crackles.

CARDIOVASCULAR:  Normal rate.  Regular rhythm.  No murmur.  Normal S1 and 

S2.

ABDOMEN:  Soft and nontender.

EXTREMITIES:  No edema.



CARDIAC MEDICATIONS 

1.  Losartan 100 mg p.o. daily.

2.  Furosemide 20 mg p.o. daily.

3.  Metoprolol succinate 100 mg p.o. b.i.d. 

4.  Amlodipine 10 mg p.o. daily.

5.  Aspirin 81 mg p.o. daily.

6.  Levothyroxine 50 mcg p.o. daily.



LABS:  Sodium 136, potassium 3.3, chloride 98, CO2 28, BUN 24, creatinine 

0.94.  Telemetry is normal sinus rhythm.  



IMPRESSION 

1.  Chest pain.

2.  Pseudomonas aeruginosa urinary tract infection.

3.  Acute kidney injury on chronic kidney disease, improved.

4.  Iron deficiency anemia with positive stool occult blood.

5.  Acute-on-chronic diastolic heart failure.

6.  History of deep venous thrombosis/pulmonary embolism.

7.  Reported history of atrial fibrillation.

8.  Hypertension, uncontrolled. 

9.  Sinus tachycardia, resolved.



RECOMMENDATIONS:  The patient ruled out for myocardial infarction with 

serial cardiac biomarkers.  Continue current cardiac medications.  Monitor 

the patient on telemetry.  Follow INR given the patient's variable 

nutritional status.  Result from today is pending.  Blood pressure has 

improved with increase in ARB.  We will monitor for now.  Antibiotics per 

primary service.  Monitor and replete electrolytes.



Thank you for this consult.  We will continue to follow. 









DD:  05/02/2018 09:51

DT:  05/02/2018 09:58

Job#:  [id number]

## 2018-05-03 VITALS — SYSTOLIC BLOOD PRESSURE: 139 MMHG | DIASTOLIC BLOOD PRESSURE: 65 MMHG

## 2018-05-03 VITALS — SYSTOLIC BLOOD PRESSURE: 98 MMHG | DIASTOLIC BLOOD PRESSURE: 48 MMHG

## 2018-05-03 VITALS — DIASTOLIC BLOOD PRESSURE: 58 MMHG | SYSTOLIC BLOOD PRESSURE: 121 MMHG

## 2018-05-03 VITALS — DIASTOLIC BLOOD PRESSURE: 53 MMHG | SYSTOLIC BLOOD PRESSURE: 118 MMHG

## 2018-05-03 VITALS — SYSTOLIC BLOOD PRESSURE: 123 MMHG | DIASTOLIC BLOOD PRESSURE: 58 MMHG

## 2018-05-03 VITALS — DIASTOLIC BLOOD PRESSURE: 65 MMHG | SYSTOLIC BLOOD PRESSURE: 139 MMHG

## 2018-05-03 LAB
ANION GAP SERPL CALC-SCNC: 10.6 MMOL/L (ref 8–16)
ANISOCYTOSIS BLD QL SMEAR: (no result)
BASOPHILS # BLD AUTO: 0 10*3/UL (ref 0–0.1)
BASOPHILS NFR BLD AUTO: 0.2 % (ref 0–1)
BUN SERPL-MCNC: 23 MG/DL (ref 7–26)
BUN/CREAT SERPL: 22 (ref 6–25)
CALCIUM SERPL-MCNC: 8.3 MG/DL (ref 8.4–10.2)
CHLORIDE SERPL-SCNC: 102 MMOL/L (ref 98–107)
CO2 SERPL-SCNC: 25 MMOL/L (ref 22–29)
DEPRECATED NEUTROPHILS # BLD AUTO: 7.5 10*3/UL (ref 2.1–6.9)
EGFRCR SERPLBLD CKD-EPI 2021: 51 ML/MIN (ref 60–?)
EOSINOPHIL # BLD AUTO: 0.2 10*3/UL (ref 0–0.4)
EOSINOPHIL NFR BLD AUTO: 1.5 % (ref 0–6)
ERYTHROCYTE [DISTWIDTH] IN CORD BLOOD: 29.1 % (ref 11.7–14.4)
GLUCOSE SERPLBLD-MCNC: 87 MG/DL (ref 74–118)
HCT VFR BLD AUTO: 27 % (ref 34.2–44.1)
HGB BLD-MCNC: 8.2 G/DL (ref 12–16)
HYPOCHROMIA BLD QL SMEAR: SLIGHT
LYMPHOCYTES # BLD: 1.3 10*3/UL (ref 1–3.2)
LYMPHOCYTES NFR BLD AUTO: 13.2 % (ref 18–39.1)
MACROCYTES BLD QL SMEAR: (no result)
MAGNESIUM SERPL-MCNC: 1.4 MG/DL (ref 1.3–2.1)
MCH RBC QN AUTO: 23.3 PG (ref 28–32)
MCHC RBC AUTO-ENTMCNC: 30.4 G/DL (ref 31–35)
MCV RBC AUTO: 76.7 FL (ref 81–99)
MONOCYTES # BLD AUTO: 1 10*3/UL (ref 0.2–0.8)
MONOCYTES NFR BLD AUTO: 10.1 % (ref 4.4–11.3)
NEUTS SEG NFR BLD AUTO: 74.3 % (ref 38.7–80)
PLAT MORPH BLD: NORMAL
PLATELET # BLD AUTO: 303 X10E3/UL (ref 140–360)
PLATELET # BLD EST: ADEQUATE 10*3/UL
POTASSIUM SERPL-SCNC: 3.6 MMOL/L (ref 3.5–5.1)
RBC # BLD AUTO: 3.52 X10E6/UL (ref 3.6–5.1)
RBC MORPH BLD: (no result)
SODIUM SERPL-SCNC: 134 MMOL/L (ref 136–145)

## 2018-05-03 RX ADMIN — PANTOPRAZOLE SODIUM SCH MLS/HR: 40 INJECTION, POWDER, FOR SOLUTION INTRAVENOUS at 18:57

## 2018-05-03 RX ADMIN — CHOLESTYRAMINE LIGHT SCH GM: 4 POWDER, FOR SUSPENSION ORAL at 07:46

## 2018-05-03 RX ADMIN — ASPIRIN 81 MG CHEWABLE TABLET SCH MG: 81 TABLET CHEWABLE at 07:46

## 2018-05-03 RX ADMIN — AMLODIPINE BESYLATE SCH MG: 10 TABLET ORAL at 07:46

## 2018-05-03 RX ADMIN — LEVOTHYROXINE SODIUM SCH MCG: 50 TABLET ORAL at 06:14

## 2018-05-03 RX ADMIN — LOSARTAN POTASSIUM SCH MG: 100 TABLET, FILM COATED ORAL at 07:46

## 2018-05-03 RX ADMIN — PANTOPRAZOLE SODIUM SCH MLS/HR: 40 INJECTION, POWDER, FOR SOLUTION INTRAVENOUS at 00:45

## 2018-05-03 RX ADMIN — FUROSEMIDE SCH MG: 20 TABLET ORAL at 07:46

## 2018-05-03 RX ADMIN — PANTOPRAZOLE SODIUM SCH MLS/HR: 40 INJECTION, POWDER, FOR SOLUTION INTRAVENOUS at 10:00

## 2018-05-03 RX ADMIN — CHOLESTYRAMINE LIGHT SCH GM: 4 POWDER, FOR SUSPENSION ORAL at 17:36

## 2018-05-03 RX ADMIN — PANTOPRAZOLE SODIUM SCH MLS/HR: 40 INJECTION, POWDER, FOR SOLUTION INTRAVENOUS at 23:58

## 2018-05-03 RX ADMIN — ALLOPURINOL SCH MG: 100 TABLET ORAL at 07:46

## 2018-05-03 RX ADMIN — TAZOBACTAM SODIUM AND PIPERACILLIN SODIUM SCH MLS/HR: 375; 3 INJECTION, SOLUTION INTRAVENOUS at 17:36

## 2018-05-03 RX ADMIN — HYDROCODONE BITARTRATE AND ACETAMINOPHEN PRN EA: 5; 325 TABLET ORAL at 21:30

## 2018-05-03 RX ADMIN — MEGESTROL ACETATE SCH MG: 40 SUSPENSION ORAL at 07:46

## 2018-05-03 RX ADMIN — PANTOPRAZOLE SODIUM SCH MLS/HR: 40 INJECTION, POWDER, FOR SOLUTION INTRAVENOUS at 07:45

## 2018-05-03 RX ADMIN — TAZOBACTAM SODIUM AND PIPERACILLIN SODIUM SCH MLS/HR: 375; 3 INJECTION, SOLUTION INTRAVENOUS at 12:00

## 2018-05-03 RX ADMIN — METOPROLOL SUCCINATE SCH MG: 50 TABLET, EXTENDED RELEASE ORAL at 07:46

## 2018-05-03 RX ADMIN — TAZOBACTAM SODIUM AND PIPERACILLIN SODIUM SCH MLS/HR: 375; 3 INJECTION, SOLUTION INTRAVENOUS at 00:50

## 2018-05-03 RX ADMIN — METOPROLOL SUCCINATE SCH MG: 50 TABLET, EXTENDED RELEASE ORAL at 17:00

## 2018-05-03 RX ADMIN — PRAMIPEXOLE DIHYDROCHLORIDE SCH MG: 0.25 TABLET ORAL at 21:30

## 2018-05-03 RX ADMIN — TAZOBACTAM SODIUM AND PIPERACILLIN SODIUM SCH MLS/HR: 375; 3 INJECTION, SOLUTION INTRAVENOUS at 06:14

## 2018-05-03 NOTE — PROGRESS NOTE
DATE:  May 03, 2018 



CARDIOLOGY PROGRESS NOTE



SUBJECTIVE:  The patient denies chest pain or shortness of breath.  



OBJECTIVE 

VITALS:  Temperature 97.5 degrees, pulse 71, respiratory rate 18, blood 

pressure 139/65, oxygen saturation 99% on room air. 

GENERAL:  Elderly woman, cachectic, frail, in no acute distress. 

LUNGS:  Clear to auscultation bilaterally.  No wheezes or crackles.

CARDIOVASCULAR:  Normal rate.  Regular rhythm.  No murmur.  Normal S1 and 

S2.

ABDOMEN:  Soft and nontender.

EXTREMITIES:  No edema.



CARDIAC MEDICATIONS 

1. Losartan 100 mg p.o. daily.

2. Furosemide 20 mg p.o. daily.

3. Metoprolol succinate 100 mg p.o. b.i.d. 

4. Amlodipine 10 mg p.o. daily.

5. Aspirin 81 mg p.o. daily.



LABS:  WBC 10.14, hemoglobin 8.2, hematocrit 27, platelets 303.  Sodium 

134, potassium 3.6, chloride 102, CO2 25, BUN 23, creatinine 1.03.  



TELEMETRY:  Normal sinus rhythm.  



IMPRESSION 

1. Acute anemia, suspect gastrointestinal bleeding given positive stool 

occult blood. 

2. Pseudomonas aeruginosa urinary tract infection.

3. Acute kidney injury on chronic kidney disease, improved.

4. Iron deficiency anemia.

5. Acute-on-chronic diastolic heart failure.

6. Chest pain.

7. History of deep venous thrombosis/pulmonary embolism.

8. Reported history of atrial fibrillation.

9. Hypertension, improved. 

10. Sinus tachycardia, resolved.



RECOMMENDATIONS:  The patient ruled out for myocardial infarction with 

serial cardiac biomarkers.  Continue current cardiac medications.  Monitor 

the patient on telemetry.  Warfarin has been discontinued due to the 

patient's anemia.  GI consult noted.  She may proceed with EGD without 

further cardiac evaluation.  Antibiotics per primary service. Monitor and 

replete electrolytes.



Thank you for this consult.  We will continue to follow. 







DD:  05/03/2018 10:51

DT:  05/03/2018 11:09

Job#:  [id number]

## 2018-05-04 VITALS — SYSTOLIC BLOOD PRESSURE: 134 MMHG | DIASTOLIC BLOOD PRESSURE: 78 MMHG

## 2018-05-04 VITALS — DIASTOLIC BLOOD PRESSURE: 56 MMHG | SYSTOLIC BLOOD PRESSURE: 114 MMHG

## 2018-05-04 VITALS — DIASTOLIC BLOOD PRESSURE: 51 MMHG | SYSTOLIC BLOOD PRESSURE: 115 MMHG

## 2018-05-04 VITALS — DIASTOLIC BLOOD PRESSURE: 68 MMHG | SYSTOLIC BLOOD PRESSURE: 152 MMHG

## 2018-05-04 VITALS — SYSTOLIC BLOOD PRESSURE: 114 MMHG | DIASTOLIC BLOOD PRESSURE: 56 MMHG

## 2018-05-04 VITALS — SYSTOLIC BLOOD PRESSURE: 137 MMHG | DIASTOLIC BLOOD PRESSURE: 64 MMHG

## 2018-05-04 VITALS — SYSTOLIC BLOOD PRESSURE: 132 MMHG | DIASTOLIC BLOOD PRESSURE: 63 MMHG

## 2018-05-04 VITALS — DIASTOLIC BLOOD PRESSURE: 55 MMHG | SYSTOLIC BLOOD PRESSURE: 119 MMHG

## 2018-05-04 LAB
ANION GAP SERPL CALC-SCNC: 9.2 MMOL/L (ref 8–16)
ANISOCYTOSIS BLD QL SMEAR: (no result)
BASOPHILS # BLD AUTO: 0 10*3/UL (ref 0–0.1)
BASOPHILS NFR BLD AUTO: 0.3 % (ref 0–1)
BUN SERPL-MCNC: 25 MG/DL (ref 7–26)
BUN/CREAT SERPL: 21 (ref 6–25)
CALCIUM SERPL-MCNC: 8.2 MG/DL (ref 8.4–10.2)
CHLORIDE SERPL-SCNC: 105 MMOL/L (ref 98–107)
CO2 SERPL-SCNC: 25 MMOL/L (ref 22–29)
DEPRECATED NEUTROPHILS # BLD AUTO: 7.9 10*3/UL (ref 2.1–6.9)
EGFRCR SERPLBLD CKD-EPI 2021: 44 ML/MIN (ref 60–?)
ELLIPTOCYTES BLD QL SMEAR: SLIGHT
EOSINOPHIL # BLD AUTO: 0.3 10*3/UL (ref 0–0.4)
EOSINOPHIL NFR BLD AUTO: 2.4 % (ref 0–6)
ERYTHROCYTE [DISTWIDTH] IN CORD BLOOD: 30 % (ref 11.7–14.4)
GLUCOSE SERPLBLD-MCNC: 80 MG/DL (ref 74–118)
HCT VFR BLD AUTO: 28.8 % (ref 34.2–44.1)
HGB BLD-MCNC: 8.4 G/DL (ref 12–16)
HYPOCHROMIA BLD QL SMEAR: (no result)
LYMPHOCYTES # BLD: 1.4 10*3/UL (ref 1–3.2)
LYMPHOCYTES NFR BLD AUTO: 13.2 % (ref 18–39.1)
MCH RBC QN AUTO: 23.1 PG (ref 28–32)
MCHC RBC AUTO-ENTMCNC: 29.2 G/DL (ref 31–35)
MCV RBC AUTO: 79.3 FL (ref 81–99)
MICROCYTES BLD QL SMEAR: (no result)
MONOCYTES # BLD AUTO: 1.1 10*3/UL (ref 0.2–0.8)
MONOCYTES NFR BLD AUTO: 9.8 % (ref 4.4–11.3)
NEUTS SEG NFR BLD AUTO: 74 % (ref 38.7–80)
PLAT MORPH BLD: NORMAL
PLATELET # BLD AUTO: 321 X10E3/UL (ref 140–360)
PLATELET # BLD EST: ADEQUATE 10*3/UL
POTASSIUM SERPL-SCNC: 4.2 MMOL/L (ref 3.5–5.1)
RBC # BLD AUTO: 3.63 X10E6/UL (ref 3.6–5.1)
RBC MORPH BLD: (no result)
SODIUM SERPL-SCNC: 135 MMOL/L (ref 136–145)
TARGETS BLD QL SMEAR: (no result)

## 2018-05-04 RX ADMIN — PANTOPRAZOLE SODIUM SCH MLS/HR: 40 INJECTION, POWDER, FOR SOLUTION INTRAVENOUS at 04:26

## 2018-05-04 RX ADMIN — ASPIRIN 81 MG CHEWABLE TABLET SCH MG: 81 TABLET CHEWABLE at 09:00

## 2018-05-04 RX ADMIN — TAZOBACTAM SODIUM AND PIPERACILLIN SODIUM SCH MLS/HR: 375; 3 INJECTION, SOLUTION INTRAVENOUS at 00:45

## 2018-05-04 RX ADMIN — CHOLESTYRAMINE LIGHT SCH GM: 4 POWDER, FOR SUSPENSION ORAL at 17:45

## 2018-05-04 RX ADMIN — PANTOPRAZOLE SODIUM SCH MLS/HR: 40 INJECTION, POWDER, FOR SOLUTION INTRAVENOUS at 19:03

## 2018-05-04 RX ADMIN — PRAMIPEXOLE DIHYDROCHLORIDE SCH MG: 0.25 TABLET ORAL at 20:14

## 2018-05-04 RX ADMIN — PANTOPRAZOLE SODIUM SCH MLS/HR: 40 INJECTION, POWDER, FOR SOLUTION INTRAVENOUS at 20:21

## 2018-05-04 RX ADMIN — HYDROCODONE BITARTRATE AND ACETAMINOPHEN PRN EA: 5; 325 TABLET ORAL at 20:20

## 2018-05-04 RX ADMIN — LEVOTHYROXINE SODIUM SCH MCG: 50 TABLET ORAL at 06:24

## 2018-05-04 RX ADMIN — METOPROLOL SUCCINATE SCH MG: 50 TABLET, EXTENDED RELEASE ORAL at 17:45

## 2018-05-04 RX ADMIN — AMLODIPINE BESYLATE SCH MG: 10 TABLET ORAL at 09:00

## 2018-05-04 RX ADMIN — FUROSEMIDE SCH MG: 20 TABLET ORAL at 09:00

## 2018-05-04 RX ADMIN — CHOLESTYRAMINE LIGHT SCH GM: 4 POWDER, FOR SUSPENSION ORAL at 09:00

## 2018-05-04 RX ADMIN — MEGESTROL ACETATE SCH MG: 40 SUSPENSION ORAL at 09:00

## 2018-05-04 RX ADMIN — LOSARTAN POTASSIUM SCH MG: 100 TABLET, FILM COATED ORAL at 09:00

## 2018-05-04 RX ADMIN — ALLOPURINOL SCH MG: 100 TABLET ORAL at 09:00

## 2018-05-04 RX ADMIN — PANTOPRAZOLE SODIUM SCH MLS/HR: 40 INJECTION, POWDER, FOR SOLUTION INTRAVENOUS at 11:00

## 2018-05-04 RX ADMIN — METOPROLOL SUCCINATE SCH MG: 50 TABLET, EXTENDED RELEASE ORAL at 09:00

## 2018-05-04 NOTE — PROGRESS NOTE
DATE:  May 04, 2018 



CARDIOLOGY PROGRESS NOTE



SUBJECTIVE:  The patient denies chest pain or shortness of breath. She 

reports she was eating well yesterday before she became n.p.o. for EGD 

today.



OBJECTIVE

VITAL SIGNS:  Temperature 97.6 degrees, pulse 88, respiratory rate 18, 

blood pressure 134/78, oxygen saturation 100% on room air.

GENERAL:  Cachectic, frail, elderly woman in no acute distress.

LUNGS:  Clear to auscultation bilaterally. No wheezes or crackles. 

CARDIOVASCULAR:  Normal rate, regular rhythm. No murmur. Normal S1 and S2. 

ABDOMEN:  Soft, nontender. 

EXTREMITIES:  No edema. 



CARDIAC MEDICATIONS

1. Losartan 100 mg p.o. daily.

2. Lasix 20 mg p.o. daily.

3. Metoprolol succinate 100 mg p.o. b.i.d. 

4. Amlodipine 10 mg p.o. daily.

5. Aspirin 81 mg p.o. daily.

6. Levothyroxine 50 mcg p.o. daily.



LABS:  WBC 10.67, hemoglobin 8.4, hematocrit 28.8, platelets 321. Sodium 

135, potassium 4.2, chloride 105, CO2 25, BUN 25, creatinine 1.17. 



IMPRESSION

1. Acute anemia, suspect gastrointestinal bleeding given a positive 

stool occult blood.

2. Pseudomonas aeruginosa urinary tract infection.

3. Acute kidney injury on chronic kidney disease, improved.

4. Iron deficiency anemia.

5. Acute-on-chronic diastolic heart failure.

6. Chest pain.

7. History of deep vein thrombosis/pulmonary embolism.

8. History of atrial fibrillation.

9. Hypertension, improved.

10. Sinus tachycardia, resolved.



RECOMMENDATIONS:  The patient ruled out for a myocardial infarction with 

serial cardiac biomarkers. Her blood pressure is acceptable for age. 

Continue current cardiac medications. Monitor patient on telemetry. 

Warfarin is currently on hold given acute anemia. We will discuss resuming 

after EGD and GI evaluation has been completed. Antibiotics per primary 

service.



Thank you for this consult. We will continue to follow.













DD:  05/04/2018 17:14

DT:  05/04/2018 17:35

Job#:  [id number]

## 2018-05-05 VITALS — DIASTOLIC BLOOD PRESSURE: 62 MMHG | SYSTOLIC BLOOD PRESSURE: 137 MMHG

## 2018-05-05 VITALS — DIASTOLIC BLOOD PRESSURE: 59 MMHG | SYSTOLIC BLOOD PRESSURE: 129 MMHG

## 2018-05-05 VITALS — DIASTOLIC BLOOD PRESSURE: 65 MMHG | SYSTOLIC BLOOD PRESSURE: 138 MMHG

## 2018-05-05 VITALS — SYSTOLIC BLOOD PRESSURE: 124 MMHG | DIASTOLIC BLOOD PRESSURE: 59 MMHG

## 2018-05-05 VITALS — SYSTOLIC BLOOD PRESSURE: 142 MMHG | DIASTOLIC BLOOD PRESSURE: 63 MMHG

## 2018-05-05 LAB
ANION GAP SERPL CALC-SCNC: 9.2 MMOL/L (ref 8–16)
ANISOCYTOSIS BLD QL SMEAR: (no result)
BASOPHILS # BLD AUTO: 0 10*3/UL (ref 0–0.1)
BASOPHILS NFR BLD AUTO: 0.4 % (ref 0–1)
BUN SERPL-MCNC: 19 MG/DL (ref 7–26)
BUN/CREAT SERPL: 20 (ref 6–25)
CALCIUM SERPL-MCNC: 8.6 MG/DL (ref 8.4–10.2)
CHLORIDE SERPL-SCNC: 106 MMOL/L (ref 98–107)
CO2 SERPL-SCNC: 25 MMOL/L (ref 22–29)
DEPRECATED NEUTROPHILS # BLD AUTO: 8.3 10*3/UL (ref 2.1–6.9)
EGFRCR SERPLBLD CKD-EPI 2021: 55 ML/MIN (ref 60–?)
EOSINOPHIL # BLD AUTO: 0.3 10*3/UL (ref 0–0.4)
EOSINOPHIL NFR BLD AUTO: 2.5 % (ref 0–6)
EOSINOPHIL NFR BLD MANUAL: 1 % (ref 0–7)
ERYTHROCYTE [DISTWIDTH] IN CORD BLOOD: 30.2 % (ref 11.7–14.4)
GLUCOSE SERPLBLD-MCNC: 77 MG/DL (ref 74–118)
HCT VFR BLD AUTO: 30.2 % (ref 34.2–44.1)
HGB BLD-MCNC: 9 G/DL (ref 12–16)
HYPOCHROMIA BLD QL SMEAR: (no result)
LYMPHOCYTES # BLD: 1.2 10*3/UL (ref 1–3.2)
LYMPHOCYTES NFR BLD AUTO: 11 % (ref 18–39.1)
LYMPHOCYTES NFR BLD MANUAL: 16 % (ref 19–48)
MCH RBC QN AUTO: 23.7 PG (ref 28–32)
MCHC RBC AUTO-ENTMCNC: 29.8 G/DL (ref 31–35)
MCV RBC AUTO: 79.7 FL (ref 81–99)
MONOCYTES # BLD AUTO: 1 10*3/UL (ref 0.2–0.8)
MONOCYTES NFR BLD AUTO: 8.9 % (ref 4.4–11.3)
NEUTS BAND NFR BLD MANUAL: 1 %
NEUTS SEG NFR BLD AUTO: 76.7 % (ref 38.7–80)
NEUTS SEG NFR BLD MANUAL: 82 % (ref 40–74)
PLAT MORPH BLD: (no result)
PLATELET # BLD AUTO: 325 X10E3/UL (ref 140–360)
PLATELET # BLD EST: ADEQUATE 10*3/UL
POIKILOCYTOSIS BLD QL SMEAR: (no result)
POTASSIUM SERPL-SCNC: 4.2 MMOL/L (ref 3.5–5.1)
RBC # BLD AUTO: 3.79 X10E6/UL (ref 3.6–5.1)
SODIUM SERPL-SCNC: 136 MMOL/L (ref 136–145)

## 2018-05-05 RX ADMIN — PRAMIPEXOLE DIHYDROCHLORIDE SCH MG: 0.25 TABLET ORAL at 21:47

## 2018-05-05 RX ADMIN — HYDROCODONE BITARTRATE AND ACETAMINOPHEN PRN EA: 5; 325 TABLET ORAL at 21:47

## 2018-05-05 RX ADMIN — LOSARTAN POTASSIUM SCH MG: 100 TABLET, FILM COATED ORAL at 09:00

## 2018-05-05 RX ADMIN — MEGESTROL ACETATE SCH MG: 40 SUSPENSION ORAL at 09:01

## 2018-05-05 RX ADMIN — FUROSEMIDE SCH MG: 20 TABLET ORAL at 09:01

## 2018-05-05 RX ADMIN — AMLODIPINE BESYLATE SCH MG: 10 TABLET ORAL at 09:01

## 2018-05-05 RX ADMIN — PANTOPRAZOLE SODIUM SCH MLS/HR: 40 INJECTION, POWDER, FOR SOLUTION INTRAVENOUS at 07:25

## 2018-05-05 RX ADMIN — METOPROLOL SUCCINATE SCH MG: 50 TABLET, EXTENDED RELEASE ORAL at 09:01

## 2018-05-05 RX ADMIN — PANTOPRAZOLE SODIUM SCH MLS/HR: 40 INJECTION, POWDER, FOR SOLUTION INTRAVENOUS at 01:16

## 2018-05-05 RX ADMIN — LEVOTHYROXINE SODIUM SCH MCG: 50 TABLET ORAL at 07:17

## 2018-05-05 RX ADMIN — CHOLESTYRAMINE LIGHT SCH GM: 4 POWDER, FOR SUSPENSION ORAL at 17:14

## 2018-05-05 RX ADMIN — METOPROLOL SUCCINATE SCH MG: 50 TABLET, EXTENDED RELEASE ORAL at 17:00

## 2018-05-05 RX ADMIN — ASPIRIN 81 MG CHEWABLE TABLET SCH MG: 81 TABLET CHEWABLE at 09:00

## 2018-05-05 RX ADMIN — CHOLESTYRAMINE LIGHT SCH GM: 4 POWDER, FOR SUSPENSION ORAL at 09:01

## 2018-05-05 RX ADMIN — ALLOPURINOL SCH MG: 100 TABLET ORAL at 09:01

## 2018-05-05 NOTE — PROGRESS NOTE
DATE:    



SUBJECTIVE:  Patient is doing a little bit better.  EGD was noted.  Feels 

like her appetite is better. 



OBJECTIVE  

VITAL SIGNS:  Stable.  She is afebrile. 

GENERAL:  No apparent distress. 

CARDIOVASCULAR:  Regular rate and rhythm. 

LUNGS:  Clear to auscultation bilaterally. 

ABDOMEN:  Good bowel sounds, soft, nontender. 

EXTREMITIES:  No clubbing, cyanosis.  

NEUROLOGIC:  Nonfocal. 



ASSESSMENT AND PLAN  

1. Gastrointestinal bleed.  Continue with workup per gastrointestinal.

2. Anemia.  Continue to monitor.

3. Anorexia.  Continue with Megace and encourage p.o. intake. 

4. Urinary tract infection.  She has finished the antibiotics, so this 

has been discontinued. 



Please see hospital chart for full details.











DD:  05/05/2018 22:33

DT:  05/05/2018 23:15

Job#:  [id number]

## 2018-05-05 NOTE — PROGRESS NOTE
DATE:  May 05, 2018 



CARDIOLOGY PROGRESS NOTE



SUBJECTIVE:  Patient denies any shortness of breath, chest pain or 

palpitations.  Status post EGD yesterday and feeling well. 



OBJECTIVE   

VITAL SIGNS:  Temperature 98.2, pulse 82, respiratory rate 18.  Blood 

pressure 137/62.  Oxygen saturation 100% on 2 liters nasal cannula.  



CARDIOVASCULAR MEDICATIONS 

1. Lasix 20 mg p.o. daily.  

2. Metoprolol 100 p.o. b.i.d. 

3. Amlodipine 10 p.o. daily.

4. Losartan 100 p.o. daily.  

5. Aspirin 81 p.o. daily.  

6. Levothyroxine 50 mcg p.o. daily.  



LABS:  WBC count 10.79, hemoglobin 9.0, hematocrit 30.2, platelets 325.  

Sodium 136, potassium 4.2, BUN 19, creatinine 0.97.  Calcium 8.6.



TELEMETRY:  Sinus rhythm. 



PHYSICAL EXAMINATION 

GENERAL:  Alert and oriented times 3, resting comfortably in bed, does not 

appear to be in any acute distress. 

CARDIOVASCULAR:  Regular rate and rhythm, normal S1 and S2.  No S3 or S4 

auscultated.  No rubs.  No gallops.  

LUNGS:  Clear to auscultation throughout.  No wheezing.  No rhonchi.  No 

crackles. 

ABDOMEN:  Soft, nontender.  

LOWER EXTREMITIES:  No edema. 



IMPRESSION 

1. Acute anemia, suspect gastrointestinal bleed.  However, candida 

esophagitis and gastritis noted on esophagogastroduodenoscopy.  Positive 

stool occult blood.  

2. Pseudomonas aeruginosa urinary tract infection.

3. Acute kidney injury on chronic kidney disease, improved.  

4. Iron deficiency anemia. 

5. Acute-on-chronic diastolic heart failure.

6. Chest pain, resolved.

7. History of deep vein thrombosis and pulmonary embolism.

8. History of atrial fibrillation.

9. Hypertension.



RECOMMENDATION:  Patient has been ruled out for myocardial infarction with 

serial cardiac markers.  Chest pain has resolved.  Continue to monitor and 

maintain on tele.  Continue to monitor blood pressure closely.  Patient 

acceptable for age at this time.  Warfarin is currently on hold given acute 

anemia and also requiring procedures.  Patient reports plan for colonoscopy 

coming up soon.  Continue antibiotics per primary service.  Protonix 

initiated due to gastritis.  Will continue to follow the patient very 

closely. 



Dictated by Nahomi Villalta NP. 







DD:  05/05/2018 10:52

DT:  05/05/2018 12:35

Job#:  [id number]

## 2018-05-06 VITALS — DIASTOLIC BLOOD PRESSURE: 61 MMHG | SYSTOLIC BLOOD PRESSURE: 137 MMHG

## 2018-05-06 VITALS — SYSTOLIC BLOOD PRESSURE: 151 MMHG | DIASTOLIC BLOOD PRESSURE: 67 MMHG

## 2018-05-06 VITALS — DIASTOLIC BLOOD PRESSURE: 58 MMHG | SYSTOLIC BLOOD PRESSURE: 122 MMHG

## 2018-05-06 VITALS — DIASTOLIC BLOOD PRESSURE: 68 MMHG | SYSTOLIC BLOOD PRESSURE: 163 MMHG

## 2018-05-06 VITALS — DIASTOLIC BLOOD PRESSURE: 60 MMHG | SYSTOLIC BLOOD PRESSURE: 130 MMHG

## 2018-05-06 VITALS — DIASTOLIC BLOOD PRESSURE: 63 MMHG | SYSTOLIC BLOOD PRESSURE: 135 MMHG

## 2018-05-06 LAB
ANION GAP SERPL CALC-SCNC: 10.2 MMOL/L (ref 8–16)
BASOPHILS # BLD AUTO: 0.1 10*3/UL (ref 0–0.1)
BASOPHILS NFR BLD AUTO: 0.4 % (ref 0–1)
BUN SERPL-MCNC: 24 MG/DL (ref 7–26)
BUN/CREAT SERPL: 31 (ref 6–25)
CALCIUM SERPL-MCNC: 9.1 MG/DL (ref 8.4–10.2)
CHLORIDE SERPL-SCNC: 104 MMOL/L (ref 98–107)
CO2 SERPL-SCNC: 25 MMOL/L (ref 22–29)
DEPRECATED NEUTROPHILS # BLD AUTO: 11 10*3/UL (ref 2.1–6.9)
EGFRCR SERPLBLD CKD-EPI 2021: > 60 ML/MIN (ref 60–?)
EOSINOPHIL # BLD AUTO: 0.2 10*3/UL (ref 0–0.4)
EOSINOPHIL NFR BLD AUTO: 1.7 % (ref 0–6)
ERYTHROCYTE [DISTWIDTH] IN CORD BLOOD: 30.5 % (ref 11.7–14.4)
GLUCOSE SERPLBLD-MCNC: 87 MG/DL (ref 74–118)
HCT VFR BLD AUTO: 29.1 % (ref 34.2–44.1)
HGB BLD-MCNC: 8.5 G/DL (ref 12–16)
LYMPHOCYTES # BLD: 1.3 10*3/UL (ref 1–3.2)
LYMPHOCYTES NFR BLD AUTO: 9.9 % (ref 18–39.1)
MCH RBC QN AUTO: 23.4 PG (ref 28–32)
MCHC RBC AUTO-ENTMCNC: 29.2 G/DL (ref 31–35)
MCV RBC AUTO: 79.9 FL (ref 81–99)
MONOCYTES # BLD AUTO: 0.9 10*3/UL (ref 0.2–0.8)
MONOCYTES NFR BLD AUTO: 6.9 % (ref 4.4–11.3)
NEUTS SEG NFR BLD AUTO: 80.7 % (ref 38.7–80)
PLATELET # BLD AUTO: 328 X10E3/UL (ref 140–360)
POTASSIUM SERPL-SCNC: 5.2 MMOL/L (ref 3.5–5.1)
RBC # BLD AUTO: 3.64 X10E6/UL (ref 3.6–5.1)
SODIUM SERPL-SCNC: 134 MMOL/L (ref 136–145)

## 2018-05-06 RX ADMIN — METOPROLOL SUCCINATE SCH MG: 50 TABLET, EXTENDED RELEASE ORAL at 17:15

## 2018-05-06 RX ADMIN — METOPROLOL SUCCINATE SCH MG: 50 TABLET, EXTENDED RELEASE ORAL at 09:13

## 2018-05-06 RX ADMIN — AMLODIPINE BESYLATE SCH MG: 10 TABLET ORAL at 09:13

## 2018-05-06 RX ADMIN — CHOLESTYRAMINE LIGHT SCH GM: 4 POWDER, FOR SUSPENSION ORAL at 09:13

## 2018-05-06 RX ADMIN — LEVOTHYROXINE SODIUM SCH MCG: 50 TABLET ORAL at 06:37

## 2018-05-06 RX ADMIN — MEGESTROL ACETATE SCH MG: 40 SUSPENSION ORAL at 09:12

## 2018-05-06 RX ADMIN — HYDROCODONE BITARTRATE AND ACETAMINOPHEN PRN EA: 5; 325 TABLET ORAL at 20:55

## 2018-05-06 RX ADMIN — ASPIRIN 81 MG CHEWABLE TABLET SCH MG: 81 TABLET CHEWABLE at 09:12

## 2018-05-06 RX ADMIN — ALLOPURINOL SCH MG: 100 TABLET ORAL at 09:13

## 2018-05-06 RX ADMIN — CHOLESTYRAMINE LIGHT SCH GM: 4 POWDER, FOR SUSPENSION ORAL at 17:00

## 2018-05-06 RX ADMIN — FUROSEMIDE SCH MG: 20 TABLET ORAL at 09:12

## 2018-05-06 RX ADMIN — PRAMIPEXOLE DIHYDROCHLORIDE SCH MG: 0.25 TABLET ORAL at 20:55

## 2018-05-06 RX ADMIN — LOSARTAN POTASSIUM SCH MG: 100 TABLET, FILM COATED ORAL at 09:12

## 2018-05-06 NOTE — PROGRESS NOTE
DATE:  May 06, 2018 



CARDIOLOGY PROGRESS NOTE



SUBJECTIVE:  Patient is without any complaints.  She denies any shortness 

of breath, chest pain or palpitations.  She states she is eagerly awaiting 

her colonoscopy tomorrow.  



OBJECTIVE   

VITAL SIGNS:  Temperature 98.8, pulse 94, respiratory rate 20.  Blood 

pressure 151/67.  Oxygen saturation is 100% on 2 liters nasal cannula.  



CARDIOVASCULAR MEDICATIONS 

1. Levothyroxine 50 mcg p.o. daily.

2. Furosemide 20 mg p.o. daily.  

3. Amlodipine 10 p.o. daily.

4. Metoprolol 5 mg IV q.6 h. p.r.n. for hypertension.  

5. Losartan 100 mg p.o. daily.  

6. Aspirin 81 p.o. daily.

7. Metoprolol 100 mg p.o. b.i.d. 



LABS:  WBC 13.59, hemoglobin 8.5, hematocrit 29.1, platelets 328.  Sodium 

134, potassium 5.2, BUN 24, creatinine 0.78, calcium 9.1.



PHYSICAL EXAMINATION 

GENERAL:   Alert and oriented times 3, resting comfortably in bed, does not 

appear to be in any acute distress. 

LUNGS:  Clear to auscultation throughout.  However, scattered fine crackles 

anteriorly in lower lobes.  No wheezing.  No rhonchi.  

ABDOMEN:  Soft.  Nontender.  

LOWER EXTREMITIES:  No edema. 

CARDIOVASCULAR:  Regular rate and rhythm.  Normal S1 and S2.  No rubs and 

no gallops noted.  



IMPRESSION

1. Acute anemia, suspected gastrointestinal bleed with positive occult 

stool.  

2. Candida esophagitis and gastritis noted on 

esophagogastroduodenoscopy. 

3. Pseudomonas aeruginosa urinary tract infection.

4. Acute-on-chronic kidney injury.

5. Iron deficiency anemia.

6. Acute-on-chronic diastolic heart failure.

7. Chest pain feature resolved.

8. History of deep vein thrombosis and pulmonary embolism.   

9. Atrial fibrillation.

10. Hypertension.  



RECOMMENDATIONS:  Continue the above-listed cardiac medications.  Maintain 

on telemetry.  Noted to be in sinus rhythm.  Continue to monitor the blood 

pressure closely.  Blood pressure is acceptable for age at this time and 

re-evaluate.  Continue to hold warfarin with reported colonoscopy scheduled 

for tomorrow.  Continue care with GI.  Will continue to follow very 

closely. 



Dictated by Nahomi Villalta NP. 







DD:  05/06/2018 09:14

DT:  05/06/2018 10:12

Job#:  [id number]

## 2018-05-07 VITALS — DIASTOLIC BLOOD PRESSURE: 49 MMHG | SYSTOLIC BLOOD PRESSURE: 132 MMHG

## 2018-05-07 VITALS — DIASTOLIC BLOOD PRESSURE: 64 MMHG | SYSTOLIC BLOOD PRESSURE: 170 MMHG

## 2018-05-07 VITALS — DIASTOLIC BLOOD PRESSURE: 44 MMHG | SYSTOLIC BLOOD PRESSURE: 99 MMHG

## 2018-05-07 VITALS — SYSTOLIC BLOOD PRESSURE: 148 MMHG | DIASTOLIC BLOOD PRESSURE: 64 MMHG

## 2018-05-07 VITALS — DIASTOLIC BLOOD PRESSURE: 64 MMHG | SYSTOLIC BLOOD PRESSURE: 142 MMHG

## 2018-05-07 VITALS — SYSTOLIC BLOOD PRESSURE: 99 MMHG | DIASTOLIC BLOOD PRESSURE: 44 MMHG

## 2018-05-07 VITALS — DIASTOLIC BLOOD PRESSURE: 61 MMHG | SYSTOLIC BLOOD PRESSURE: 132 MMHG

## 2018-05-07 VITALS — DIASTOLIC BLOOD PRESSURE: 64 MMHG | SYSTOLIC BLOOD PRESSURE: 148 MMHG

## 2018-05-07 LAB
ANION GAP SERPL CALC-SCNC: 12 MMOL/L (ref 8–16)
ANISOCYTOSIS BLD QL SMEAR: (no result)
BASOPHILS # BLD AUTO: 0.1 10*3/UL (ref 0–0.1)
BASOPHILS NFR BLD AUTO: 0.9 % (ref 0–1)
BUN SERPL-MCNC: 26 MG/DL (ref 7–26)
BUN/CREAT SERPL: 33 (ref 6–25)
CALCIUM SERPL-MCNC: 9.7 MG/DL (ref 8.4–10.2)
CHLORIDE SERPL-SCNC: 107 MMOL/L (ref 98–107)
CO2 SERPL-SCNC: 21 MMOL/L (ref 22–29)
DEPRECATED NEUTROPHILS # BLD AUTO: 6 10*3/UL (ref 2.1–6.9)
EGFRCR SERPLBLD CKD-EPI 2021: > 60 ML/MIN (ref 60–?)
EOSINOPHIL # BLD AUTO: 0.3 10*3/UL (ref 0–0.4)
EOSINOPHIL NFR BLD AUTO: 3.5 % (ref 0–6)
ERYTHROCYTE [DISTWIDTH] IN CORD BLOOD: 30.3 % (ref 11.7–14.4)
GLUCOSE SERPLBLD-MCNC: 83 MG/DL (ref 74–118)
HCT VFR BLD AUTO: 28.5 % (ref 34.2–44.1)
HGB BLD-MCNC: 8.3 G/DL (ref 12–16)
HYPOCHROMIA BLD QL SMEAR: SLIGHT
LYMPHOCYTES # BLD: 1.1 10*3/UL (ref 1–3.2)
LYMPHOCYTES NFR BLD AUTO: 12.8 % (ref 18–39.1)
MCH RBC QN AUTO: 23.6 PG (ref 28–32)
MCHC RBC AUTO-ENTMCNC: 29.1 G/DL (ref 31–35)
MCV RBC AUTO: 81.2 FL (ref 81–99)
MONOCYTES # BLD AUTO: 0.8 10*3/UL (ref 0.2–0.8)
MONOCYTES NFR BLD AUTO: 10 % (ref 4.4–11.3)
NEUTS SEG NFR BLD AUTO: 72.4 % (ref 38.7–80)
PLAT MORPH BLD: NORMAL
PLATELET # BLD AUTO: 348 X10E3/UL (ref 140–360)
PLATELET # BLD EST: ADEQUATE 10*3/UL
POIKILOCYTOSIS BLD QL SMEAR: (no result)
POTASSIUM SERPL-SCNC: 5 MMOL/L (ref 3.5–5.1)
RBC # BLD AUTO: 3.51 X10E6/UL (ref 3.6–5.1)
RBC MORPH BLD: (no result)
SODIUM SERPL-SCNC: 135 MMOL/L (ref 136–145)

## 2018-05-07 RX ADMIN — LOSARTAN POTASSIUM SCH MG: 100 TABLET, FILM COATED ORAL at 09:00

## 2018-05-07 RX ADMIN — METOPROLOL SUCCINATE SCH MG: 50 TABLET, EXTENDED RELEASE ORAL at 09:00

## 2018-05-07 RX ADMIN — CHOLESTYRAMINE LIGHT SCH GM: 4 POWDER, FOR SUSPENSION ORAL at 15:47

## 2018-05-07 RX ADMIN — LEVOTHYROXINE SODIUM SCH MCG: 50 TABLET ORAL at 03:54

## 2018-05-07 RX ADMIN — PRAMIPEXOLE DIHYDROCHLORIDE SCH MG: 0.25 TABLET ORAL at 21:09

## 2018-05-07 RX ADMIN — TAZOBACTAM SODIUM AND PIPERACILLIN SODIUM SCH MLS/HR: 375; 3 INJECTION, SOLUTION INTRAVENOUS at 22:40

## 2018-05-07 RX ADMIN — ALLOPURINOL SCH MG: 100 TABLET ORAL at 09:00

## 2018-05-07 RX ADMIN — FUROSEMIDE SCH MG: 20 TABLET ORAL at 09:00

## 2018-05-07 RX ADMIN — ASPIRIN 81 MG CHEWABLE TABLET SCH MG: 81 TABLET CHEWABLE at 09:00

## 2018-05-07 RX ADMIN — AMLODIPINE BESYLATE SCH MG: 10 TABLET ORAL at 09:00

## 2018-05-07 RX ADMIN — CHOLESTYRAMINE LIGHT SCH GM: 4 POWDER, FOR SUSPENSION ORAL at 09:00

## 2018-05-07 RX ADMIN — MEGESTROL ACETATE SCH MG: 40 SUSPENSION ORAL at 09:00

## 2018-05-07 RX ADMIN — METOPROLOL SUCCINATE SCH MG: 50 TABLET, EXTENDED RELEASE ORAL at 16:36

## 2018-05-07 NOTE — DIAGNOSTIC IMAGING REPORT
PROCEDURE:

A single AP view of the chest.

 

COMPARISON: 4/21/18

 

INDICATIONS:   SHORTNESS OF BREATH, COUGH

     

FINDINGS:

Lines/tubes:  None.

 

Lungs:  The lungs are well inflated. Multi-focal opacities, most 

prominent in right upper lobe.

 

Pleura:  There is no significant pleural effusion or pneumothorax.

 

Heart and mediastinum:  The heart and the mediastinum are unremarkable.

 

Bones:  No acute bony abnormality.

 

IMPRESSION: 

 

Airspace opacities, especially in right upper lobe, concerning for 

pneumonia.

 

Dictated by:  Jae Escobar M.D. on 5/07/2018 at 18:44     

Electronically approved by:  Jae Escobar M.D. on 5/07/2018 at 18:44

## 2018-05-07 NOTE — PROGRESS NOTE
DATE:  May 07, 2018 



CARDIOLOGY PROGRESS NOTE



SUBJECTIVE:  Patient denies chest pain or shortness of breath. She 

complains of congestion.



OBJECTIVE

VITAL SIGNS:  Temperature 97.7 degrees, pulse 96, respiratory rate 18, 

blood pressure 170/64, oxygen saturation 100% on room air.

GENERAL:  Elderly woman, frail, no acute distress. Chronically 

ill-appearing.

LUNGS:  Clear to auscultation bilaterally. No wheezes or crackles. 

CARDIOVASCULAR:  Normal rate, regular rhythm. No murmur. Normal S1 and S2. 

ABDOMEN:  Soft, nontender. 

EXTREMITIES:  No edema. 



CARDIAC MEDICATIONS

1. Metoprolol succinate 100 mg p.o. b.i.d.

2. Furosemide 20 mg p.o. daily.

3. Losartan 100 mg p.o. daily.

4. Amlodipine 10 mg p.o. daily.

5. Levothyroxine 50 mcg p.o. daily.

6. Aspirin 81 mg p.o. daily.



LABS:  WBC 8.22, hemoglobin 8.3, hematocrit 28.5, platelets 348. Sodium 

135, potassium 4, chloride 107, CO2 21, BUN 26, creatinine 0.79. 



IMPRESSION

1. Acute anemia, suspect gastrointestinal bleed with positive stool 

occult blood.

2. Candida esophagitis and gastritis as noted on 

esophagogastroduodenoscopy.

3. Pseudomonas aeruginosa urinary tract infection.

4. Acute-on-chronic kidney disease.

5. Iron deficiency anemia.

6. Acute-on-chronic diastolic heart failure.

7. Chest pain, resolved.

8. History of deep vein thrombosis/pulmonary embolism.

9. Reported history of atrial fibrillation.

10. Hypertension.



RECOMMENDATIONS:  The patient ruled out for a myocardial infarction with 

serial cardiac biomarkers. Continue current cardiac medications. Warfarin 

is on hold given anemia. Currently undergoing GI evaluation for possible GI 

source of bleeding. Patient was unable to undergo colonoscopy today because 

she was not adequately clear after GoLYTELY prep. Attempting colonoscopy 

tomorrow. Antibiotics per primary service. Discussed resuming 

anticoagulation once GI evaluation is complete. Check chest x-ray given her 

complaint of cough. Blood pressure is not well controlled. However, patient 

missed several of her medications this morning due to her n.p.o. status.



Thank you for this consult. We will continue to follow.













DD:  05/07/2018 18:10

DT:  05/07/2018 18:58

Job#:  [id number]

## 2018-05-08 VITALS — SYSTOLIC BLOOD PRESSURE: 133 MMHG | DIASTOLIC BLOOD PRESSURE: 57 MMHG

## 2018-05-08 VITALS — SYSTOLIC BLOOD PRESSURE: 138 MMHG | DIASTOLIC BLOOD PRESSURE: 52 MMHG

## 2018-05-08 VITALS — DIASTOLIC BLOOD PRESSURE: 56 MMHG | SYSTOLIC BLOOD PRESSURE: 123 MMHG

## 2018-05-08 VITALS — DIASTOLIC BLOOD PRESSURE: 56 MMHG | SYSTOLIC BLOOD PRESSURE: 111 MMHG

## 2018-05-08 VITALS — DIASTOLIC BLOOD PRESSURE: 70 MMHG | SYSTOLIC BLOOD PRESSURE: 118 MMHG

## 2018-05-08 LAB
ANION GAP SERPL CALC-SCNC: 11.3 MMOL/L (ref 8–16)
ANISOCYTOSIS BLD QL SMEAR: (no result)
BASO STIPL BLD QL SMEAR: (no result)
BASOPHILS # BLD AUTO: 0 10*3/UL (ref 0–0.1)
BASOPHILS NFR BLD AUTO: 0.7 % (ref 0–1)
BUN SERPL-MCNC: 24 MG/DL (ref 7–26)
BUN/CREAT SERPL: 29 (ref 6–25)
CALCIUM SERPL-MCNC: 9.2 MG/DL (ref 8.4–10.2)
CHLORIDE SERPL-SCNC: 106 MMOL/L (ref 98–107)
CO2 SERPL-SCNC: 25 MMOL/L (ref 22–29)
DEPRECATED NEUTROPHILS # BLD AUTO: 4 10*3/UL (ref 2.1–6.9)
EGFRCR SERPLBLD CKD-EPI 2021: > 60 ML/MIN (ref 60–?)
ELLIPTOCYTES BLD QL SMEAR: SLIGHT
EOSINOPHIL # BLD AUTO: 0.2 10*3/UL (ref 0–0.4)
EOSINOPHIL NFR BLD AUTO: 3.7 % (ref 0–6)
ERYTHROCYTE [DISTWIDTH] IN CORD BLOOD: 30.4 % (ref 11.7–14.4)
GLUCOSE SERPLBLD-MCNC: 75 MG/DL (ref 74–118)
HCT VFR BLD AUTO: 24 % (ref 34.2–44.1)
HGB BLD-MCNC: 7.1 G/DL (ref 12–16)
HOWELL-JOLLY BOD BLD QL SMEAR: (no result)
HYPOCHROMIA BLD QL SMEAR: (no result)
LYMPHOCYTES # BLD: 0.9 10*3/UL (ref 1–3.2)
LYMPHOCYTES NFR BLD AUTO: 15.5 % (ref 18–39.1)
MCH RBC QN AUTO: 23.5 PG (ref 28–32)
MCHC RBC AUTO-ENTMCNC: 29.6 G/DL (ref 31–35)
MCV RBC AUTO: 79.5 FL (ref 81–99)
MONOCYTES # BLD AUTO: 0.7 10*3/UL (ref 0.2–0.8)
MONOCYTES NFR BLD AUTO: 12.1 % (ref 4.4–11.3)
NEUTS SEG NFR BLD AUTO: 67.7 % (ref 38.7–80)
PLAT MORPH BLD: (no result)
PLATELET # BLD AUTO: 277 X10E3/UL (ref 140–360)
PLATELET # BLD EST: ADEQUATE 10*3/UL
POIKILOCYTOSIS BLD QL SMEAR: SLIGHT
POTASSIUM SERPL-SCNC: 4.3 MMOL/L (ref 3.5–5.1)
RBC # BLD AUTO: 3.02 X10E6/UL (ref 3.6–5.1)
RBC MORPH BLD: (no result)
SODIUM SERPL-SCNC: 138 MMOL/L (ref 136–145)

## 2018-05-08 PROCEDURE — 0DBM8ZX EXCISION OF DESCENDING COLON, VIA NATURAL OR ARTIFICIAL OPENING ENDOSCOPIC, DIAGNOSTIC: ICD-10-PCS | Performed by: INTERNAL MEDICINE

## 2018-05-08 PROCEDURE — 30233N1 TRANSFUSION OF NONAUTOLOGOUS RED BLOOD CELLS INTO PERIPHERAL VEIN, PERCUTANEOUS APPROACH: ICD-10-PCS | Performed by: INTERNAL MEDICINE

## 2018-05-08 PROCEDURE — 0DBN8ZX EXCISION OF SIGMOID COLON, VIA NATURAL OR ARTIFICIAL OPENING ENDOSCOPIC, DIAGNOSTIC: ICD-10-PCS | Performed by: INTERNAL MEDICINE

## 2018-05-08 RX ADMIN — MEGESTROL ACETATE SCH MG: 40 SUSPENSION ORAL at 14:15

## 2018-05-08 RX ADMIN — LEVOTHYROXINE SODIUM SCH MCG: 50 TABLET ORAL at 06:09

## 2018-05-08 RX ADMIN — METOPROLOL SUCCINATE SCH MG: 50 TABLET, EXTENDED RELEASE ORAL at 09:00

## 2018-05-08 RX ADMIN — TAZOBACTAM SODIUM AND PIPERACILLIN SODIUM SCH MLS/HR: 375; 3 INJECTION, SOLUTION INTRAVENOUS at 21:31

## 2018-05-08 RX ADMIN — Medication PRN MG: at 04:50

## 2018-05-08 RX ADMIN — METOPROLOL SUCCINATE SCH MG: 50 TABLET, EXTENDED RELEASE ORAL at 17:00

## 2018-05-08 RX ADMIN — FUROSEMIDE SCH MG: 20 TABLET ORAL at 14:15

## 2018-05-08 RX ADMIN — ASPIRIN 81 MG CHEWABLE TABLET SCH MG: 81 TABLET CHEWABLE at 14:15

## 2018-05-08 RX ADMIN — LOSARTAN POTASSIUM SCH MG: 100 TABLET, FILM COATED ORAL at 14:15

## 2018-05-08 RX ADMIN — PRAMIPEXOLE DIHYDROCHLORIDE SCH MG: 0.25 TABLET ORAL at 21:31

## 2018-05-08 RX ADMIN — TAZOBACTAM SODIUM AND PIPERACILLIN SODIUM SCH MLS/HR: 375; 3 INJECTION, SOLUTION INTRAVENOUS at 14:15

## 2018-05-08 RX ADMIN — TAZOBACTAM SODIUM AND PIPERACILLIN SODIUM SCH MLS/HR: 375; 3 INJECTION, SOLUTION INTRAVENOUS at 06:09

## 2018-05-08 RX ADMIN — AMLODIPINE BESYLATE SCH MG: 10 TABLET ORAL at 09:35

## 2018-05-08 RX ADMIN — CHOLESTYRAMINE LIGHT SCH GM: 4 POWDER, FOR SUSPENSION ORAL at 14:15

## 2018-05-08 RX ADMIN — ALLOPURINOL SCH MG: 100 TABLET ORAL at 09:35

## 2018-05-08 RX ADMIN — Medication SCH ML: at 13:00

## 2018-05-08 RX ADMIN — Medication SCH ML: at 19:30

## 2018-05-08 NOTE — OPERATIVE REPORT
DATE OF PROCEDURE:  May 08, 2018 



REFERRING PHYSICIAN:  Long Cotter MD 



PROCEDURE PERFORMED:  Colonoscopy with biopsies.  



INDICATIONS FOR COLONOSCOPY:  Anemia, guaiac-positive stools.



MEDICATION:  Patient was done under MAC.  Please see anesthesiologist's 

note.



PROCEDURE:  With the patient in the left lateral decubitus position, the 

flexible fiberoptic Olympus colonoscope was inserted into the rectum with 

ease and advanced all the way to the cecum.  It was then withdrawn slowly.  

Mucosa overlying the cecum, ascending colon, and transverse colon grossly 

appeared to be within normal limits.  There were some scattered minute 

ulcerations noted in the distal descending and the sigmoid colon, and 

biopsies were obtained.  This might represent a resolving ischemic colitis. 

 There was some diverticular disease noted in the sigmoid colon.  The 

rectum grossly appeared to be within normal limits.  The scope was then 

retroflexed into the distal rectum, and the area around the dentate line 

appeared to be within normal limits.  The scope was then straightened out.  

The rectosigmoid area as well as the distal rectal area were decompressed.  

The scope was subsequently withdrawn.  Patient tolerated the procedure 

well.



IMPRESSION  

1. Colitis, resolving, (?)ischemic, distal descending and sigmoid colon. 

 Biopsies obtained.

2. Diverticulosis.





PLAN:  Follow up histology.  Initiate GI soft diet.  There is no need for a 

followup colonoscopy.  





DD:  05/08/2018 12:36

DT:  05/08/2018 13:33

Job#:  [id number] 



cc:LONG COTTER MD

## 2018-05-08 NOTE — CONSULTATION
DATE OF CONSULTATION:    



PULMONARY CONSULTATION

A charming but unfortunate 83-year-old woman admitted with low blood 

pressure noted by home health nurses.  She was also found to be in renal 

failure with elevated potassium.  She has a history of hypertension, 

intermittent atrial fibrillation, DVT in the past, chronic kidney disease, 

COPD, gouty arthritis.  She was found to have a urinary tract infection. 



ALLERGIES:  SHE IS ALLERGIC TO SULFA, LEVAQUIN AND STATINS.



Currently, she is on Zosyn, allopurinol, Norvasc, aspirin, Questran, 

Vicodin, Levoxyl, losartan, Megace, metoprolol, pramipexole.



She is an ex-smoker.  Smoked for 35 years.  Quit in 1990s.  .



FAMILY HISTORY:  Noncontributory.



She has had tubal ligation, hysterectomy and appendectomy.  



PHYSICAL EXAMINATION 

GENERAL:  She is a slight white female in no acute distress.  Awaiting 

colonoscopy.  

VITALS:  Temperature 97.8, pulse 92, respirations 18, blood pressure 

123/81.

HEENT:  Head is normocephalic and atraumatic.  

LUNGS:  Few rhonchi.

HEART:  Regular rhythm.

ABDOMEN:  Nontender.

EXTREMITIES:  Nonedematous.



Chest x-ray suggests right upper lobe pneumonia.  Vague and raring fluid in 

the supine position is also a possibility.  



PLAN:  Continue antibiotics.  GI evaluation.  Ultrasound of chest.  Follow 

2-view chest x-ray.  Inhaled bronchodilators.  Will attempt to avoid beta-2 

agonist in view of her history of atrial fibrillation.  Continue 

anticoagulation in view of her sedentary lifestyle and history of DVT.  She 

currently uses a walker.  



Thank you for this kind referral.  

 



 





DD:  05/08/2018 10:04

DT:  05/08/2018 10:11

Job#:  [id number]

## 2018-05-08 NOTE — DIAGNOSTIC IMAGING REPORT
PROCEDURE:US CHEST (INCL MEDIASTINUM)

 

COMPARISON:None.

 

INDICATIONS:EFFUSION

 

FINDINGS:Transverse and longitudinal images of the chest were 

performed for purpose of identifying effusions.

Examination shows a small right and small-to-moderate left pleural 

effusions with associated lower lobe atelectatic changes.

 

CONCLUSION:

1. Small right and small-to-moderate left pleural effusions with 

associated lower lobe atelectatic changes 

 

 

Emerson Davis M.D.  

Dictated by:  Emerson Davis M.D. on 5/08/2018 at 17:56     

Electronically approved by:  Emerson Davis M.D. on 5/08/2018 at 

17:56

## 2018-05-08 NOTE — PROGRESS NOTE
DATE:  May 08, 2018 



CARDIOLOGY PROGRESS NOTE



SUBJECTIVE:  Patient denies chest pain or shortness of breath. She 

underwent colonoscopy today which revealed resolving colitis in the distal 

descending and sigmoid colon, possibly ischemic, biopsies were obtained, 

and diverticulosis. A chest x-ray that was ordered for new-onset cough was 

suspicious for air-space opacities in the right upper lobe concerning for 

pneumonia. Chest x-ray PA and lateral is pending.



OBJECTIVE

VITAL SIGNS:  Temperature 96.7 degrees, pulse 93, respiratory rate is 20, 

blood pressure 117/50, oxygen saturation 97% on 2 liters nasal cannula.

GENERAL:  Cachectic, elderly, frail woman in no acute distress.

LUNGS:  Clear to auscultation bilaterally. No wheezes or crackles. 

CARDIOVASCULAR:  Normal rate, regular rhythm. No murmur. Normal S1 and S2. 

ABDOMEN:  Soft, nontender. 

EXTREMITIES:  No edema. 



CARDIAC MEDICATIONS

1. Losartan 100 mg p.o. daily.

2. Furosemide 20 mg p.o. daily. 

3. Aspirin 81 mg p.o. daily.

4. Amlodipine 10 mg p.o. daily.

5. Levothyroxine 50 mcg p.o. daily.

6. Metoprolol succinate 100 mg p.o. b.i.d. 



LABS:  WBC 5.88, hemoglobin 7.1, hematocrit 24, platelets 277. Sodium 138, 

potassium 4.3, chloride 106, CO2 25, BUN 24, creatinine 0.83. 



IMPRESSION

1. Acute anemia, suspect gastrointestinal bleeding due to positive stool 

occult blood.

2. Candida esophagitis and gastritis as noted on 

esophagogastroduodenoscopy.

3. Resolving colitis, possibly ischemic.

4. Pseudomonas aeruginosa urinary tract infection.

5. Suspected right upper lobe pneumonia.

6. Acute-on-chronic kidney disease.

7. Iron deficiency anemia.

8. Acute-on-chronic diastolic heart failure.

9. Chest pain, resolved.

10. History of deep vein thrombosis/pulmonary embolism.

11. Reported history of atrial fibrillation.

12. Hypertension.



RECOMMENDATIONS:  Patient ruled out for myocardial infarction with serial 

cardiac biomarkers. Continue current cardiac medications. Warfarin is on 

hold given downtrending hemoglobin and hematocrit. Currently undergoing GI 

evaluation for possible GI source of bleeding. Await biopsy results. 

Antibiotics per primary service. Patient's blood pressure is well 

controlled today. Continue current antihypertensive therapies. 



Thank you for this consult. We will continue to follow.













DD:  05/08/2018 15:08

DT:  05/08/2018 15:20

Job#:  [id number]

## 2018-05-09 VITALS — DIASTOLIC BLOOD PRESSURE: 55 MMHG | SYSTOLIC BLOOD PRESSURE: 120 MMHG

## 2018-05-09 VITALS — DIASTOLIC BLOOD PRESSURE: 60 MMHG | SYSTOLIC BLOOD PRESSURE: 126 MMHG

## 2018-05-09 VITALS — DIASTOLIC BLOOD PRESSURE: 68 MMHG | SYSTOLIC BLOOD PRESSURE: 157 MMHG

## 2018-05-09 VITALS — DIASTOLIC BLOOD PRESSURE: 63 MMHG | SYSTOLIC BLOOD PRESSURE: 143 MMHG

## 2018-05-09 VITALS — DIASTOLIC BLOOD PRESSURE: 67 MMHG | SYSTOLIC BLOOD PRESSURE: 142 MMHG

## 2018-05-09 VITALS — SYSTOLIC BLOOD PRESSURE: 142 MMHG | DIASTOLIC BLOOD PRESSURE: 67 MMHG

## 2018-05-09 VITALS — SYSTOLIC BLOOD PRESSURE: 157 MMHG | DIASTOLIC BLOOD PRESSURE: 68 MMHG

## 2018-05-09 LAB
ALBUMIN SERPL-MCNC: 2.1 G/DL (ref 3.5–5)
ALBUMIN/GLOB SERPL: 0.7 {RATIO} (ref 0.8–2)
ALP SERPL-CCNC: 46 IU/L (ref 40–150)
ALT SERPL-CCNC: 8 IU/L (ref 0–55)
ANION GAP SERPL CALC-SCNC: 10.1 MMOL/L (ref 8–16)
ANISOCYTOSIS BLD QL SMEAR: (no result)
BASOPHILS # BLD AUTO: 0 10*3/UL (ref 0–0.1)
BASOPHILS NFR BLD AUTO: 0.5 % (ref 0–1)
BUN SERPL-MCNC: 22 MG/DL (ref 7–26)
BUN/CREAT SERPL: 21 (ref 6–25)
CALCIUM SERPL-MCNC: 8.8 MG/DL (ref 8.4–10.2)
CHLORIDE SERPL-SCNC: 106 MMOL/L (ref 98–107)
CO2 SERPL-SCNC: 24 MMOL/L (ref 22–29)
DEPRECATED NEUTROPHILS # BLD AUTO: 6.3 10*3/UL (ref 2.1–6.9)
EGFRCR SERPLBLD CKD-EPI 2021: 51 ML/MIN (ref 60–?)
EOSINOPHIL # BLD AUTO: 0.2 10*3/UL (ref 0–0.4)
EOSINOPHIL NFR BLD AUTO: 2.2 % (ref 0–6)
ERYTHROCYTE [DISTWIDTH] IN CORD BLOOD: 23.5 % (ref 11.7–14.4)
GLOBULIN PLAS-MCNC: 2.9 G/DL (ref 2.3–3.5)
GLUCOSE SERPLBLD-MCNC: 75 MG/DL (ref 74–118)
HCT VFR BLD AUTO: 37.5 % (ref 34.2–44.1)
HGB BLD-MCNC: 12.1 G/DL (ref 12–16)
LYMPHOCYTES # BLD: 1.1 10*3/UL (ref 1–3.2)
LYMPHOCYTES NFR BLD AUTO: 13.2 % (ref 18–39.1)
MAGNESIUM SERPL-MCNC: 1.3 MG/DL (ref 1.3–2.1)
MCH RBC QN AUTO: 26.2 PG (ref 28–32)
MCHC RBC AUTO-ENTMCNC: 32.3 G/DL (ref 31–35)
MCV RBC AUTO: 81.3 FL (ref 81–99)
MONOCYTES # BLD AUTO: 0.8 10*3/UL (ref 0.2–0.8)
MONOCYTES NFR BLD AUTO: 9.7 % (ref 4.4–11.3)
NEUTS SEG NFR BLD AUTO: 73.9 % (ref 38.7–80)
PLAT MORPH BLD: NORMAL
PLATELET # BLD AUTO: 249 X10E3/UL (ref 140–360)
PLATELET # BLD EST: ADEQUATE 10*3/UL
POTASSIUM SERPL-SCNC: 4.1 MMOL/L (ref 3.5–5.1)
RBC # BLD AUTO: 4.61 X10E6/UL (ref 3.6–5.1)
RBC MORPH BLD: NORMAL
SODIUM SERPL-SCNC: 136 MMOL/L (ref 136–145)

## 2018-05-09 RX ADMIN — AMLODIPINE BESYLATE SCH MG: 10 TABLET ORAL at 07:59

## 2018-05-09 RX ADMIN — TAZOBACTAM SODIUM AND PIPERACILLIN SODIUM SCH MLS/HR: 375; 3 INJECTION, SOLUTION INTRAVENOUS at 06:56

## 2018-05-09 RX ADMIN — TAZOBACTAM SODIUM AND PIPERACILLIN SODIUM SCH MLS/HR: 375; 3 INJECTION, SOLUTION INTRAVENOUS at 13:48

## 2018-05-09 RX ADMIN — LEVOTHYROXINE SODIUM SCH MCG: 50 TABLET ORAL at 06:18

## 2018-05-09 RX ADMIN — CHOLESTYRAMINE LIGHT SCH GM: 4 POWDER, FOR SUSPENSION ORAL at 16:28

## 2018-05-09 RX ADMIN — TAZOBACTAM SODIUM AND PIPERACILLIN SODIUM SCH MLS/HR: 375; 3 INJECTION, SOLUTION INTRAVENOUS at 21:47

## 2018-05-09 RX ADMIN — ALLOPURINOL SCH MG: 100 TABLET ORAL at 07:59

## 2018-05-09 RX ADMIN — PRAMIPEXOLE DIHYDROCHLORIDE SCH MG: 0.25 TABLET ORAL at 21:47

## 2018-05-09 RX ADMIN — Medication SCH ML: at 19:40

## 2018-05-09 RX ADMIN — ASPIRIN 81 MG CHEWABLE TABLET SCH MG: 81 TABLET CHEWABLE at 07:59

## 2018-05-09 RX ADMIN — PANTOPRAZOLE SODIUM SCH MG: 40 TABLET, DELAYED RELEASE ORAL at 07:59

## 2018-05-09 RX ADMIN — METOPROLOL SUCCINATE SCH MG: 50 TABLET, EXTENDED RELEASE ORAL at 07:59

## 2018-05-09 RX ADMIN — FUROSEMIDE SCH MG: 20 TABLET ORAL at 07:59

## 2018-05-09 RX ADMIN — Medication SCH ML: at 00:45

## 2018-05-09 RX ADMIN — LOSARTAN POTASSIUM SCH MG: 100 TABLET, FILM COATED ORAL at 07:59

## 2018-05-09 RX ADMIN — Medication SCH ML: at 08:30

## 2018-05-09 RX ADMIN — HYDROCODONE BITARTRATE AND ACETAMINOPHEN PRN EA: 5; 325 TABLET ORAL at 21:47

## 2018-05-09 RX ADMIN — Medication SCH ML: at 13:00

## 2018-05-09 RX ADMIN — MEGESTROL ACETATE SCH MG: 40 SUSPENSION ORAL at 07:59

## 2018-05-09 RX ADMIN — METOPROLOL SUCCINATE SCH MG: 50 TABLET, EXTENDED RELEASE ORAL at 16:29

## 2018-05-09 RX ADMIN — CHOLESTYRAMINE LIGHT SCH GM: 4 POWDER, FOR SUSPENSION ORAL at 07:59

## 2018-05-09 NOTE — PROGRESS NOTE
DATE:  May 09, 2018 



CARDIOLOGY PROGRESS NOTE



SUBJECTIVE:  Patient denies chest pain or shortness of breath.  She reports 

her cough is better.



OBJECTIVE

VITAL SIGNS:  Temperature 98 degrees, pulse 85, respiratory rate 20, blood 

pressure 157/68, oxygen saturation 100% on 2 L nasal cannula.

GENERAL:  Awake, alert, cachectic, elderly, frail woman in no acute 

distress.

LUNGS:  Clear to auscultation bilaterally.  No wheezes or crackles. 

CARDIOVASCULAR:  Normal rate, regular rhythm.  No murmur.  Normal S1 and 

S2. 

ABDOMEN:  Soft, nontender. 

EXTREMITIES:  No edema. 



CARDIAC MEDICATIONS

1. Losartan 100 mg p.o. daily.

2. Furosemide 20 mg p.o. daily. 

3. Metoprolol succinate 100 mg p.o. b.i.d. 

4. Amlodipine 10 mg p.o. daily.

5. Aspirin 81 mg p.o. daily.

6. Levothyroxine 50 mcg p.o. daily.



LABS:  WBC 8.46, hemoglobin 12.1, hematocrit 37.5, platelets 249.  Sodium 

136, potassium 4.1, chloride 106, CO2 24, BUN 22, creatinine 1.04.  



IMPRESSION

1. Acute anemia, suspect gastrointestinal bleeding due to positive stool 

occult blood.

2. Candida esophagitis and gastritis as noted on 

esophagogastroduodenoscopy.

3. Resolving colitis, possibly ischemic.

4. Pseudomonas aeruginosa urinary tract infection.

5. Suspected right upper lobe pneumonia.

6. Acute-on-chronic kidney disease.

7. Iron deficiency anemia.

8. Acute-on-chronic diastolic heart failure.

9. Chest pain, resolved.

10. History of deep vein thrombosis/pulmonary embolism.

11. Reported history of atrial fibrillation.

12. Hypertension.



RECOMMENDATIONS:  Patient ruled out for myocardial infarction with serial 

cardiac biomarkers. Continue current cardiac medications. Warfarin is on 

hold given downtrending hemoglobin and hematocrit.  Currently undergoing GI 

evaluation for possible source of bleeding. Await biopsy results. 

Antibiotics per primary service.  Patient's blood pressure is borderline 

for age.  Continue current antihypertensive therapy.  Watch creatinine 

closely.



Thank you for this consult. We will continue to follow.









DD:  05/09/2018 15:26

DT:  05/09/2018 15:42

Job#:  [id number]

## 2018-05-09 NOTE — DIAGNOSTIC IMAGING REPORT
CHEST 2 VIEWS,    



Technique: CHEST 2 VIEWS

Comparison: 5/7/2018

Clinical history:  Pneumonia 



DISCUSSION:

Stable cardiomediastinal silhouette. Persistent patchy bilateral and nodular

opacities. Small bilateral effusions with bibasilar atelectasis or

consolidation. Hiatal hernia.



IMPRESSION:

Stable findings of pneumonia.



Signed by: Dr Arelis Kilpatrick MD on 5/9/2018 6:37 AM

## 2018-05-10 VITALS — SYSTOLIC BLOOD PRESSURE: 105 MMHG | DIASTOLIC BLOOD PRESSURE: 60 MMHG

## 2018-05-10 VITALS — DIASTOLIC BLOOD PRESSURE: 47 MMHG | SYSTOLIC BLOOD PRESSURE: 121 MMHG

## 2018-05-10 VITALS — DIASTOLIC BLOOD PRESSURE: 63 MMHG | SYSTOLIC BLOOD PRESSURE: 135 MMHG

## 2018-05-10 VITALS — DIASTOLIC BLOOD PRESSURE: 64 MMHG | SYSTOLIC BLOOD PRESSURE: 141 MMHG

## 2018-05-10 VITALS — SYSTOLIC BLOOD PRESSURE: 158 MMHG | DIASTOLIC BLOOD PRESSURE: 69 MMHG

## 2018-05-10 VITALS — SYSTOLIC BLOOD PRESSURE: 141 MMHG | DIASTOLIC BLOOD PRESSURE: 64 MMHG

## 2018-05-10 VITALS — DIASTOLIC BLOOD PRESSURE: 59 MMHG | SYSTOLIC BLOOD PRESSURE: 122 MMHG

## 2018-05-10 LAB
ANION GAP SERPL CALC-SCNC: 11.1 MMOL/L (ref 8–16)
ANISOCYTOSIS BLD QL SMEAR: (no result)
BASOPHILS # BLD AUTO: 0 10*3/UL (ref 0–0.1)
BASOPHILS NFR BLD AUTO: 0.3 % (ref 0–1)
BUN SERPL-MCNC: 18 MG/DL (ref 7–26)
BUN/CREAT SERPL: 19 (ref 6–25)
CALCIUM SERPL-MCNC: 8.8 MG/DL (ref 8.4–10.2)
CHLORIDE SERPL-SCNC: 107 MMOL/L (ref 98–107)
CO2 SERPL-SCNC: 22 MMOL/L (ref 22–29)
DEPRECATED NEUTROPHILS # BLD AUTO: 5 10*3/UL (ref 2.1–6.9)
EGFRCR SERPLBLD CKD-EPI 2021: 56 ML/MIN (ref 60–?)
EOSINOPHIL # BLD AUTO: 0.2 10*3/UL (ref 0–0.4)
EOSINOPHIL NFR BLD AUTO: 3.2 % (ref 0–6)
ERYTHROCYTE [DISTWIDTH] IN CORD BLOOD: 24.2 % (ref 11.7–14.4)
GLUCOSE SERPLBLD-MCNC: 103 MG/DL (ref 74–118)
HCT VFR BLD AUTO: 36.2 % (ref 34.2–44.1)
HGB BLD-MCNC: 11.6 G/DL (ref 12–16)
LYMPHOCYTES # BLD: 1.3 10*3/UL (ref 1–3.2)
LYMPHOCYTES NFR BLD AUTO: 18.5 % (ref 18–39.1)
MCH RBC QN AUTO: 26.4 PG (ref 28–32)
MCHC RBC AUTO-ENTMCNC: 32 G/DL (ref 31–35)
MCV RBC AUTO: 82.5 FL (ref 81–99)
MONOCYTES # BLD AUTO: 0.7 10*3/UL (ref 0.2–0.8)
MONOCYTES NFR BLD AUTO: 9.1 % (ref 4.4–11.3)
NEUTS SEG NFR BLD AUTO: 68.3 % (ref 38.7–80)
PLAT MORPH BLD: (no result)
PLATELET # BLD AUTO: 264 X10E3/UL (ref 140–360)
PLATELET # BLD EST: ADEQUATE 10*3/UL
POIKILOCYTOSIS BLD QL SMEAR: SLIGHT
POTASSIUM SERPL-SCNC: 4.1 MMOL/L (ref 3.5–5.1)
RBC # BLD AUTO: 4.39 X10E6/UL (ref 3.6–5.1)
RBC MORPH BLD: (no result)
SODIUM SERPL-SCNC: 136 MMOL/L (ref 136–145)

## 2018-05-10 RX ADMIN — CHOLESTYRAMINE LIGHT SCH GM: 4 POWDER, FOR SUSPENSION ORAL at 16:21

## 2018-05-10 RX ADMIN — METOPROLOL SUCCINATE SCH MG: 50 TABLET, EXTENDED RELEASE ORAL at 16:21

## 2018-05-10 RX ADMIN — Medication SCH ML: at 02:05

## 2018-05-10 RX ADMIN — ASPIRIN 81 MG CHEWABLE TABLET SCH MG: 81 TABLET CHEWABLE at 08:29

## 2018-05-10 RX ADMIN — TAZOBACTAM SODIUM AND PIPERACILLIN SODIUM SCH MLS/HR: 375; 3 INJECTION, SOLUTION INTRAVENOUS at 21:09

## 2018-05-10 RX ADMIN — CHOLESTYRAMINE LIGHT SCH GM: 4 POWDER, FOR SUSPENSION ORAL at 08:29

## 2018-05-10 RX ADMIN — PANTOPRAZOLE SODIUM SCH MG: 40 TABLET, DELAYED RELEASE ORAL at 08:29

## 2018-05-10 RX ADMIN — MEGESTROL ACETATE SCH MG: 40 SUSPENSION ORAL at 08:29

## 2018-05-10 RX ADMIN — Medication SCH ML: at 20:38

## 2018-05-10 RX ADMIN — METOPROLOL SUCCINATE SCH MG: 50 TABLET, EXTENDED RELEASE ORAL at 08:30

## 2018-05-10 RX ADMIN — FUROSEMIDE SCH MG: 20 TABLET ORAL at 08:29

## 2018-05-10 RX ADMIN — PRAMIPEXOLE DIHYDROCHLORIDE SCH MG: 0.25 TABLET ORAL at 21:08

## 2018-05-10 RX ADMIN — TAZOBACTAM SODIUM AND PIPERACILLIN SODIUM SCH MLS/HR: 375; 3 INJECTION, SOLUTION INTRAVENOUS at 13:22

## 2018-05-10 RX ADMIN — LOSARTAN POTASSIUM SCH MG: 100 TABLET, FILM COATED ORAL at 08:30

## 2018-05-10 RX ADMIN — ALLOPURINOL SCH MG: 100 TABLET ORAL at 08:29

## 2018-05-10 RX ADMIN — AMLODIPINE BESYLATE SCH MG: 10 TABLET ORAL at 08:30

## 2018-05-10 RX ADMIN — Medication SCH ML: at 08:35

## 2018-05-10 RX ADMIN — LEVOTHYROXINE SODIUM SCH MCG: 50 TABLET ORAL at 05:05

## 2018-05-10 RX ADMIN — TAZOBACTAM SODIUM AND PIPERACILLIN SODIUM SCH MLS/HR: 375; 3 INJECTION, SOLUTION INTRAVENOUS at 05:04

## 2018-05-10 RX ADMIN — Medication SCH ML: at 13:00

## 2018-05-10 NOTE — PROGRESS NOTE
DATE:  May 10, 2018 



CARDIOLOGY PROGRESS NOTE



SUBJECTIVE:  Patient denies chest pain or shortness of breath.  She is 

still coughing.



OBJECTIVE

VITAL SIGNS:  Temperature 97.8 degrees, pulse 82, respiratory rate 18, 

blood pressure 121/47,  oxygen saturation 100% on nasal cannula.

GENERAL:  Awake, alert, cachectic, elderly, frail woman in no acute 

distress.

LUNGS:  Clear to auscultation bilaterally.  No wheezes or crackles. 

CARDIOVASCULAR:  Normal rate, regular rhythm.  No murmur.  Normal S1 and 

S2. 

ABDOMEN:  Soft, nontender. 

EXTREMITIES:  No edema. 



CARDIAC MEDICATIONS

1. Losartan 100 mg p.o. daily.

2. Metoprolol succinate 100 mg p.o. b.i.d. 

3. Amlodipine 10 mg p.o. daily.

4. Furosemide 20 mg p.o. daily. 

5. Aspirin 81 mg p.o. daily.

6. Levothyroxine 50 mcg p.o. daily.



LABS:  WBC 7.24, hemoglobin 11.6, hematocrit 36.2, platelets 264.  Sodium 

136, potassium 4.1, chloride 107, CO2 22, BUN 18, creatinine 0.95. 



IMPRESSION

1. Acute anemia, suspect gastrointestinal bleeding due to positive stool 

occult blood.

2. Candida esophagitis and gastritis as noted on 

esophagogastroduodenoscopy.

3. Resolving colitis, possibly ischemic.

4. Pseudomonas aeruginosa urinary tract infection.

5. Suspected right upper lobe pneumonia.

6. Acute-on-chronic kidney disease.

7. Iron deficiency anemia.

8. Acute-on-chronic diastolic heart failure.

9. Chest pain, resolved.

10. History of deep vein thrombosis/pulmonary embolism.

11. Reported history of atrial fibrillation.

12. Hypertension.



RECOMMENDATIONS:  Patient ruled out for myocardial infarction with serial 

cardiac biomarkers. Continue current cardiac medications. Warfarin is on 

hold due to continued worsening of hemoglobin and hematocrit.  Plan to 

resume once H and H have been stable and cleared from GI standpoint.  

Antibiotics per primary service.  Blood pressure is acceptable for age.  

Continue current antihypertensive therapy.  



Thank you for this consult.  We will continue to follow.









DD:  05/10/2018 14:37

DT:  05/10/2018 14:38

Job#:  [id number]

## 2018-05-11 VITALS — DIASTOLIC BLOOD PRESSURE: 50 MMHG | SYSTOLIC BLOOD PRESSURE: 121 MMHG

## 2018-05-11 VITALS — DIASTOLIC BLOOD PRESSURE: 65 MMHG | SYSTOLIC BLOOD PRESSURE: 142 MMHG

## 2018-05-11 VITALS — DIASTOLIC BLOOD PRESSURE: 63 MMHG | SYSTOLIC BLOOD PRESSURE: 138 MMHG

## 2018-05-11 VITALS — SYSTOLIC BLOOD PRESSURE: 105 MMHG | DIASTOLIC BLOOD PRESSURE: 52 MMHG

## 2018-05-11 VITALS — DIASTOLIC BLOOD PRESSURE: 64 MMHG | SYSTOLIC BLOOD PRESSURE: 114 MMHG

## 2018-05-11 VITALS — DIASTOLIC BLOOD PRESSURE: 51 MMHG | SYSTOLIC BLOOD PRESSURE: 109 MMHG

## 2018-05-11 VITALS — DIASTOLIC BLOOD PRESSURE: 52 MMHG | SYSTOLIC BLOOD PRESSURE: 105 MMHG

## 2018-05-11 RX ADMIN — METOPROLOL SUCCINATE SCH MG: 50 TABLET, EXTENDED RELEASE ORAL at 10:19

## 2018-05-11 RX ADMIN — ASPIRIN 81 MG CHEWABLE TABLET SCH MG: 81 TABLET CHEWABLE at 10:18

## 2018-05-11 RX ADMIN — AMLODIPINE BESYLATE SCH MG: 10 TABLET ORAL at 10:19

## 2018-05-11 RX ADMIN — PANTOPRAZOLE SODIUM SCH MG: 40 TABLET, DELAYED RELEASE ORAL at 10:18

## 2018-05-11 RX ADMIN — LEVOTHYROXINE SODIUM SCH MCG: 50 TABLET ORAL at 05:17

## 2018-05-11 RX ADMIN — ALLOPURINOL SCH MG: 100 TABLET ORAL at 10:19

## 2018-05-11 RX ADMIN — Medication SCH ML: at 06:58

## 2018-05-11 RX ADMIN — TAZOBACTAM SODIUM AND PIPERACILLIN SODIUM SCH MLS/HR: 375; 3 INJECTION, SOLUTION INTRAVENOUS at 18:22

## 2018-05-11 RX ADMIN — HYDROCODONE BITARTRATE AND ACETAMINOPHEN PRN EA: 5; 325 TABLET ORAL at 21:01

## 2018-05-11 RX ADMIN — Medication SCH ML: at 19:25

## 2018-05-11 RX ADMIN — MEGESTROL ACETATE SCH MG: 40 SUSPENSION ORAL at 10:19

## 2018-05-11 RX ADMIN — LOSARTAN POTASSIUM SCH MG: 100 TABLET, FILM COATED ORAL at 10:18

## 2018-05-11 RX ADMIN — TAZOBACTAM SODIUM AND PIPERACILLIN SODIUM SCH MLS/HR: 375; 3 INJECTION, SOLUTION INTRAVENOUS at 05:17

## 2018-05-11 RX ADMIN — CHOLESTYRAMINE LIGHT SCH GM: 4 POWDER, FOR SUSPENSION ORAL at 10:19

## 2018-05-11 RX ADMIN — METOPROLOL SUCCINATE SCH MG: 50 TABLET, EXTENDED RELEASE ORAL at 17:00

## 2018-05-11 RX ADMIN — Medication SCH ML: at 01:22

## 2018-05-11 RX ADMIN — CHOLESTYRAMINE LIGHT SCH GM: 4 POWDER, FOR SUSPENSION ORAL at 18:20

## 2018-05-11 RX ADMIN — FUROSEMIDE SCH MG: 20 TABLET ORAL at 10:18

## 2018-05-11 RX ADMIN — PRAMIPEXOLE DIHYDROCHLORIDE SCH MG: 0.25 TABLET ORAL at 20:59

## 2018-05-11 RX ADMIN — Medication SCH ML: at 13:00

## 2018-05-11 RX ADMIN — TAZOBACTAM SODIUM AND PIPERACILLIN SODIUM SCH MLS/HR: 375; 3 INJECTION, SOLUTION INTRAVENOUS at 14:26

## 2018-05-11 NOTE — PROGRESS NOTE
DATE:  May 11, 2018 



CARDIOLOGY PROGRESS NOTE



SUBJECTIVE:  Patient denies chest pain or shortness of breath. She is still 

coughing.



OBJECTIVE

VITAL SIGNS:  Temperature 96.6 degrees, pulse 91, respiratory rate 17, 

blood pressure 142/65, oxygen saturation 96% on 2 liters nasal cannula.

GENERAL:  Elderly, cachectic, frail woman in no acute distress, awake and 

alert.

LUNGS:  Clear to auscultation bilaterally. No wheezes or crackles. 

CARDIOVASCULAR:  Normal rate, regular rhythm. No murmur. Normal S1 and S2. 

ABDOMEN:  Soft, nontender. 

EXTREMITIES:  No edema. 



CARDIAC MEDICATIONS

1. Metoprolol succinate 100 mg p.o. b.i.d.

2. Amlodipine 10 mg p.o. daily.

3. Losartan 100 mg p.o. daily.

4. Furosemide 20 mg p.o. daily.

5. Aspirin 81 mg p.o. daily.

6. Levothyroxine 50 mcg p.o. daily.



LABS:  None today.



IMPRESSION

1. Acute anemia, suspect gastrointestinal bleeding due to positive stool 

occult blood.

2. Candida esophagitis and gastritis as noted on 

esophagogastroduodenoscopy.

3. Resolving colitis, possibly ischemic.

4. Pseudomonas aeruginosa urinary tract infection.

5. Suspected right upper lobe pneumonia.

6. Acute-on-chronic kidney disease.

7. Iron deficiency anemia.

8. Acute-on-chronic diastolic heart failure.

9. Chest pain, resolved.

10. History of deep vein thrombosis/pulmonary embolism.

11. Reported history of atrial fibrillation.

12. Hypertension.



RECOMMENDATIONS:  The patient ruled out for a myocardial infarction with 

serial cardiac biomarkers. Continue current cardiac medications. Warfarin 

was held due to worsening hemoglobin and hematocrit. Plan to resume once 

hemoglobin and hematocrit have been stable and she is cleared from GI 

standpoint. Antibiotics per primary service. Blood pressure is acceptable 

for age. We will continue current antihypertensive therapy. Give 1 dose of 

IV Lasix today given she has 1+ lower extremity edema today.



Thank you for this consult. We will continue to follow.













DD:  05/11/2018 16:38

DT:  05/11/2018 16:45

Job#:  [id number]

## 2018-05-12 VITALS — SYSTOLIC BLOOD PRESSURE: 156 MMHG | DIASTOLIC BLOOD PRESSURE: 70 MMHG

## 2018-05-12 VITALS — DIASTOLIC BLOOD PRESSURE: 65 MMHG | SYSTOLIC BLOOD PRESSURE: 133 MMHG

## 2018-05-12 VITALS — SYSTOLIC BLOOD PRESSURE: 127 MMHG | DIASTOLIC BLOOD PRESSURE: 70 MMHG

## 2018-05-12 VITALS — SYSTOLIC BLOOD PRESSURE: 122 MMHG | DIASTOLIC BLOOD PRESSURE: 56 MMHG

## 2018-05-12 VITALS — SYSTOLIC BLOOD PRESSURE: 137 MMHG | DIASTOLIC BLOOD PRESSURE: 63 MMHG

## 2018-05-12 VITALS — DIASTOLIC BLOOD PRESSURE: 48 MMHG | SYSTOLIC BLOOD PRESSURE: 95 MMHG

## 2018-05-12 VITALS — DIASTOLIC BLOOD PRESSURE: 56 MMHG | SYSTOLIC BLOOD PRESSURE: 122 MMHG

## 2018-05-12 RX ADMIN — FUROSEMIDE SCH MG: 20 TABLET ORAL at 09:00

## 2018-05-12 RX ADMIN — Medication SCH ML: at 13:00

## 2018-05-12 RX ADMIN — ASPIRIN 81 MG CHEWABLE TABLET SCH MG: 81 TABLET CHEWABLE at 09:00

## 2018-05-12 RX ADMIN — HYDROCODONE BITARTRATE AND ACETAMINOPHEN PRN EA: 5; 325 TABLET ORAL at 20:52

## 2018-05-12 RX ADMIN — PANTOPRAZOLE SODIUM SCH MG: 40 TABLET, DELAYED RELEASE ORAL at 07:30

## 2018-05-12 RX ADMIN — METOPROLOL SUCCINATE SCH MG: 50 TABLET, EXTENDED RELEASE ORAL at 09:00

## 2018-05-12 RX ADMIN — ALLOPURINOL SCH MG: 100 TABLET ORAL at 09:00

## 2018-05-12 RX ADMIN — TAZOBACTAM SODIUM AND PIPERACILLIN SODIUM SCH MLS/HR: 375; 3 INJECTION, SOLUTION INTRAVENOUS at 05:51

## 2018-05-12 RX ADMIN — MEGESTROL ACETATE SCH MG: 40 SUSPENSION ORAL at 09:00

## 2018-05-12 RX ADMIN — LEVOTHYROXINE SODIUM SCH MCG: 50 TABLET ORAL at 05:51

## 2018-05-12 RX ADMIN — PRAMIPEXOLE DIHYDROCHLORIDE SCH MG: 0.25 TABLET ORAL at 20:52

## 2018-05-12 RX ADMIN — Medication SCH ML: at 01:10

## 2018-05-12 RX ADMIN — LOSARTAN POTASSIUM SCH MG: 100 TABLET, FILM COATED ORAL at 09:00

## 2018-05-12 RX ADMIN — CHOLESTYRAMINE LIGHT SCH GM: 4 POWDER, FOR SUSPENSION ORAL at 09:00

## 2018-05-12 RX ADMIN — Medication SCH ML: at 19:03

## 2018-05-12 RX ADMIN — METOPROLOL SUCCINATE SCH MG: 50 TABLET, EXTENDED RELEASE ORAL at 17:00

## 2018-05-12 RX ADMIN — AMLODIPINE BESYLATE SCH MG: 10 TABLET ORAL at 09:00

## 2018-05-12 RX ADMIN — Medication SCH ML: at 06:27

## 2018-05-12 RX ADMIN — TAZOBACTAM SODIUM AND PIPERACILLIN SODIUM SCH MLS/HR: 375; 3 INJECTION, SOLUTION INTRAVENOUS at 20:52

## 2018-05-12 RX ADMIN — CHOLESTYRAMINE LIGHT SCH GM: 4 POWDER, FOR SUSPENSION ORAL at 17:00

## 2018-05-12 RX ADMIN — TAZOBACTAM SODIUM AND PIPERACILLIN SODIUM SCH MLS/HR: 375; 3 INJECTION, SOLUTION INTRAVENOUS at 13:08

## 2018-05-12 NOTE — PROGRESS NOTE
DATE:  May 12, 2018 



CARDIOLOGY PROGRESS NOTE



SUBJECTIVE:  No complaints today.



OBJECTIVE  

VITALS:  Temperature 97.3, heart rate 88, respiratory rate 16, blood 

pressure 127/70, O2 sat 96% on 2 L per minute nasal cannula. 

GENERAL:  No acute distress. 

CHEST:  Scattered rhonchi. 

CARDIOVASCULAR:  Regular rate and rhythm.  Normal S1 and S2.  No S3.  No 

S4. 

ABDOMEN:  Soft. 

EXTREMITIES:  No edema.  



CARDIOVASCULAR MEDICATIONS 

1.  Metoprolol succinate 100 mg b.i.d. 

2.  Amlodipine 10 mg daily.  

3.  Losartan 100 mg daily.  

4.  Furosemide 20 daily.  

5.  Aspirin 81 mg daily.  



STUDIES:  Reviewed.  No labs for today.



ASSESSMENT

1.  Acute anemia:  Suspected gastrointestinal bleed in the setting of 

positive stool occult blood.

2.  Candida esophagitis and gastritis.

3.  Resolved colitis, possibly ischemic.

4.  Pseudomonas aeruginosa urinary tract infection. 

5.  Suspected right upper lobe pneumonia.

6.  Acute-on-chronic kidney disease.

7.  Iron deficiency anemia.

8.  Acute-on-chronic diastolic heart failure with improved volume status.

9.  History of deep venous thrombosis/pulmonary embolism. 

10. Reported history of atrial fibrillation.

11. Hypertension.

12. Resolved chest pain.



PLAN:  No AMI.  Tolerating current cardiovascular medications.  Given 

recent anemia, anticoagulation on hold.  Await GI input.  If okay to 

resume, will re-challenge with warfarin.  Monitor H and H closely.  Volume 

status overall better.











DD:  05/12/2018 13:17

DT:  05/12/2018 13:39

Job#:  [id number]

## 2018-05-13 VITALS — SYSTOLIC BLOOD PRESSURE: 154 MMHG | DIASTOLIC BLOOD PRESSURE: 69 MMHG

## 2018-05-13 VITALS — SYSTOLIC BLOOD PRESSURE: 154 MMHG | DIASTOLIC BLOOD PRESSURE: 67 MMHG

## 2018-05-13 VITALS — DIASTOLIC BLOOD PRESSURE: 72 MMHG | SYSTOLIC BLOOD PRESSURE: 162 MMHG

## 2018-05-13 VITALS — SYSTOLIC BLOOD PRESSURE: 153 MMHG | DIASTOLIC BLOOD PRESSURE: 67 MMHG

## 2018-05-13 VITALS — DIASTOLIC BLOOD PRESSURE: 64 MMHG | SYSTOLIC BLOOD PRESSURE: 147 MMHG

## 2018-05-13 VITALS — DIASTOLIC BLOOD PRESSURE: 70 MMHG | SYSTOLIC BLOOD PRESSURE: 159 MMHG

## 2018-05-13 LAB
BASOPHILS # BLD AUTO: 0.1 10*3/UL (ref 0–0.1)
BASOPHILS NFR BLD AUTO: 0.7 % (ref 0–1)
DEPRECATED NEUTROPHILS # BLD AUTO: 5.2 10*3/UL (ref 2.1–6.9)
EOSINOPHIL # BLD AUTO: 0.3 10*3/UL (ref 0–0.4)
EOSINOPHIL NFR BLD AUTO: 3.3 % (ref 0–6)
ERYTHROCYTE [DISTWIDTH] IN CORD BLOOD: 25.1 % (ref 11.7–14.4)
HCT VFR BLD AUTO: 41.4 % (ref 34.2–44.1)
HGB BLD-MCNC: 12.9 G/DL (ref 12–16)
LYMPHOCYTES # BLD: 1.5 10*3/UL (ref 1–3.2)
LYMPHOCYTES NFR BLD AUTO: 19 % (ref 18–39.1)
MCH RBC QN AUTO: 26.7 PG (ref 28–32)
MCHC RBC AUTO-ENTMCNC: 31.2 G/DL (ref 31–35)
MCV RBC AUTO: 85.5 FL (ref 81–99)
MONOCYTES # BLD AUTO: 0.6 10*3/UL (ref 0.2–0.8)
MONOCYTES NFR BLD AUTO: 7.9 % (ref 4.4–11.3)
NEUTS SEG NFR BLD AUTO: 67.9 % (ref 38.7–80)
PLATELET # BLD AUTO: 254 X10E3/UL (ref 140–360)
RBC # BLD AUTO: 4.84 X10E6/UL (ref 3.6–5.1)

## 2018-05-13 RX ADMIN — PRAMIPEXOLE DIHYDROCHLORIDE SCH MG: 0.25 TABLET ORAL at 21:25

## 2018-05-13 RX ADMIN — CHOLESTYRAMINE LIGHT SCH GM: 4 POWDER, FOR SUSPENSION ORAL at 17:00

## 2018-05-13 RX ADMIN — LOSARTAN POTASSIUM SCH MG: 100 TABLET, FILM COATED ORAL at 09:00

## 2018-05-13 RX ADMIN — AMLODIPINE BESYLATE SCH MG: 10 TABLET ORAL at 09:00

## 2018-05-13 RX ADMIN — FUROSEMIDE SCH MG: 20 TABLET ORAL at 09:00

## 2018-05-13 RX ADMIN — CHOLESTYRAMINE LIGHT SCH GM: 4 POWDER, FOR SUSPENSION ORAL at 09:00

## 2018-05-13 RX ADMIN — ALLOPURINOL SCH MG: 100 TABLET ORAL at 09:00

## 2018-05-13 RX ADMIN — PANTOPRAZOLE SODIUM SCH MG: 40 TABLET, DELAYED RELEASE ORAL at 07:30

## 2018-05-13 RX ADMIN — METOPROLOL SUCCINATE SCH MG: 50 TABLET, EXTENDED RELEASE ORAL at 17:00

## 2018-05-13 RX ADMIN — TAZOBACTAM SODIUM AND PIPERACILLIN SODIUM SCH MLS/HR: 375; 3 INJECTION, SOLUTION INTRAVENOUS at 05:26

## 2018-05-13 RX ADMIN — TAZOBACTAM SODIUM AND PIPERACILLIN SODIUM SCH MLS/HR: 375; 3 INJECTION, SOLUTION INTRAVENOUS at 13:09

## 2018-05-13 RX ADMIN — TAZOBACTAM SODIUM AND PIPERACILLIN SODIUM SCH MLS/HR: 375; 3 INJECTION, SOLUTION INTRAVENOUS at 21:25

## 2018-05-13 RX ADMIN — ASPIRIN 81 MG CHEWABLE TABLET SCH MG: 81 TABLET CHEWABLE at 09:00

## 2018-05-13 RX ADMIN — MEGESTROL ACETATE SCH MG: 40 SUSPENSION ORAL at 09:00

## 2018-05-13 RX ADMIN — LEVOTHYROXINE SODIUM SCH MCG: 50 TABLET ORAL at 05:26

## 2018-05-13 RX ADMIN — Medication SCH ML: at 02:50

## 2018-05-13 RX ADMIN — METOPROLOL SUCCINATE SCH MG: 50 TABLET, EXTENDED RELEASE ORAL at 09:00

## 2018-05-13 RX ADMIN — Medication SCH ML: at 12:31

## 2018-05-13 RX ADMIN — Medication SCH ML: at 19:12

## 2018-05-13 RX ADMIN — Medication SCH ML: at 06:55

## 2018-05-13 NOTE — PROGRESS NOTE
DATE:  May 13, 2018 



CARDIOLOGY PROGRESS NOTE



SUBJECTIVE:  No complaints.



OBJECTIVE:  

VITAL SIGNS:  Temperature 97.6, heart rate 87, respiratory rate 18, blood 

pressure 147/64, O2 sat 96% on room air. 

GENERAL:  No acute distress. 

CHEST:  Clear to auscultation. 

CARDIOVASCULAR:  Regular rate and rhythm.  Normal S1 and S2. 

ABDOMEN:  Soft.

EXTREMITIES:  No edema.



CARDIOVASCULAR MEDICATIONS:  

1. Metoprolol succinate 100 mg b.i.d. 

2. Amlodipine 10 mg daily.  

3. Losartan 100 mg daily.  

4. Furosemide 20 mg daily.  

5. Aspirin 81 mg daily.



LABORATORY STUDIES:  Reviewed.  White blood cells 7.6, hemoglobin 12.9, 

platelets 254.  INR 1.2; that is from yesterday.  Last creatinine from May 

10, 2018, was 0.95.



ASSESSMENT:  

1. Acute anemia, suspected gastrointestinal bleed in setting of positive 

occult blood in stools.

2. Candida esophagitis and gastritis.

3. Resolved colitis, possibly ischemic.

4. Pseudomonas aeruginosa urinary tract infection.

5. Suspected right upper lobe pneumonia.

6. Acute on chronic kidney disease.

7. Iron deficiency anemia.

8. Acute on chronic diastolic heart failure, with now improved volume 

status.

9. History of deep vein thrombosis and pulmonary embolism.

10. Reported history of atrial fibrillation.

11. Hypertension.

12. Resolved chest pain.  



RECOMMENDATIONS:   Continue current cardiovascular medications.  Given 

recent anemia and above-listed GI issues, await GI input regarding resuming 

anticoagulation.  If okay, will rechallenge with warfarin.  Continue to 

monitor H\T\H.  Stable volume status.  Continue antihypertensives.  Blood 

pressure remains mildly elevated.  However, will monitor over the following 

couple of days prior to further adjustment in medication doses.











DD:  05/13/2018 13:56

DT:  05/13/2018 15:09

Job#:  [id number]

## 2018-05-14 VITALS — DIASTOLIC BLOOD PRESSURE: 71 MMHG | SYSTOLIC BLOOD PRESSURE: 160 MMHG

## 2018-05-14 VITALS — SYSTOLIC BLOOD PRESSURE: 174 MMHG | DIASTOLIC BLOOD PRESSURE: 75 MMHG

## 2018-05-14 VITALS — DIASTOLIC BLOOD PRESSURE: 55 MMHG | SYSTOLIC BLOOD PRESSURE: 165 MMHG

## 2018-05-14 VITALS — DIASTOLIC BLOOD PRESSURE: 65 MMHG | SYSTOLIC BLOOD PRESSURE: 143 MMHG

## 2018-05-14 VITALS — DIASTOLIC BLOOD PRESSURE: 62 MMHG | SYSTOLIC BLOOD PRESSURE: 130 MMHG

## 2018-05-14 VITALS — SYSTOLIC BLOOD PRESSURE: 165 MMHG | DIASTOLIC BLOOD PRESSURE: 55 MMHG

## 2018-05-14 LAB
ANION GAP SERPL CALC-SCNC: 10 MMOL/L (ref 8–16)
BASOPHILS # BLD AUTO: 0 10*3/UL (ref 0–0.1)
BASOPHILS NFR BLD AUTO: 0.6 % (ref 0–1)
BUN SERPL-MCNC: 15 MG/DL (ref 7–26)
BUN/CREAT SERPL: 18 (ref 6–25)
CALCIUM SERPL-MCNC: 8.9 MG/DL (ref 8.4–10.2)
CHLORIDE SERPL-SCNC: 104 MMOL/L (ref 98–107)
CO2 SERPL-SCNC: 24 MMOL/L (ref 22–29)
DEPRECATED NEUTROPHILS # BLD AUTO: 4 10*3/UL (ref 2.1–6.9)
EGFRCR SERPLBLD CKD-EPI 2021: > 60 ML/MIN (ref 60–?)
EOSINOPHIL # BLD AUTO: 0.3 10*3/UL (ref 0–0.4)
EOSINOPHIL NFR BLD AUTO: 5 % (ref 0–6)
ERYTHROCYTE [DISTWIDTH] IN CORD BLOOD: 25.1 % (ref 11.7–14.4)
GLUCOSE SERPLBLD-MCNC: 76 MG/DL (ref 74–118)
HCT VFR BLD AUTO: 38.3 % (ref 34.2–44.1)
HGB BLD-MCNC: 11.9 G/DL (ref 12–16)
LYMPHOCYTES # BLD: 1.2 10*3/UL (ref 1–3.2)
LYMPHOCYTES NFR BLD AUTO: 19.1 % (ref 18–39.1)
MCH RBC QN AUTO: 25.9 PG (ref 28–32)
MCHC RBC AUTO-ENTMCNC: 31.1 G/DL (ref 31–35)
MCV RBC AUTO: 83.3 FL (ref 81–99)
MONOCYTES # BLD AUTO: 0.6 10*3/UL (ref 0.2–0.8)
MONOCYTES NFR BLD AUTO: 9.6 % (ref 4.4–11.3)
NEUTS SEG NFR BLD AUTO: 64.6 % (ref 38.7–80)
PLATELET # BLD AUTO: 257 X10E3/UL (ref 140–360)
POTASSIUM SERPL-SCNC: 5 MMOL/L (ref 3.5–5.1)
RBC # BLD AUTO: 4.6 X10E6/UL (ref 3.6–5.1)
SODIUM SERPL-SCNC: 133 MMOL/L (ref 136–145)

## 2018-05-14 RX ADMIN — AMLODIPINE BESYLATE SCH MG: 10 TABLET ORAL at 09:08

## 2018-05-14 RX ADMIN — FUROSEMIDE SCH MG: 20 TABLET ORAL at 09:08

## 2018-05-14 RX ADMIN — LEVOTHYROXINE SODIUM SCH MCG: 50 TABLET ORAL at 05:19

## 2018-05-14 RX ADMIN — Medication SCH ML: at 02:05

## 2018-05-14 RX ADMIN — ASPIRIN 81 MG CHEWABLE TABLET SCH MG: 81 TABLET CHEWABLE at 09:07

## 2018-05-14 RX ADMIN — LOSARTAN POTASSIUM SCH MG: 100 TABLET, FILM COATED ORAL at 09:07

## 2018-05-14 RX ADMIN — CHOLESTYRAMINE LIGHT SCH GM: 4 POWDER, FOR SUSPENSION ORAL at 17:00

## 2018-05-14 RX ADMIN — METOPROLOL SUCCINATE SCH MG: 50 TABLET, EXTENDED RELEASE ORAL at 09:08

## 2018-05-14 RX ADMIN — MEGESTROL ACETATE SCH MG: 40 SUSPENSION ORAL at 09:08

## 2018-05-14 RX ADMIN — Medication SCH ML: at 12:57

## 2018-05-14 RX ADMIN — TAZOBACTAM SODIUM AND PIPERACILLIN SODIUM SCH MLS/HR: 375; 3 INJECTION, SOLUTION INTRAVENOUS at 04:59

## 2018-05-14 RX ADMIN — METOPROLOL SUCCINATE SCH MG: 50 TABLET, EXTENDED RELEASE ORAL at 17:45

## 2018-05-14 RX ADMIN — PANTOPRAZOLE SODIUM SCH MG: 40 TABLET, DELAYED RELEASE ORAL at 09:07

## 2018-05-14 RX ADMIN — CHOLESTYRAMINE LIGHT SCH GM: 4 POWDER, FOR SUSPENSION ORAL at 09:08

## 2018-05-14 RX ADMIN — ALLOPURINOL SCH MG: 100 TABLET ORAL at 09:08

## 2018-05-14 RX ADMIN — TAZOBACTAM SODIUM AND PIPERACILLIN SODIUM SCH MLS/HR: 375; 3 INJECTION, SOLUTION INTRAVENOUS at 13:37

## 2018-05-14 RX ADMIN — Medication SCH ML: at 06:36

## 2018-05-14 NOTE — PROGRESS NOTE
DATE:  May 14, 2018 



CARDIOLOGY PROGRESS NOTE



SUBJECTIVE:  The patient denies chest pain or shortness of breath. She is 

being discharged today.



OBJECTIVE

VITAL SIGNS:  Temperature 96.6 degrees, pulse 93, respiratory rate 18, 

blood pressure 143/65, oxygen saturation 97% on room air.

GENERAL:  Elderly, frail, cachectic woman in no acute distress.

LUNGS:  Clear to auscultation bilaterally. No wheezes or crackles. 

CARDIOVASCULAR:  Normal rate, regular rhythm. No murmur. Normal S1 and S2. 

ABDOMEN:  Soft.

EXTREMITIES:  No edema. 



CARDIAC MEDICATIONS

1. Furosemide 20 mg p.o. daily.

2. Metoprolol succinate 100 mg p.o. b.i.d.

3. Amlodipine 10 mg p.o. daily.

4. Losartan 100 mg p.o. daily.

5. Aspirin 81 mg p.o. daily.

6. Levothyroxine 50 mcg p.o. daily.



LABS:  WBC 6.17, hemoglobin 11.9, hematocrit 38.3, platelets 257. Sodium 

133, potassium 5, chloride 104, CO2 24, BUN 15, creatinine 0.83. 



IMPRESSION

1. Acute anemia, suspect gastrointestinal bleed in setting of positive 

stool occult blood.

2. Candida esophagitis and gastritis.

3. Resolved colitis, possibly ischemic.

4. Pseudomonas aeruginosa urinary tract infection.

5. Suspected right upper lobe pneumonia.

6. Acute-on-chronic kidney disease.

7. Iron deficiency anemia.

8. Acute-on-chronic diastolic heart failure with now improved volume 

status.

9. History of deep vein thrombosis/pulmonary embolism. 

10. Reported history of atrial fibrillation.

11. Hypertension.

12. Resolved chest pain.



RECOMMENDATIONS:  Continue current cardiac medications. Given recent anemia 

and positive stool occult blood, await GI input regarding resuming 

anticoagulation. Continue monitoring hemoglobin and hematocrit. Continue 

current cardiac medications otherwise. Blood pressure is labile but has 

been elevated recently. If her blood pressure remains elevated, plan to add 

further antihypertensive therapies.



Thank you for this consult. We will continue to follow.













DD:  05/14/2018 11:49

DT:  05/14/2018 12:30

Job#:  [id number]

## 2018-05-16 NOTE — OPERATIVE REPORT
DATE OF PROCEDURE:  May 04, 2018 



REFERRING PHYSICIAN:  Dr. Long Cunningham and Dr. Abbie Porter 



PROCEDURE PERFORMED:  Esophagogastroduodenoscopy with biopsies and 

esophageal brushings.  



INDICATIONS FOR ESOPHAGOGASTRODUODENOSCOPY:  Anemia and guaiac positive 

stools and History of acid reflux.



MEDICATION:  Patient was done under MAC.  Please see anesthesiologist's 

note.



PROCEDURE:  With patient in left lateral decubitus position, flexible 

fiberoptic Olympus gastroscope was introduced into the esophagus under 

direct visualization without any difficulty.  There was some scattered 

yellowish-whitish plaques.  Those were brushed.  There were some scattered 

yellowish-whitish plaques in the esophagus and those were brushed and sent 

to stain for candida.  The scope was then advanced with ease into her 

stomach traversing a moderate-size hiatal hernia.  Mucosa overlying the 

antrum and the body revealed some patchy erythema and low-grade to moderate 

edema and biopsies were obtained and sent to stain for H. pylori.  Several 

hyperplastic appearing polyps were noted in the body of the stomach.  Some 

were partially excised with cold biopsy forceps.  Pylorus appeared to be of 

normal contour and shape, was intubated with ease and the scope was 

advanced all the way to the 2nd portion of the duodenum.  The scope was 

then withdrawn slowly.  Mucosa overlying the proximal 2nd portion and the 

duodenal bulb appeared to be within normal limits.  The scope was then 

withdrawn back into the stomach and retroflexed and the previously 

described hiatal hernia was also noted in the retroflexed position.  The 

scope was then straightened out.  Scope was subsequently withdrawn.  

Patient tolerated the procedure well.



IMPRESSION  

1. Rule out candida esophagitis.

2. Moderate-size hiatal hernia.

3. Gastritis biopsied.  Biopsies sent to stain for H. pylori.

4. Gastric polyps, body, some partially excised with cold biopsy 

forceps.



PLAN:  Follow up histology.  Initiate Protonix 40 mg one p.o. q. a.m. a.c.  

Findings do not necessarily explain patient's anemia.  She might benefit 

from a colonoscopy however for the time being she declines to proceed.



  





DD:  05/04/2018 15:33

DT:  05/04/2018 15:57

Job#:  [id number]



cc:MD ABBIE ALEXANDER MD

## 2018-05-24 ENCOUNTER — HOSPITAL ENCOUNTER (INPATIENT)
Dept: HOSPITAL 88 - ER | Age: 83
LOS: 12 days | Discharge: NURSING, REHAB OR LONG-TERM CARE | DRG: 871 | End: 2018-06-05
Attending: INTERNAL MEDICINE | Admitting: INTERNAL MEDICINE
Payer: MEDICARE

## 2018-05-24 VITALS — DIASTOLIC BLOOD PRESSURE: 75 MMHG | SYSTOLIC BLOOD PRESSURE: 150 MMHG

## 2018-05-24 VITALS — SYSTOLIC BLOOD PRESSURE: 144 MMHG | DIASTOLIC BLOOD PRESSURE: 52 MMHG

## 2018-05-24 VITALS — SYSTOLIC BLOOD PRESSURE: 140 MMHG | DIASTOLIC BLOOD PRESSURE: 57 MMHG

## 2018-05-24 VITALS — SYSTOLIC BLOOD PRESSURE: 83 MMHG | DIASTOLIC BLOOD PRESSURE: 52 MMHG

## 2018-05-24 VITALS — DIASTOLIC BLOOD PRESSURE: 48 MMHG | SYSTOLIC BLOOD PRESSURE: 119 MMHG

## 2018-05-24 VITALS — SYSTOLIC BLOOD PRESSURE: 144 MMHG | DIASTOLIC BLOOD PRESSURE: 51 MMHG

## 2018-05-24 VITALS — SYSTOLIC BLOOD PRESSURE: 147 MMHG | DIASTOLIC BLOOD PRESSURE: 76 MMHG

## 2018-05-24 VITALS — SYSTOLIC BLOOD PRESSURE: 138 MMHG | DIASTOLIC BLOOD PRESSURE: 59 MMHG

## 2018-05-24 VITALS — SYSTOLIC BLOOD PRESSURE: 125 MMHG | DIASTOLIC BLOOD PRESSURE: 62 MMHG

## 2018-05-24 VITALS — DIASTOLIC BLOOD PRESSURE: 53 MMHG | SYSTOLIC BLOOD PRESSURE: 87 MMHG

## 2018-05-24 VITALS — DIASTOLIC BLOOD PRESSURE: 96 MMHG | SYSTOLIC BLOOD PRESSURE: 138 MMHG

## 2018-05-24 VITALS — SYSTOLIC BLOOD PRESSURE: 145 MMHG | DIASTOLIC BLOOD PRESSURE: 59 MMHG

## 2018-05-24 VITALS — DIASTOLIC BLOOD PRESSURE: 52 MMHG | SYSTOLIC BLOOD PRESSURE: 87 MMHG

## 2018-05-24 VITALS — SYSTOLIC BLOOD PRESSURE: 131 MMHG | DIASTOLIC BLOOD PRESSURE: 46 MMHG

## 2018-05-24 VITALS — DIASTOLIC BLOOD PRESSURE: 54 MMHG | SYSTOLIC BLOOD PRESSURE: 83 MMHG

## 2018-05-24 VITALS — DIASTOLIC BLOOD PRESSURE: 58 MMHG | SYSTOLIC BLOOD PRESSURE: 84 MMHG

## 2018-05-24 VITALS — SYSTOLIC BLOOD PRESSURE: 138 MMHG | DIASTOLIC BLOOD PRESSURE: 58 MMHG

## 2018-05-24 VITALS — DIASTOLIC BLOOD PRESSURE: 57 MMHG | SYSTOLIC BLOOD PRESSURE: 84 MMHG

## 2018-05-24 VITALS — DIASTOLIC BLOOD PRESSURE: 50 MMHG | SYSTOLIC BLOOD PRESSURE: 92 MMHG

## 2018-05-24 VITALS — DIASTOLIC BLOOD PRESSURE: 53 MMHG | SYSTOLIC BLOOD PRESSURE: 88 MMHG

## 2018-05-24 VITALS — DIASTOLIC BLOOD PRESSURE: 50 MMHG | SYSTOLIC BLOOD PRESSURE: 146 MMHG

## 2018-05-24 VITALS — SYSTOLIC BLOOD PRESSURE: 133 MMHG | DIASTOLIC BLOOD PRESSURE: 60 MMHG

## 2018-05-24 VITALS — SYSTOLIC BLOOD PRESSURE: 151 MMHG | DIASTOLIC BLOOD PRESSURE: 59 MMHG

## 2018-05-24 VITALS — SYSTOLIC BLOOD PRESSURE: 133 MMHG | DIASTOLIC BLOOD PRESSURE: 57 MMHG

## 2018-05-24 VITALS — DIASTOLIC BLOOD PRESSURE: 52 MMHG | SYSTOLIC BLOOD PRESSURE: 134 MMHG

## 2018-05-24 VITALS — DIASTOLIC BLOOD PRESSURE: 56 MMHG | SYSTOLIC BLOOD PRESSURE: 134 MMHG

## 2018-05-24 VITALS — SYSTOLIC BLOOD PRESSURE: 129 MMHG | DIASTOLIC BLOOD PRESSURE: 86 MMHG

## 2018-05-24 VITALS — DIASTOLIC BLOOD PRESSURE: 87 MMHG | SYSTOLIC BLOOD PRESSURE: 131 MMHG

## 2018-05-24 VITALS — DIASTOLIC BLOOD PRESSURE: 57 MMHG | SYSTOLIC BLOOD PRESSURE: 134 MMHG

## 2018-05-24 VITALS — BODY MASS INDEX: 19.9 KG/M2 | HEIGHT: 62 IN | WEIGHT: 108.12 LBS

## 2018-05-24 VITALS — DIASTOLIC BLOOD PRESSURE: 56 MMHG | SYSTOLIC BLOOD PRESSURE: 109 MMHG

## 2018-05-24 DIAGNOSIS — A04.72: ICD-10-CM

## 2018-05-24 DIAGNOSIS — E87.5: ICD-10-CM

## 2018-05-24 DIAGNOSIS — J18.9: ICD-10-CM

## 2018-05-24 DIAGNOSIS — B96.20: ICD-10-CM

## 2018-05-24 DIAGNOSIS — Z88.8: ICD-10-CM

## 2018-05-24 DIAGNOSIS — N18.3: ICD-10-CM

## 2018-05-24 DIAGNOSIS — A41.9: Primary | ICD-10-CM

## 2018-05-24 DIAGNOSIS — E86.0: ICD-10-CM

## 2018-05-24 DIAGNOSIS — N17.9: ICD-10-CM

## 2018-05-24 DIAGNOSIS — A41.89: ICD-10-CM

## 2018-05-24 DIAGNOSIS — E46: ICD-10-CM

## 2018-05-24 DIAGNOSIS — I25.10: ICD-10-CM

## 2018-05-24 DIAGNOSIS — Z88.1: ICD-10-CM

## 2018-05-24 DIAGNOSIS — Z87.891: ICD-10-CM

## 2018-05-24 DIAGNOSIS — K21.9: ICD-10-CM

## 2018-05-24 DIAGNOSIS — J96.22: ICD-10-CM

## 2018-05-24 DIAGNOSIS — Z88.2: ICD-10-CM

## 2018-05-24 DIAGNOSIS — E83.42: ICD-10-CM

## 2018-05-24 DIAGNOSIS — N17.0: ICD-10-CM

## 2018-05-24 DIAGNOSIS — E03.9: ICD-10-CM

## 2018-05-24 DIAGNOSIS — K57.90: ICD-10-CM

## 2018-05-24 DIAGNOSIS — I50.33: ICD-10-CM

## 2018-05-24 DIAGNOSIS — I13.0: ICD-10-CM

## 2018-05-24 DIAGNOSIS — N39.0: ICD-10-CM

## 2018-05-24 DIAGNOSIS — Z87.440: ICD-10-CM

## 2018-05-24 DIAGNOSIS — Z86.711: ICD-10-CM

## 2018-05-24 DIAGNOSIS — E87.2: ICD-10-CM

## 2018-05-24 DIAGNOSIS — J44.9: ICD-10-CM

## 2018-05-24 DIAGNOSIS — Z86.718: ICD-10-CM

## 2018-05-24 DIAGNOSIS — D63.1: ICD-10-CM

## 2018-05-24 DIAGNOSIS — I48.91: ICD-10-CM

## 2018-05-24 LAB
ALBUMIN SERPL-MCNC: 2.7 G/DL (ref 3.5–5)
ALBUMIN/GLOB SERPL: 0.7 {RATIO} (ref 0.8–2)
ALP SERPL-CCNC: 58 IU/L (ref 40–150)
ALT SERPL-CCNC: 9 IU/L (ref 0–55)
ANION GAP SERPL CALC-SCNC: 20.6 MMOL/L (ref 8–16)
ANION GAP SERPL CALC-SCNC: 21.4 MMOL/L (ref 8–16)
ANISOCYTOSIS BLD QL SMEAR: SLIGHT
BACTERIA URNS QL MICRO: (no result) /HPF
BASOPHILS # BLD AUTO: 0 10*3/UL (ref 0–0.1)
BASOPHILS NFR BLD AUTO: 0.5 % (ref 0–1)
BILIRUB UR QL: NEGATIVE
BUN SERPL-MCNC: 87 MG/DL (ref 7–26)
BUN SERPL-MCNC: 89 MG/DL (ref 7–26)
BUN/CREAT SERPL: 11 (ref 6–25)
BUN/CREAT SERPL: 11 (ref 6–25)
CALCIUM SERPL-MCNC: 9.2 MG/DL (ref 8.4–10.2)
CALCIUM SERPL-MCNC: 9.8 MG/DL (ref 8.4–10.2)
CHLORIDE SERPL-SCNC: 106 MMOL/L (ref 98–107)
CHLORIDE SERPL-SCNC: 110 MMOL/L (ref 98–107)
CK MB SERPL-MCNC: 1.9 NG/ML (ref 0–5)
CK MB SERPL-MCNC: 2 NG/ML (ref 0–5)
CK SERPL-CCNC: 22 IU/L (ref 29–168)
CK SERPL-CCNC: 28 IU/L (ref 29–168)
CLARITY UR: CLEAR
CO2 SERPL-SCNC: 12 MMOL/L (ref 22–29)
CO2 SERPL-SCNC: 12 MMOL/L (ref 22–29)
COLOR UR: YELLOW
DEPRECATED APTT PLAS QN: 26.1 SECONDS (ref 23.8–35.5)
DEPRECATED INR PLAS: 1.16
DEPRECATED NEUTROPHILS # BLD AUTO: 6.3 10*3/UL (ref 2.1–6.9)
DEPRECATED RBC URNS MANUAL-ACNC: (no result) /HPF (ref 0–5)
EGFRCR SERPLBLD CKD-EPI 2021: 5 ML/MIN (ref 60–?)
EGFRCR SERPLBLD CKD-EPI 2021: 5 ML/MIN (ref 60–?)
ELLIPTOCYTES BLD QL SMEAR: (no result)
EOSINOPHIL # BLD AUTO: 0.1 10*3/UL (ref 0–0.4)
EOSINOPHIL NFR BLD AUTO: 0.8 % (ref 0–6)
EPI CELLS URNS QL MICRO: (no result) /LPF
ERYTHROCYTE [DISTWIDTH] IN CORD BLOOD: 24.4 % (ref 11.7–14.4)
GLOBULIN PLAS-MCNC: 4 G/DL (ref 2.3–3.5)
GLUCOSE SERPLBLD-MCNC: 47 MG/DL (ref 74–118)
GLUCOSE SERPLBLD-MCNC: 89 MG/DL (ref 74–118)
HCT VFR BLD AUTO: 35.9 % (ref 34.2–44.1)
HGB BLD-MCNC: 11.3 G/DL (ref 12–16)
KETONES UR QL STRIP.AUTO: NEGATIVE
LEUKOCYTE ESTERASE UR QL STRIP.AUTO: NEGATIVE
LYMPHOCYTES # BLD: 1 10*3/UL (ref 1–3.2)
LYMPHOCYTES NFR BLD AUTO: 12.4 % (ref 18–39.1)
MACROCYTES BLD QL SMEAR: SLIGHT
MAGNESIUM SERPL-MCNC: 0.9 MG/DL (ref 1.3–2.1)
MCH RBC QN AUTO: 27 PG (ref 28–32)
MCHC RBC AUTO-ENTMCNC: 31.5 G/DL (ref 31–35)
MCV RBC AUTO: 85.9 FL (ref 81–99)
MONOCYTES # BLD AUTO: 0.7 10*3/UL (ref 0.2–0.8)
MONOCYTES NFR BLD AUTO: 8.4 % (ref 4.4–11.3)
NEUTS SEG NFR BLD AUTO: 76.9 % (ref 38.7–80)
NITRITE UR QL STRIP.AUTO: NEGATIVE
PLAT MORPH BLD: NORMAL
PLATELET # BLD AUTO: 324 X10E3/UL (ref 140–360)
PLATELET # BLD EST: ADEQUATE 10*3/UL
POTASSIUM SERPL-SCNC: 6.6 MMOL/L (ref 3.5–5.1)
POTASSIUM SERPL-SCNC: 7.4 MMOL/L (ref 3.5–5.1)
PROT UR QL STRIP.AUTO: (no result)
PROTHROMBIN TIME: 13.9 SECONDS (ref 11.9–14.5)
RBC # BLD AUTO: 4.18 X10E6/UL (ref 3.6–5.1)
RBC MORPH BLD: (no result)
SODIUM SERPL-SCNC: 132 MMOL/L (ref 136–145)
SODIUM SERPL-SCNC: 136 MMOL/L (ref 136–145)
SP GR UR STRIP: 1.01 (ref 1.01–1.02)
UROBILINOGEN UR STRIP-MCNC: 0.2 MG/DL (ref 0.2–1)
WBC #/AREA URNS HPF: (no result) /HPF (ref 0–5)

## 2018-05-24 PROCEDURE — 76937 US GUIDE VASCULAR ACCESS: CPT

## 2018-05-24 PROCEDURE — 93005 ELECTROCARDIOGRAM TRACING: CPT

## 2018-05-24 PROCEDURE — 36430 TRANSFUSION BLD/BLD COMPNT: CPT

## 2018-05-24 PROCEDURE — 85610 PROTHROMBIN TIME: CPT

## 2018-05-24 PROCEDURE — 82728 ASSAY OF FERRITIN: CPT

## 2018-05-24 PROCEDURE — 87205 SMEAR GRAM STAIN: CPT

## 2018-05-24 PROCEDURE — 94660 CPAP INITIATION&MGMT: CPT

## 2018-05-24 PROCEDURE — 84484 ASSAY OF TROPONIN QUANT: CPT

## 2018-05-24 PROCEDURE — 86900 BLOOD TYPING SEROLOGIC ABO: CPT

## 2018-05-24 PROCEDURE — 76770 US EXAM ABDO BACK WALL COMP: CPT

## 2018-05-24 PROCEDURE — 82607 VITAMIN B-12: CPT

## 2018-05-24 PROCEDURE — 71046 X-RAY EXAM CHEST 2 VIEWS: CPT

## 2018-05-24 PROCEDURE — 02HV33Z INSERTION OF INFUSION DEVICE INTO SUPERIOR VENA CAVA, PERCUTANEOUS APPROACH: ICD-10-PCS

## 2018-05-24 PROCEDURE — 85730 THROMBOPLASTIN TIME PARTIAL: CPT

## 2018-05-24 PROCEDURE — 82550 ASSAY OF CK (CPK): CPT

## 2018-05-24 PROCEDURE — 36600 WITHDRAWAL OF ARTERIAL BLOOD: CPT

## 2018-05-24 PROCEDURE — 74470 X-RAY EXAM OF KIDNEY LESION: CPT

## 2018-05-24 PROCEDURE — 82948 REAGENT STRIP/BLOOD GLUCOSE: CPT

## 2018-05-24 PROCEDURE — 94640 AIRWAY INHALATION TREATMENT: CPT

## 2018-05-24 PROCEDURE — 86920 COMPATIBILITY TEST SPIN: CPT

## 2018-05-24 PROCEDURE — 82805 BLOOD GASES W/O2 SATURATION: CPT

## 2018-05-24 PROCEDURE — 93971 EXTREMITY STUDY: CPT

## 2018-05-24 PROCEDURE — 82746 ASSAY OF FOLIC ACID SERUM: CPT

## 2018-05-24 PROCEDURE — 87340 HEPATITIS B SURFACE AG IA: CPT

## 2018-05-24 PROCEDURE — 87177 OVA AND PARASITES SMEARS: CPT

## 2018-05-24 PROCEDURE — 87493 C DIFF AMPLIFIED PROBE: CPT

## 2018-05-24 PROCEDURE — 84443 ASSAY THYROID STIM HORMONE: CPT

## 2018-05-24 PROCEDURE — 86850 RBC ANTIBODY SCREEN: CPT

## 2018-05-24 PROCEDURE — 87186 SC STD MICRODIL/AGAR DIL: CPT

## 2018-05-24 PROCEDURE — 77001 FLUOROGUIDE FOR VEIN DEVICE: CPT

## 2018-05-24 PROCEDURE — 83735 ASSAY OF MAGNESIUM: CPT

## 2018-05-24 PROCEDURE — 84100 ASSAY OF PHOSPHORUS: CPT

## 2018-05-24 PROCEDURE — 5A1D70Z PERFORMANCE OF URINARY FILTRATION, INTERMITTENT, LESS THAN 6 HOURS PER DAY: ICD-10-PCS

## 2018-05-24 PROCEDURE — 86706 HEP B SURFACE ANTIBODY: CPT

## 2018-05-24 PROCEDURE — 83605 ASSAY OF LACTIC ACID: CPT

## 2018-05-24 PROCEDURE — 74018 RADEX ABDOMEN 1 VIEW: CPT

## 2018-05-24 PROCEDURE — 84439 ASSAY OF FREE THYROXINE: CPT

## 2018-05-24 PROCEDURE — 71045 X-RAY EXAM CHEST 1 VIEW: CPT

## 2018-05-24 PROCEDURE — 84466 ASSAY OF TRANSFERRIN: CPT

## 2018-05-24 PROCEDURE — 80053 COMPREHEN METABOLIC PANEL: CPT

## 2018-05-24 PROCEDURE — 72110 X-RAY EXAM L-2 SPINE 4/>VWS: CPT

## 2018-05-24 PROCEDURE — 87045 FECES CULTURE AEROBIC BACT: CPT

## 2018-05-24 PROCEDURE — 83540 ASSAY OF IRON: CPT

## 2018-05-24 PROCEDURE — 87071 CULTURE AEROBIC QUANT OTHER: CPT

## 2018-05-24 PROCEDURE — 99285 EMERGENCY DEPT VISIT HI MDM: CPT

## 2018-05-24 PROCEDURE — 83880 ASSAY OF NATRIURETIC PEPTIDE: CPT

## 2018-05-24 PROCEDURE — 87070 CULTURE OTHR SPECIMN AEROBIC: CPT

## 2018-05-24 PROCEDURE — 87040 BLOOD CULTURE FOR BACTERIA: CPT

## 2018-05-24 PROCEDURE — 80048 BASIC METABOLIC PNL TOTAL CA: CPT

## 2018-05-24 PROCEDURE — 93306 TTE W/DOPPLER COMPLETE: CPT

## 2018-05-24 PROCEDURE — 82553 CREATINE MB FRACTION: CPT

## 2018-05-24 PROCEDURE — 36415 COLL VENOUS BLD VENIPUNCTURE: CPT

## 2018-05-24 PROCEDURE — 96361 HYDRATE IV INFUSION ADD-ON: CPT

## 2018-05-24 PROCEDURE — 81001 URINALYSIS AUTO W/SCOPE: CPT

## 2018-05-24 PROCEDURE — 87086 URINE CULTURE/COLONY COUNT: CPT

## 2018-05-24 PROCEDURE — 36556 INSERT NON-TUNNEL CV CATH: CPT

## 2018-05-24 PROCEDURE — 51701 INSERT BLADDER CATHETER: CPT

## 2018-05-24 PROCEDURE — 85025 COMPLETE CBC W/AUTO DIFF WBC: CPT

## 2018-05-24 PROCEDURE — 93970 EXTREMITY STUDY: CPT

## 2018-05-24 PROCEDURE — 90962 ESRD SERV 1 VISIT P MO 20+: CPT

## 2018-05-24 PROCEDURE — 83630 LACTOFERRIN FECAL (QUAL): CPT

## 2018-05-24 RX ADMIN — DEXTROSE MONOHYDRATE SCH MLS/HR: 50 INJECTION, SOLUTION INTRAVENOUS at 18:35

## 2018-05-24 RX ADMIN — SODIUM CHLORIDE PRN MLS/HR: 9 INJECTION, SOLUTION INTRAVENOUS at 21:40

## 2018-05-24 RX ADMIN — HYDROCODONE BITARTRATE AND ACETAMINOPHEN PRN EA: 5; 325 TABLET ORAL at 20:05

## 2018-05-24 RX ADMIN — HEPARIN SODIUM PRN UNIT: 1000 INJECTION INTRAVENOUS; SUBCUTANEOUS at 22:00

## 2018-05-24 RX ADMIN — HEPARIN SODIUM PRN UNIT: 1000 INJECTION INTRAVENOUS; SUBCUTANEOUS at 22:05

## 2018-05-24 RX ADMIN — SODIUM CHLORIDE PRN MLS/HR: 9 INJECTION, SOLUTION INTRAVENOUS at 20:00

## 2018-05-24 RX ADMIN — HYDROCODONE BITARTRATE AND ACETAMINOPHEN PRN EA: 5; 325 TABLET ORAL at 15:20

## 2018-05-24 RX ADMIN — VANCOMYCIN HYDROCHLORIDE SCH MG: 250 CAPSULE ORAL at 21:30

## 2018-05-24 NOTE — DIAGNOSTIC IMAGING REPORT
Non-tunneled Central Venous Catheter Placement 5/24/2018



Pre-Procedure Diagnosis: Acute renal failure; poor IV access

Post-procedure Diagnosis:Acute renal failure; poor IV access



: CHARMAINE Lynne

Assistant: None

Sedation: None. 1% lidocaine local anesthesia.



Estimate blood loss: <5 mL Blood administered: None

Complications: None

Implants/Grafts: 16 cm 7-Mongolian 3 lumen CVC

Specimen: None





Procedure:

Informed consent was obtained and the patient positioned supine in the ICU. A

timeout was performed, followed by preliminary ultrasound of the right internal

jugular vein (see findings below). The right neck was prepped and draped in

standard fashion.



Using real-time ultrasound guidance a 18 gauge vascular needle was used to

access the  right internal jugular vein. An image was stored in the electronic

medical record. A wire was advanced while monitoring the patient's cardiac

rhythm and the needle exchanged for a non-tunneled central venous catheter

using standard Seldinger technique.



At the end of the procedure the catheter was flushed, secured to the skin and a

sterile dressing applied. The patient tolerated the procedure well and without

immediate complication.



Findings:

Patent right internal jugular vein as demonstrated by normal ultrasound

compressibility.



Impression:

1.  Successful placement of a non-tunneled right internal jugular central

venous catheter using ultrasound guidance.

2.  The patient had an indwelling right femoral non-tunneled dialysis catheter

in place at the time of central line placement; Trialysis catheter placement

was therefore deferred.









This report was generated with voice-recognition technology. Errors in

transcription can occur. Please interpret accordingly and contact a radiologist

if there are any questions regarding the report.



Signed by: Dr. Andrea Lynne M.D. on 5/24/2018 8:06 PM

## 2018-05-24 NOTE — XMS REPORT
Clinical Summary

 Created on: 2018



Daniel Alatorre

External Reference #: [id number]

: 1934

Sex: Female



Demographics







 Address  [address] [place]  [address]

 

 Home Phone  [phone]

 

 Preferred Language  English

 

 Marital Status  Unknown

 

 Hindu Affiliation  Latter day

 

 Race  White

 

 Ethnic Group  Non-





Author







 Author  [employer]

 

 [facility]

 

 Address  Unknown

 

 Phone  Unavailable







Support







 Name  Relationship  Address  Phone

 

 , Silvana Brown  Unknown  [phone]







Care Team Providers







 Care Team Member Name  Role  Phone

 

  PCP  Unavailable







Allergies







    



  Active Allergy   Reactions   Severity   Noted Date   Comments

 

    



  Sulfa (Sulfonamide   Anaphylaxis   High   2015 



  Antibiotics)    







Current Medications







      



  Prescription   Sig.   Disp.   Refills   Start   End Date   Status



      Date  

 

      



  metoprolol (TOPROL-XL)   Take 100 mg by mouth       Active



  100 MG 24 hr tablet   daily.     

 

      



  pantoprazole (PROTONIX)   Take 40 mg by mouth       Active



  40 MG tablet   daily.     

 

      



  aspirin 81 MG EC tablet   Take 81 mg by mouth       Active



   daily.     

 

      



  temazepam (RESTORIL) 30   Take 30 mg by mouth every       Active



  mg capsule   night as needed for     



   Sleep.     

 

      



  magnesium oxide 400 mg   Take by mouth. 1-3 x       Active



  Cap   daily     







Active Problems







 



  Problem   Noted Date

 

 



  Hyponatremia   2015







Encounters







    



  Date   Type   Specialty   Care Team   Description

 

    



  2017   Hospital   Cardiology   Jake Chery,   Peripheral 
vascular



   Encounter    MD   disease, unspecified



      (HCC)

 

    



  2017   Outside Orders   Central Scheduling   Jake Chery 
  Peripheral vascular



     MD   disease, unspecified



      (HCC) (Primary Dx)



after 2017



Social History







    



  Tobacco Use   Types   Packs/Day   Years Used   Date

 

    



  Former Smoker    









   



  Alcohol Use   Drinks/Week   oz/Week   Comments

 

   



  Yes   









 



  Sex Assigned at Birth   Date Recorded

 

 



  Not on file 







Last Filed Vital Signs

Not on file



Plan of Treatment





Not on file



Results

* PERIPHERAL VASCULAR REPORT - SCAN (2017  3:23 PM)

* Arterial Doppler Legs Bilateral (2017 10:08 AM)





  



  Component   Value   Ref Range

 

  



  Ejection Fraction  









 



  Specimen   Performing Laboratory

 

 



   [facility] ECHO [facility]ART[facility] MKCKESSON CPACS









 Impressions

 

 



Right Impression



1. The common femoral, profunda femoral, superficial femoral, popliteal,



posterior tibial, peroneal and anterior tibial arteries are patent with



biphasic Doppler waveforms throughout.



2. There is > 50 % stenosis of the common femoral and proximal superficial



femoral arteries with elevated velocities of 283 cm/s and 213 cm/s.



3. The PT pressure is 158 mmHg with an DELMY of 1.01(normal range) and the DP



pressure is 137 mmHg with an DELMY of 0.86 (mild obstruction range).



4. The great toe pressure is 71 mmHg with an abnormal TBI of 0.46.



5. The digits have adequate flow by PPG waveforms.



Left Impression



1. The common femoral, superficial femoral, popliteal and peroneal arteries



are patent with biphasic Doppler waveforms throughout.



2. The posterior tibial and anterior tibial arteries have monophasic



waveforms.



3. The PT pressure is 102 mmHg with an DELMY of 0.65 (severe obstruction



range)and the DP pressure is 88 mmHg with an DELMY of 0.56 (severe obstruction



range.



4. The great toe pressure is 46 mmHg with an abnormal TBI of 0.29.



5. The digits have adequate flow by PPG waveforms.



 



 Conclusions



 



 Summary



 



 Arterial pressures and Doppler analysis were performed bilaterally. The



 arteries were adequately visualized. On the right, the common femoral,



 profunda femoral, superficial femoral, popliteal, posterior tibial,



 peroneal and anterior tibial arteries were patent with biphasic Doppler



 waveforms throughout. There was stenosis of the common femoral and



 proximal superficial femoral artery. The DELMY's were in the normal to mild



 obstruction range. The TBI was abnormal. The digits had adequate flow by



 PPG. On the left, the common femoral, superficial femoral, popliteal and



 peroneal arteries were patent with biphasic Doppler waveforms throughout.



 The posterior tibial and anterior tibial arteries had monophasic



 waveforms. The DELMY's were in the severe obstruction range. The TBI was



 abnormal and the digits had adequate flow by PPG.



 



 Signature



 



 ----------------------------------------------------------------



 Electronically signed by BIANKA Shepard MD,



 JOSIAS(Interpreting physician) on 2017 02:53 PM



 ----------------------------------------------------------------



 



Velocities are measured in cm/s ; Diameters are measured in cm



 



LE Duplex Measurements



 




Right

Left



 



 +--------------------------------------------------------------+ +-------------
------+------------------+-----------------------+ +------------------+---------
--------+-------------------------+



 !Location
! !PSV!EDV
 !Waveform ! !PSV
 !EDV!Waveform
 !



 +--------------------------------------------------------------+ +-------------
------+------------------+-----------------------+ +------------------+---------
--------+-------------------------+



 !Mid Common Femoral
! !283!
!Biphasic ! !128
 ! !Biphasic
 !



 +--------------------------------------------------------------+ +-------------
------+------------------+-----------------------+ +------------------+---------
--------+-------------------------+



 !Prox PFA
! !187!
!Biphasic ! !105
 ! !Monophasic
 !



 +--------------------------------------------------------------+ +-------------
------+------------------+-----------------------+ +------------------+---------
--------+-------------------------+



 !Prox SFA
! !213!
!Biphasic ! !108
 ! !Biphasic
 !



 +--------------------------------------------------------------+ +-------------
------+------------------+-----------------------+ +------------------+---------
--------+-------------------------+



 !Mid SFA
 ! !140!
!Biphasic ! !124
 ! !Biphasic
 !



 +--------------------------------------------------------------+ +-------------
------+------------------+-----------------------+ +------------------+---------
--------+-------------------------+



 !Dist SFA
! !97.4 !
!Biphasic ! !98.2
! !Biphasic
 !



 +--------------------------------------------------------------+ +-------------
------+------------------+-----------------------+ +------------------+---------
--------+-------------------------+



 !Prox Popliteal
! !84.1 !
!Biphasic ! !65.7
! !Biphasic
 !



 +--------------------------------------------------------------+ +-------------
------+------------------+-----------------------+ +------------------+---------
--------+-------------------------+



 !Dist Popliteal
! !103!
!Biphasic ! !61
! !Biphasic
 !



 +--------------------------------------------------------------+ +-------------
------+------------------+-----------------------+ +------------------+---------
--------+-------------------------+



 !Prox PTA
! !87.2 !
!Biphasic ! !51.1
! !Biphasic
 !



 +--------------------------------------------------------------+ +-------------
------+------------------+-----------------------+ +------------------+---------
--------+-------------------------+



 !Mid PTA
 ! !91.9 !
!Biphasic ! !63.9
! !Monophasic
 !



 +--------------------------------------------------------------+ +-------------
------+------------------+-----------------------+ +------------------+---------
--------+-------------------------+



 !Dist PTA
! !92.7 !
!Biphasic ! !65.1
! !Monophasic
 !



 +--------------------------------------------------------------+ +-------------
------+------------------+-----------------------+ +------------------+---------
--------+-------------------------+



 !Prox UMESH
! !92.6 !
!Biphasic ! !57
! !Monophasic
 !



 +--------------------------------------------------------------+ +-------------
------+------------------+-----------------------+ +------------------+---------
--------+-------------------------+



 !Mid UMESH
 ! !108!
!Biphasic ! !82.5
! !Monophasic
 !



 +--------------------------------------------------------------+ +-------------
------+------------------+-----------------------+ +------------------+---------
--------+-------------------------+



 !Dist UMESH
! !86.2 !
!Biphasic ! !31
! !Monophasic
 !



 +--------------------------------------------------------------+ +-------------
------+------------------+-----------------------+ +------------------+---------
--------+-------------------------+



 !Prox Peroneal
 ! !68.6 !
!Biphasic ! !36.1
! !Biphasic
 !



 +--------------------------------------------------------------+ +-------------
------+------------------+-----------------------+ +------------------+---------
--------+-------------------------+



 !Dist Peroneal
 ! !56.3 !
!Biphasic ! !32.2
! !Biphasic
 !



 +--------------------------------------------------------------+ +-------------
------+------------------+-----------------------+ +------------------+---------
--------+-------------------------+



 









 Narrative

 

 



PV LAB - Lower Extremity Arterial Duplex



 



 Demographics



 



 Patient NameDANIEL ALATORRE Date of Study2017



 JONO



 



 MRN [id number] Age
82



 



 Visit [id number] Gender 
Female



 



 Accession [id number] Date of Birth



 Number



 



 Referring [facility] Room Number



 Physician GIRISH



 



 Sonographer Adrian Bender.Interpreting BIANKA Shepard RVT, ARDMS Physician
MD, [facility]



 



Procedure



Type of Study:



 



 Extremities Arteries: Lower Extremities Arterial Duplex, ARTERIAL DOPPLER



 LEGS, BILATERAL.



 



Indications for Study:PVD.



 



Patient Status:Routine.



Study Location:Vascular Lab.



Technical Quality:Adequate visualization.



 









 Procedure Note

 

 



Interface, External Ris In - 2017  2:53 PM CDT



[facility] LAB - Lower Extremity Arterial Duplex



 Demographics

 

 Patient Name    DANIEL ALATORRE   Date of Study    2017

                 JONO

 

 MRN             [id number]             Age              82

 

 Visit Number    [id number]           Gender           Female

 

 Accession       [id number]             Date of Birth    1934

 Number

 

 Referring       [facility] Room Number

 Physician STOUT

 

 Sonographer     Adrian Bender.    Interpreting     BIANKA Shepard RVT, ARDMS           Physician        MD, [facility]

 

Procedure

Type of Study:

 

 Extremities Arteries: Lower Extremities Arterial Duplex, ARTERIAL DOPPLER

 LEGS, BILATERAL.

 

Indications for Study:PVD.



Patient Status:Routine.

Study Location:Vascular Lab.

Technical Quality:Adequate visualization.



Impressions

Right Impression

 1. The common femoral, profunda femoral, superficial femoral, popliteal,

posterior tibial, peroneal and anterior tibial arteries are patent with

biphasic Doppler waveforms throughout.

 2. There is > 50 % stenosis of the common femoral and proximal superficial

femoral arteries with elevated velocities of 283 cm/s and 213 cm/s.

 3. The PT pressure is 158 mmHg with an DELMY of 1.01(normal range) and the DP

pressure is 137 mmHg with an DELMY of 0.86 (mild obstruction range).

 4. The great toe pressure is 71 mmHg with an abnormal TBI of 0.46.

 5. The digits have adequate flow by PPG waveforms.

Left Impression

 1. The common femoral, superficial femoral, popliteal and peroneal arteries

are patent with biphasic Doppler waveforms throughout.

 2. The posterior tibial and anterior tibial arteries have monophasic

waveforms.

 3. The PT pressure is 102 mmHg with an DELMY of 0.65 (severe obstruction

range)and the DP pressure is 88 mmHg with an DELMY of 0.56 (severe obstruction

range.

 4. The great toe pressure is 46 mmHg with an abnormal TBI of 0.29.

 5. The digits have adequate flow by PPG waveforms.



 Conclusions

 

 Summary

 

 Arterial pressures and Doppler analysis were performed bilaterally. The

 arteries were adequately visualized. On the right, the common femoral,

 profunda femoral, superficial femoral, popliteal, posterior tibial,

 peroneal and anterior tibial arteries were patent with biphasic Doppler

 waveforms throughout. There was stenosis of the common femoral and

 proximal superficial femoral artery. The DELMY's were in the normal to mild

 obstruction range. The TBI was abnormal. The digits had adequate flow by

 PPG. On the left, the common femoral, superficial femoral, popliteal and

 peroneal arteries were patent with biphasic Doppler waveforms throughout.

 The posterior tibial and anterior tibial arteries had monophasic

 waveforms. The DELMY's were in the severe obstruction range. The TBI was

 abnormal and the digits had adequate flow by PPG.

 

 Signature

 

 ----------------------------------------------------------------

 Electronically signed by BIANKA Shepard MD,

 RPVI(Interpreting physician) on 2017 02:53 PM

 ----------------------------------------------------------------

 

Velocities are measured in cm/s ; Diameters are measured in cm



LE Duplex Measurements



                                                                  Right        
                                                    Left

 

 +--------------------------------------------------------------+ +-------------
------+------------------+-----------------------+ +------------------+---------
--------+-------------------------+

 !Location                                                      ! !PSV         
       !EDV               !Waveform               ! !PSV               !EDV    
          !Waveform                 !

 +--------------------------------------------------------------+ +-------------
------+------------------+-----------------------+ +------------------+---------
--------+-------------------------+

 !Mid Common Femoral                                            ! !283         
       !                  !Biphasic               ! !128               !       
          !Biphasic                 !

 +--------------------------------------------------------------+ +-------------
------+------------------+-----------------------+ +------------------+---------
--------+-------------------------+

 !Prox PFA                                                      ! !187         
       !                  !Biphasic               ! !105               !       
          !Monophasic               !

 +--------------------------------------------------------------+ +-------------
------+------------------+-----------------------+ +------------------+---------
--------+-------------------------+

 !Prox SFA                                                      ! !213         
       !                  !Biphasic               ! !108               !       
          !Biphasic                 !

 +--------------------------------------------------------------+ +-------------
------+------------------+-----------------------+ +------------------+---------
--------+-------------------------+

 !Mid SFA                                                       ! !140         
       !                  !Biphasic               ! !124               !       
          !Biphasic                 !

 +--------------------------------------------------------------+ +-------------
------+------------------+-----------------------+ +------------------+---------
--------+-------------------------+

 !Dist SFA                                                      ! !97.4        
       !                  !Biphasic               ! !98.2              !       
          !Biphasic                 !

 +--------------------------------------------------------------+ +-------------
------+------------------+-----------------------+ +------------------+---------
--------+-------------------------+

 !Prox Popliteal                                                ! !84.1        
       !                  !Biphasic               ! !65.7              !       
          !Biphasic                 !

 +--------------------------------------------------------------+ +-------------
------+------------------+-----------------------+ +------------------+---------
--------+-------------------------+

 !Dist Popliteal                                                ! !103         
       !                  !Biphasic               ! !61                !       
          !Biphasic                 !

 +--------------------------------------------------------------+ +-------------
------+------------------+-----------------------+ +------------------+---------
--------+-------------------------+

 !Prox PTA                                                      ! !87.2        
       !                  !Biphasic               ! !51.1              !       
          !Biphasic                 !

 +--------------------------------------------------------------+ +-------------
------+------------------+-----------------------+ +------------------+---------
--------+-------------------------+

 !Mid PTA                                                       ! !91.9        
       !                  !Biphasic               ! !63.9              !       
          !Monophasic               !

 +--------------------------------------------------------------+ +-------------
------+------------------+-----------------------+ +------------------+---------
--------+-------------------------+

 !Dist PTA                                                      ! !92.7        
       !                  !Biphasic               ! !65.1              !       
          !Monophasic               !

 +--------------------------------------------------------------+ +-------------
------+------------------+-----------------------+ +------------------+---------
--------+-------------------------+

 !Prox UMESH                                                      ! !92.6        
       !                  !Biphasic               ! !57                !       
          !Monophasic               !

 +--------------------------------------------------------------+ +-------------
------+------------------+-----------------------+ +------------------+---------
--------+-------------------------+

 !Mid UMESH                                                       ! !108         
       !                  !Biphasic               ! !82.5              !       
          !Monophasic               !

 +--------------------------------------------------------------+ +-------------
------+------------------+-----------------------+ +------------------+---------
--------+-------------------------+

 !Dist UMESH                                                      ! !86.2        
       !                  !Biphasic               ! !31                !       
          !Monophasic               !

 +--------------------------------------------------------------+ +-------------
------+------------------+-----------------------+ +------------------+---------
--------+-------------------------+

 !Prox Peroneal                                                 ! !68.6        
       !                  !Biphasic               ! !36.1              !       
          !Biphasic                 !

 +--------------------------------------------------------------+ +-------------
------+------------------+-----------------------+ +------------------+---------
--------+-------------------------+

 !Dist Peroneal                                                 ! !56.3        
       !                  !Biphasic               ! !32.2              !       
          !Biphasic                 !

 +--------------------------------------------------------------+ +-------------
------+------------------+-----------------------+ +------------------+---------
--------+-------------------------+

 





after 2017

## 2018-05-24 NOTE — DIAGNOSTIC IMAGING REPORT
Non-tunneled Central Venous Catheter Placement 5/24/2018



Pre-Procedure Diagnosis: Acute renal failure; poor IV access

Post-procedure Diagnosis:Acute renal failure; poor IV access



: CHARMAINE Lynne

Assistant: None

Sedation: None. 1% lidocaine local anesthesia.



Estimate blood loss: <5 mL Blood administered: None

Complications: None

Implants/Grafts: 16 cm 7-Bulgarian 3 lumen CVC

Specimen: None





Procedure:

Informed consent was obtained and the patient positioned supine in the ICU. A

timeout was performed, followed by preliminary ultrasound of the right internal

jugular vein (see findings below). The right neck was prepped and draped in

standard fashion.



Using real-time ultrasound guidance a 18 gauge vascular needle was used to

access the  right internal jugular vein. An image was stored in the electronic

medical record. A wire was advanced while monitoring the patient's cardiac

rhythm and the needle exchanged for a non-tunneled central venous catheter

using standard Seldinger technique.



At the end of the procedure the catheter was flushed, secured to the skin and a

sterile dressing applied. The patient tolerated the procedure well and without

immediate complication.



Findings:

Patent right internal jugular vein as demonstrated by normal ultrasound

compressibility.



Impression:

1.  Successful placement of a non-tunneled right internal jugular central

venous catheter using ultrasound guidance.

2.  The patient had an indwelling right femoral non-tunneled dialysis catheter

in place at the time of central line placement; Trialysis catheter placement

was therefore deferred.









This report was generated with voice-recognition technology. Errors in

transcription can occur. Please interpret accordingly and contact a radiologist

if there are any questions regarding the report.



Signed by: Dr. Andrea Lynne M.D. on 5/24/2018 8:06 PM

## 2018-05-24 NOTE — XMS REPORT
Continuity of Care Document

 Created on: 2018



DANIEL ARTHUR

External Reference #: [id number]

: 1934

Sex: Female



Demographics







 Address  [address] [place]  [address]

 

 Home Phone  [phone]

 

 Preferred Language  Unknown

 

 Marital Status  Unknown

 

 Druze Affiliation  Unknown

 

 Race  White

 

 Additional Race(s)  

 

 Ethnic Group  Unknown





Author







 Author  [facility]

 

 Organization  [facility]

 

 Address  [address]

[place]  [address]



 

 Phone  Unavailable







Support







 Name  Relationship  Address  Phone

 

 CICI CARLOS MD  Caregiver  [address]

[place]  [address]  Unavailable

 

 THANH COTTER MD  Caregiver  [address]

[place]  [address]  [phone]

 

 THANH COTTER MD  Caregiver  [address]

[place]  [address]  [phone]

 

 THANH COTTER MD  Caregiver  [address]

[place]  [address]  [phone]

 

 THANH COTTER MD  Caregiver  [address]

[place]  [address]  [phone]

 

 MARK VIDAL  Next Of Kin  [address], [place]  [phone]







Care Team Providers







 Care Team Member Name  Role  Phone

 

 THANH COTTER MD  PCP  [phone]







Insurance Providers







 Guarantor  Daniel Arthur

 

 Address  [address] [place] [address]

 

 Phone  [phone]

 

 Email  [email or web address]











 Payer  Medicare A & B

 

 Policy Number  [id number]

 

 Subscriber's Name  Daniel Arthur

 

 Relationship  18 Self / Same As Patient

 

 Group Number  [id number]

 

 Group Name  RETIRED

 

 Effective Date  99











 Payer  GEHA

 

 Policy Number  [id number]

 

 Subscriber's Name  Beatriz Alatorre

 

 Relationship  G8 Other Relationship

 

 Group Number  [id number]

 

 Group Name  RETIRED

 

 Effective Date  83







Advance Directives







 Directive  Response  Recorded Date/Time

 

 Does the patient have an advance directive?  Yes  18 9:18pm

 

 If yes, is advance directive on file with [facility]?  No  18 9:18pm

 

 If not on file with [facility] will patient provide a copy?  Yes  18 9:18pm

 

 Do you have a Directive to Physician?  No  18 4:17pm

 

 Do you have a Medical Power of ?  No  18 4:17pm

 

 Do you have an out of hospital Do Not Resuscitate Order?  No  18 4:17pm

 

 Do you have any special needs we should be aware of?  No  18 4:17pm

 

 Do you have a support person here with you today?  Yes  18 4:17pm

 

 Did patient receive Notice of Privacy Practices?  Yes  18 4:17pm

 

 Did patient receive patient rights and responsibilities?  Yes  18 4:17pm







Problems







 Medical Problem  Onset Date  Status

 

 Arterial occlusion, lower extremity  Unknown   

 

 Cystitis  Unknown  Acute

 

 Headache  Unknown  Acute

 

 Hypertension  Unknown  Acute

 

 Urinary tract infection  Unknown  Acute







Medications





Current Home Medications





 Medication  Dose  Units  Route  Directions  Days  Qty  Instructions  Start Date

 

 Allopurinol 100 Mg Tablet  100  Mg  Oral  Daily     30 Tab      

 

 Allopurinol 300 Mg Tablet  100  Mg  Oral  Daily     30 Tab      

 

 Amlodipine Besylate 5 Mg Tablet  10  Mg  Oral  Daily     30 Tab      

 

 Aspirin (Aspir 81) 81 Mg Tablet.dr  81  Mg  Oral  Daily            

 

 Biotin 2,500 Mcg Capsule  5,000  Mg  Oral  Daily            

 

 Clonidine Hcl 0.2 Mg Tablet  0.2  Mg  Oral  Three Times A Day            

 

 Hydrocodone Bit/Acetaminophen (Norco 5-325 Tablet) 1 Each Tablet  1  Each  
Oral  As Needed            

 

 Ibesartan  150  Mg  Oral  Twice A Day            

 

 Levothyroxine Sodium 50 Mcg Tablet  50  Mcg  Oral  Daily     30 Tab      

 

 Megestrol Acetate 40 Mg Tablet  20  Mg  Oral  Daily     30 Tab      

 

 Metoprolol Succinate 50 Mg Tab.er.24h  100  Mg  Oral  Daily            

 

 Pantoprazole Sodium (Protonix) 40 Mg Tablet.dr  40  Mg  Oral  Daily            

 

 Pramipexole Di-Hcl (Mirapex) 0.25 Mg Tablet  0.5  Mg  Oral  Bedtime     30 Tab
      

 

 Warfarin Sodium (Coumadin) 1 Mg Tablet  0.5  Mg  Oral  Today At 5:00PM     7 
Tab      









Past Home Medications





 Medication  Directions  Ordered  Status

 

 Pitavastatin Calcium (Livalo) 4 Mg Tablet, 4 Mg Oral  Daily     Discontinued







Social History







 Social History Problem  Response  Recorded Date/Time  Onset Date  Status

 

 Hx Psychiatric Problems  No  2018 9:18pm  Not Applicable  Not Applicable

 

 Hx Eating Disorder  No  2018 9:18pm  Not Applicable  Not Applicable

 

 Hx Substance Use Disorder  No  2018 9:18pm  Not Applicable  Not 
Applicable

 

 Hx Depression  No  2018 9:18pm  Not Applicable  Not Applicable

 

 Hx Alcohol Use  No  2018 9:18pm  Not Applicable  Not Applicable

 

 Hx Substance Use Treatment  No  2018 9:18pm  Not Applicable  Not 
Applicable

 

 Hx Physical Abuse  No  2018 9:18pm  Not Applicable  Not Applicable







Hospital Discharge Instructions

No hospital discharge instruction information available.



Plan of Care







 Discharge Date  18 6:43pm

 

 Disposition  HOME, SELF-CARE

 

 Instructions/Education Provided  Hematuria - Female

 

 Prescriptions  See Medication Section

 

 Additional Instructions/Education  REGULAR DIET AND FOLLOW UP WITH DR COTTER 
IN 2 WEEKS







Functional Status







 Query  Response  Date Recorded

 

 FUNCTIONAL STATUS  .

  2018 11:51am

 

 Assistive Devices  Rolling Walker

  2018 9:24pm

 

 Ambulation Ability  Minimum Assistance

  2018 9:24pm

 

 Toileting Ability  Independent

  May 14, 2018 1:41pm







Allergies, Adverse Reactions, Alerts







 Allergen  Type  Severity  Reaction  Status  Last Updated

 

 Sulfa (Sulfonamide Antibiotics)  Allergy  Mild     Active  17

 

 Statins-Hmg-Coa Reductase Inhibitor  Adverse Reaction  Intermediate  MUSCLE 
SPASMS/SEVERE LEG CRAMPS  Active  17

 

 Levofloxacin  Allergy  Mild  MUSCLE CRAMPS  Active  12/09/10







Immunizations

No immunization information available.



Vital Signs





Acute Vital Signs





 Vital  Response  Date/Time

 

 Temperature (Fahrenheit)  97.8 degrees F (97.6 - 99.5)  2018 4:06pm

 

 Pulse      

 

    Pulse Rate (adult)  88 bpm (60 - 90)  2018 4:06pm

 

 Respiratory Rate  18 bpm (12 - 24)  2018 4:06pm

 

 Blood Pressure  130/62 mm Hg  2018 4:06pm

 

 Height  5 ft 3 in  2018 1:42pm

 

 Weight  103 lb  2018 12:00am

 

 Body Mass Index  18.2 kg/m^2  2018 12:00am







Results





Laboratory Results





 Test Name  Result  Units  Flags  Reference  Collection Date/Time  Result Date/
Time  Comments

 

 Ovalocytes  FEW           2017 6:20am  2017 7:57am   

 

 Amylase Level  94  U/L       2017 7:25am  2017 9:16am   

 

 Lipase  26  U/L     8-78  2017 7:25am  2017 9:16am   

 

 Triglycerides Level  131  MG/DL     0-149  2017 7:24am  2017 8:
26am   

 

 Cholesterol Level  273  MD/DL   H  0-199  2017 7:24am  2017 8:26am
  Less than 200 mg/dL       Low Risk



 201 - 239 mg/dL           Borderline Risk



 240 mg/dl and greater     High Risk                        



 

 LDL Cholesterol  190  MG/DL   H    2017 7:24am  2017 8:26am 
  

 

 HDL Cholesterol  57  MG/DL     40-60  2017 7:24am  2017 8:26am   

 

 Cholesterol/HDL Ratio  4.8      H  3.0-3.6  2017 7:24am  2017 8:
26am   

 

 Urine Mucus  FEW      H  RARE  2017 9:20pm  2017 10:32pm   

 

 Toxic Granulation  SLIGHT           2018 6:30am  2018 10:07am   

 

 Activated Partial Thromboplast Time  32.0  seconds     23.8-35.5  2018 8:
31am  2018 10:10am   

 

 D-Dimer Quantitative (PE/DVT)  3.04  ug/mLFEU   H  0.00-0.45  2018 8:
31am  2018 10:13am  As with all in vitro diagnostic tests, the test 
results 

should be interpreted by the physician in conjunction with 

clinical findings and other test results.







Test results are reported in NEW D-dimer units(ug/mLFEU).

 

 Thyroid Stimulating Hormone (TSH)  6.193  uIU/mL   H  0.350-4.940  2018 8
:31am  2018 10:47am   

 

 White Blood Count  6.17  x10e3/uL     4.8-10.8  2018 6:55am  2018 7
:42am   

 

 Red Blood Count  4.60  x10e6/uL     3.6-5.1  2018 6:55am  2018 7:
42am   

 

 Hemoglobin  11.9  g/dL   L  12.0-16.0  2018 6:55am  2018 7:42am   

 

 Hematocrit  38.3  %     34.2-44.1  2018 6:55am  2018 7:42am   

 

 Mean Corpuscular Volume  83.3  fL     81-99  2018 6:55am  2018 7:
42am   

 

 Mean Corpuscular Hemoglobin  25.9  pg   L  28-32  2018 6:55am  2018 7:42am   

 

 Mean Corpuscular Hemoglobin Concent  31.1  g/dL     31-35  2018 6:55am  
2018 7:42am   

 

 Red Cell Distribution Width  25.1  %   H  11.7-14.4  2018 6:55am  2018 7:42am   

 

 Platelet Count  257  x10e3/uL     140-360  2018 6:55am  2018 7:
42am   

 

 Neutrophils (%) (Auto)  64.6  %     38.7-80.0  2018 6:55am  2018 7:
42am   

 

 Lymphocytes (%) (Auto)  19.1  %     18.0-39.1  2018 6:55am  2018 7:
42am   

 

 Monocytes (%) (Auto)  9.6   %     4.4-11.3  2018 6:55am  2018 7:
42am   

 

 Eosinophils (%) (Auto)  5.0  %     0.0-6.0  2018 6:55am  2018 7:
42am   

 

 Basophils (%) (Auto)  0.6  %     0.0-1.0  2018 6:552018 7:42am
   

 

 IM GRANULOCYTES %  1.1  %   H  0.0-1.0  2018 6:552018 7:42am   

 

 Neutrophils # (Auto)  4.0        2.1-6.9  2018 6:55am  2018 7:42am
   

 

 Lymphocytes # (Auto)  1.2        1.0-3.2  2018 6:55am  2018 7:42am
   

 

 Monocytes # (Auto)  0.6        0.2-0.8  2018 6:55am  2018 7:42am   

 

 Eosinophils # (Auto)  0.3        0.0-0.4  2018 6:55am  2018 7:42am
   

 

 Basophils # (Auto)  0.0        0.0-0.1  2018 6:55am  2018 7:42am   

 

 Absolute Immature Granulocyte (auto  0.07  x10e3/uL     0-0.1  2018 6:
55am  2018 7:42am   

 

 Differential Total Cells Counted  100           2018 6:20am  2018 8
:07am   

 

 Neutrophils % (Manual)  82  %   H  40-74  2018 6:20am  2018 8:07am
   

 

 Band Neutrophils %  1  %        2018 6:20am  2018 8:07am   

 

 Lymphocytes % (Manual)  16  %   L  19-48  2018 6:20am  2018 8:07am
   

 

 Monocytes % (Manual)  2  %   L  3.4-9.0  2018 6:53am  2018 9:36am 
  

 

 Eosinophils % (Manual)  1  %     0-7  2018 6:20am  2018 8:07am   

 

 Metamyelocytes %  1  %   H  0-0  2018 6:53am  2018 9:36am   

 

 Reactive Lymphocytes  1           2018 6:53am  2018 9:36am   

 

 Smudge Cells  FEW           2018 6:53am  2018 9:36am   

 

 Platelet Estimate  ADEQUATE           05/10/2018 6:42am  05/10/2018 8:09am   

 

 Platelet Morphology Comment  FEW LARGE           05/10/2018 6:42am  05/10/2018 
8:09am   

 

 Hypochromasia  MODERATE           2018 6:54am  2018 9:54am   

 

 Poikilocytosis  SLIGHT           05/10/2018 6:42am  05/10/2018 8:09am   

 

 Anisocytosis  MODE           05/10/2018 6:42am  05/10/2018 8:09am   

 

 Microcytosis  MODERATE           2018 5:15am  2018 7:59am   

 

 Macrocytosis  MODERATE           2018 6:05am  2018 9:37am   

 

 Target Cells  MODERATE           2018 5:15am  2018 7:59am   

 

 Ruvalcaba-Rice Tracts Bodies  FEW           2018 6:54am  2018 9:54am   

 

 Elliptocytes  SLIGHT           2018 6:54am  2018 9:54am   

 

 Red Cell Morphology Comment  ABNORMAL           05/10/2018 6:42am  05/10/2018 8
:09am   

 

 Prothrombin Time  14.6  seconds   H  11.9-14.5  2018 7:09am  2018 
10:02am   

 

 Prothromb Time International Ratio  1.23           2018 7:09am  2018 10:02am  Oral Anticoagulant Therapy INR Values:



 1. Low Intensity Therapy        1.5 - 2.0



 2. Moderate Intensity Therapy   2.0 - 3.0



 3. High Intensity Therapy(1)    2.5 - 3.5



 4. High Intensity Therapy(2)    3.0 - 4.0



 5. Panic Value INR              > 5.0

 

 Urine Color  YELLOW        YELLOW  2018 3:58pm  2018 4:12pm   

 

 Urine Clarity  CLOUDY      H  CLEAR  2018 3:58pm  2018 4:12pm   

 

 Urine Specific Gravity  1.015        1.010-1.025  2018 3:58pm  2018 4:12pm   

 

 Urine pH  7        5 - 7  2018 3:58pm  2018 4:12pm   

 

 Urine Leukocyte Esterase  2+      H  NEGATIVE  2018 3:58pm  2018 4:
12pm   

 

 Urine Nitrite  POSITIVE      H  NEGATIVE  2018 3:58pm  2018 4:12pm
   

 

 Urine Protein  1+      H  NEGATIVE  2018 3:58pm  2018 4:12pm   

 

 Urine Glucose (UA)  NEGATIVE        NEGATIVE  2018 3:58pm  2018 4:
12pm   

 

 Urine Ketones  NEGATIVE        NEGATIVE  2018 3:58pm  2018 4:12pm 
  

 

 Urine Urobilinogen  0.2  mg/dL     0.2 - 1  2018 3:58pm  2018 4:
12pm   

 

 Urine Bilirubin  NEGATIVE        NEGATIVE  2018 3:58pm  2018 4:
12pm   

 

 Urine Blood  1+      H  NEGATIVE  2018 3:58pm  2018 4:12pm   

 

 Urine WBC  >50  /HPF   H  0-5  2018 3:58pm  2018 4:21pm   

 

 Urine RBC  6-10  /HPF   H  0-5  2018 3:58pm  2018 4:21pm   

 

 Urine Bacteria  FEW  /HPF     NONE  2018 3:58pm  2018 4:21pm   

 

 Urine Epithelial Cells  NONE  /LPF     NONE  2018 3:58pm  2018 4:
21pm   

 

 Sodium Level  133  mmol/L   L  136-145  2018 6:55am  2018 7:52am   

 

 Potassium Level  5.0  mmol/L     3.5-5.1  2018 6:55am  2018 7:52am
   

 

 Chloride Level  104  mmol/L       2018 6:55am  2018 7:52am   

 

 Carbon Dioxide Level  24  mmol/L     22-29  2018 6:55am  2018 7:
52am   

 

 Anion Gap  10.0  mmol/L     8-16  2018 6:55am  2018 7:52am   

 

 Blood Urea Nitrogen  15  mg/dL     7-26  2018 6:55am  2018 7:52am 
  

 

 Creatinine  0.83  mg/dL     0.57-1.11  2018 6:55am  2018 7:52am   

 

 BUN/Creatinine Ratio  18        6-25  2018 6:55am  2018 7:52am   

 

 Estimat Glomerular Filtration Rate  > 60  ML/MIN     60-  2018 6:55am   7:52am  Ranges were taken from the National Kidney Disease Education 

Program and the National Kid[employer] Foundation literature.







Reference ranges:



 60 or greater: Normal



 16-59 (for 3 consecutive months): Chronic kidney disease 



 15 or less: Kidney failure

 

 Glucose Level  76  mg/dL       2018 6:55am  2018 7:52am   

 

 Calcium Level  8.9  mg/dL     8.4-10.2  2018 6:55am  2018 7:52am   

 

 Bedside Glucose  262  mg/dL   H    2018 7:48am  2018 7:55am  
Meter ID: [id number]



 

 Lactic Acid Level  7.5  MG/DL     4.5-19.8  2018 5:45pm  2018 6:
05pm   

 

 Uric Acid  5.8  mg/dL     2.6-8.0  2018 6:25am  2018 7:20am   

 

 Phosphorus Level  2.6  MG/DL     2.3-4.7  2018 7:302018 8:53am
   

 

 Magnesium Level  1.3  MG/DL     1.3-2.1  2018 7:132018 8:08am 
  

 

 Iron Level  50  ug/dL       2018 8:332018 10:17am   

 

 Total Iron Binding Capacity  213  ug/dL   L  261-478  2018 8:332018 10:17am   

 

 Percent Iron Saturation  23  %     15-50  2018 8:332018 10:
17am   

 

 Transferrin  152  mg/dL   L  180-382  2018 8:332018 10:17am   

 

 Ferritin  708.92  ng/mL   H  4..00  2018 8:332018 10:
32am   

 

 Total Bilirubin  0.4  mg/dL     0.2-1.2  2018 7:132018 8:08am 
  

 

 Aspartate Amino Transf (AST/SGOT)  14  IU/L     5-34  2018 7:2018 8:08am   

 

 Alanine Aminotransferase (ALT/SGPT)  8  IU/L     0-55  2018 7: 8:08am   

 

 Total Protein  5.0  g/dL   L  6.5-8.1  2018 7:2018 8:08am   

 

 Albumin  2.1  g/dL   L  3.5-5.0  2018 7:2018 8:08am   

 

 Globulin  2.9  g/dL     2.3-3.5  2018 7:2018 8:08am   

 

 Albumin/Globulin Ratio  0.7      L  0.8-2.0  2018 7:132018 8:
08am   

 

 Alkaline Phosphatase  46  IU/L       2018 7:132018 8:
08am   

 

 B-Type Natriuretic Peptide  159.4  pg/mL   H  0-100  2018 1:40pm  2018 5:52pm   

 

 Creatine Kinase  8  IU/L   L    2018 5:24pm  2018 5:48pm   

 

 Creatine Kinase MB  0.90  ng/mL     0-5.0  2018 5:24pm  2018 5:
54pm   

 

 Troponin I  0.015  ng/mL     0-0.300  2018 5:24pm  2018 5:54pm   

 

 Albumin (PEP)  2.6  g/dL   L  2.9-4.4  2018 7:302018 9:09pm   

 

 Alpha-1-Globulins  0.3  g/dL     0.0-0.4  2018 7:2018 9:09pm
   

 

 Alpha-2-Globulins  1.1  g/dL   H  0.4-1.0  2018 7:302018 9:
09pm   

 

 Beta Gamma Globulin  0.8  g/dL     0.7-1.3  2018 7:2018 9:
09pm   

 

 Gamma Globulins  0.5  g/dL     0.4-1.8  2018 7:2018 9:09pm   

 

 Total Protein (PEP)  5.3  g/dL   L  6.0-8.5  2018 7:2018 9:
09pm   

 

 Globulin  2.7  g/dL     2.2-3.9  2018 7:302018 9:09pm   

 

 Cowarts Light Chain Analysis  8.5  mg/L     3.3-19.4  2018 7:2018 9:09pm   

 

 Lambda Light Chain Analysis  10.6  mg/L     5.7-26.3  2018 7:302018 9:09pm   

 

 Totl Cowarts/Lambda Light Chain Ratio  0.80        0.26-1.65  2018 7:30am  
2018 9:09pm  Performed at:   - [facility] [address]  [id number]



: Nelson King MD, Phone:  [phone]



Performed at:  [facility] [address], [place], [place]  [id number]



: CHANDA Maldonado MD, Phone:  [phone]



 

 Protein Electrophoresis M-Tian  Not Observed  g/dL     Not Observed  2018 7:30am  2018 9:09pm   

 

 Albumin/Globulin Ratio  1.0        0.7-1.7  2018 7:30am  2018 9:
09pm   

 

 Protein Electrophoresis Note  Comment        .  2018 7:30am  2018 9
:09pm  Protein electrophoresis scan will follow via computer,



mail, or  delivery.



 

 25-Hydroxy Vitamin D Total  31  ng/mL     .  2018 6:25am  2018 5:
32am  Reference Range:



All Ages: Target levels 30 - 100



 

 25-Hydroxy Vitamin D3  30  ng/mL     .  2018 6:25am  2018 5:32am  
Performed at:  [facility]



 [address] [place] [address]  [id number]



: Doug Kraus MD, Phone:  [phone]



 

 25-Hydroxy Vitamin D2  <1.0  ng/mL     .  2018 6:25am  2018 5:32am
   

 

 Urine Protein  12.9  mg/dL     Not Estab.  2018 7:49am  2018 9:
09pm   

 

 Urine Albumin  35.6  %     .  2018 7:49am  2018 9:09pm   

 

 Urine Alpha-1-Globulin  11.4  %     .  2018 7:49am  2018 9:09pm   

 

 Urine Alpha-2-Globulins  19.5  %     .  2018 7:49am  2018 9:09pm   

 

 Urine Beta Globulin  23.6  %     .  2018 7:49am  2018 9:09pm   

 

 Urine Gamma Globulin  9.9  %     .  2018 7:49am  2018 9:09pm   

 

 Urine Random PEP M-Tian %  Not Observed  %     Not Observed  2018 7:
49am  2018 9:09pm   

 

 Protein Electrophoresis Note  Comment        .  2018 7:49am  2018 9
:09pm  Protein electrophoresis scan will follow via computer,



mail, or  delivery.



Performed at:  [address]  [id number]



: Nelson King MD, Phone:  [phone]



Performed at:  [address], [place], [place]  [id number]



: CHANDA Maldonado MD, Phone:  [phone]



 

 Parathyroid Hormone  13  pg/mL   L  15-65  2018 6:30am  2018 8:
28am   

 

 Calcium (Send out)  9.7  mg/dL     8.7-10.3  2018 6:30am  2018 8:
28am   

 

 Parathyroid Hormone Interpretation  Comment        .  2018 6:30am  2018 8:28am  Interpretation                 Intact PTH    Calcium



                                (pg/mL)      (mg/dL)



Normal                          15 - 65     8.6 - 10.2



Primary Hyperparathyroidism         >65          >10.2



Secondary Hyperparathyroidism       >65          <10.2



Non-Parathyroid Hypercalcemia       <65          >10.2



Hypoparathyroidism                  <15          < 8.6



Non-Parathyroid Hypocalcemia    15 - 65          < 8.6



Performed at:  [address]  [id number]



: Nelson King MD, Phone:  [phone]



Performed at:  [address]  [id number]



: Hoawrd Lynne MD, Phone:  [phone]



 

 Stool Occult Blood  POSITIVE      H  NEGATIVE  2018 8:47pm  2018 8:
05am   

 

 Clostridium Difficile Toxin A & B  NEGATIVE        NEGATIVE  2018 8:47pm
  2018 9:51am  Testing on stool aspirate specimens is outside 
 

claims since specimen type not validated on this assay.









Microbiology Results





 Procedure  Source  Organism/Result  Collection Date/Time  Result Date/Time  
Result Status

 

 Urine Culture  Urine,Clean Catch   PSEUDOMONAS AERUGINOSA  2018 3:58pm  
2018 10:45am  Final

 

 Fungal Smear  [id number], Esophageal  WILL ALBICANS  2018 3:20pm  2018 9:18am  Final

 

 Blood Culture  Blood   NO GROWTH AFTER 5 DAYS, FINAL REPORT  2018 3:00pm
  2018 3:31pm  Final







Procedures







 Procedure  Status  Date  Provider(s)

 

 TRANSFUSE NONAUT RED BLOOD CELLS IN PERIPH VEIN, PERC  Completed  18  
THANH COTTER MD

 

 EGD with biopsy  Completed  18  LYNDSAY GIRARD MD

 

 EGD (esophagogastroduodenoscopy)  Completed  18  LYNDSAY GIRARD MD

 

 Colonoscopy with biopsy  Completed  18  LYNDSAY GIRARD MD

 

 CT of abdomen and pelvis without contrast  Active  17  CICI CARLOS MD

 

 X-ray of chest, two views  Active  17  CICI CARLOS MD

 

 X-ray of chest, two views  Active  17  COBY TY MD

 

 Computed tomography angiography of abdominal aorta and bilateral iliofemoral 
arteries with lower extremity runoff without then with contrast  Active    CICI CARLOS MD

 

 Computed tomography of brain without radiopaque contrast  Active  17  
NOEMY SUGGS MD

 

 Computed tomography of brain without radiopaque contrast  Active  17  
FABIOLA CASTILLO MD

 

 Computed tomography of chest without contrast  Active  18  CICI CARLOS MD

 

 X-ray of chest, two views  Active  18  BEATRIZ WHEELER MD

 

 Ultrasound of chest including mediastinum  Active  18  BEATRIZ WHEELER MD







Encounters







 Encounter  Location  Arrival/Admit Date  Discharge/Depart Date  Attending 
Provider

 

 Discharged Inpatient  [facility]  18 4:28pm  18 
6:43pm  THANH COTTER MD

 

 Discharged Inpatient  [facility]  18 11:46am   1:04pm  THANH COTTER MD

 

 Discharged Recurring  [facility]  18 7:55am  18 
11:59pm  ALVIN BRANDON MD

 

 Registered Clinic  [facility]  17 3:00pm     AJLIL REAGAN

 

 Departed Emergency Room  [facility]  17 4:36pm   12:30am  FABIOLA CASTILLO MD

 

 Registered Clinic  [facility]  17 2:08pm     NOEMY SUGGS MD

 

 Discharged Inpatient  [facility]  17 2:10am  17 
7:15pm  THANH COTTER MD

 

 Registered Clinic  [facility]  17 1:20pm     THANH COTTER MD

 

 Discharged Inpatient  [facility]  17 4:13pm  17 
2:20pm  THANH COTTER MD

## 2018-05-24 NOTE — XMS REPORT
Clinical Summary

 Created on: 2018



Daniel Arthur

External Reference #: [id number]

: 1934

Sex: Female



Demographics







 Address  [address] [place]  [address]

 

 Home Phone  [phone]

 

 Preferred Language  English

 

 Marital Status  

 

 [employer] Affiliation  Unknown

 

 Race  White

 

 Ethnic Group  Non-





Author







 Author  [facility]

 

 Organization  [employer]

 

 Address  Unknown

 

 Phone  Unavailable







Support







 Name  Relationship  Address  Phone

 

 Brooke Brown  [employer]  Unknown  [phone]







Care Team Providers







 Care Team Member Name  Role  Phone

 

 Josh Cunningham MD  PCP  Unavailable







Allergies







    



  Active Allergy   Reactions   Severity   Noted Date   Comments

 

    



  Levofloxacin   Swelling    2017 

 

    



  Sulfa (Sulfonamide     2017 



  Antibiotics)    







Current Medications







      



  Prescription   Sig.   Disp.   Refills   Start   End Date   Status



      Date  

 

      



  aspirin (ECOTRIN) 81 MG   Take 81 mg by mouth.       Active



  enteric coated tablet      

 

      



  amLODIPine (NORVASC) 5 mg   Take 5 mg by mouth once    3   20    Active



  tablet   daily.     17  

 

      



  clonIDINE HCl (CATAPRES)      20    Active



  0.2 MG tablet      17  

 

      



  levothyroxine (SYNTHROID,   TAKE 1 TABLET BY MOUTH ON    3   04/15/20    
Active



  LEVOXYL) 50 mcg tablet   AN EMPTY STOMACH     17  

 

      



  metoprolol succinate XL   Take 100 mg by mouth once    3   20    Active



  (TOPROL-XL) 100 mg 24 hr   daily.     17  



  tablet      

 

      



  pantoprazole (PROTONIX)   Take 40 mg by mouth once    3   20    Active



  40 MG EC tablet   daily.     17  

 

      



  HYDROcodone-acetaminophen   Take 1 tablet by mouth       Active



  (NORCO) 5-325 mg per   every 6 (six) hours as     



  tablet   needed for moderate pain.     







Active Problems







 



  Problem   Noted Date

 

 



  GERD (gastroesophageal reflux disease) 

 

 



  Substernal chest pain 







Encounters







    



  Date   Type   Specialty   Care Team   Description

 

    



  2017   Documentation   Gastroenterology   Chester Dodge MD 

 

    



  2017   [facility]   Radiology   Chester Dodge   Substernal 
chest pain



   Encounter    MD 

 

    



  2017   [facility]   Radiology   Chester Dodge   Substernal 
chest pain



   Encounter    MD 

 

    



  2017   Office Visit   Gastroenterology   Chester Dodge   
Substernal chest pain



     MD   (Primary Dx);



      Gastroesophageal reflux



      disease, esophagitis



      presence not specified



after 2017



Family History







   



  Relation   Name   Status   Comments

 

   



  Father     

 

   



  Mother     







Social History







    



  Tobacco Use   Types   Packs/Day   Years Used   Date

 

    



  Former Smoker    









   



  Alcohol Use   Drinks/Week   oz/Week   Comments

 

   



  No   









 



  Sex Assigned at Birth   Date Recorded

 

 



  Not on file 







Last Filed Vital Signs







  



  Vital Sign   Reading   Time Taken

 

  



  Blood Pressure   212/77   2017  2:08 PM CDT

 

  



  Pulse   90   2017  2:08 PM CDT

 

  



  Temperature   36.4   C (97.5   F)   2017  2:08 PM CDT

 

  



  Respiratory Rate   -   -

 

  



  Oxygen Saturation   -   -

 

  



  Inhaled Oxygen   -   -



  Concentration  

 

  



  Weight   51.3 kg (113 lb)   2017  2:08 PM CDT

 

  



  Height   -   -

 

  



  Body Mass Index   -   -







Plan of Treatment







   



  Health Maintenance   Due Date   Last Done   Comments

 

   



  SHINGRIX VACCINE (#1)   1984  

 

   



  ZOSTER VACCINE   1994  

 

   



  PNEUMOCOCCAL   1999  



  POLYSACCHARIDE VACCINE   



  AGE 65 AND OVER   

 

   



  PNEUMOCOCCAL-13   1999  

 

   



  INFLUENZA VACCINE   2018  







Results

* US Abdomen Complete (2017 12:18 PM)





 



  Specimen   Performing Laboratory

 

 



   [facility]



   [address] [place] [address]









 Narrative

 

 



EXAM: US ABDOMEN COMPLETE



 



CLINICAL DATA:R07.2 Precordial pain, ABDOMINAL PAIN



 



COMPARISON: Yet



 



IMPRESSION:



 



LIVER:The liver demonstrates normal echogenicity without focal mass or 
intrahepatic biliary ductal dilatation.



 



GALLBLADDER:The gallbladder is without evidence of calculi. The 
gallbladder wall is not thickened and there is no pericholecystic fluid.



 



CBD: 2.3 mm, within normal limits.



 



MPV:Demonstrated normal hepatopedal flow.0.98 cm diameter 



 



 



PANCREAS:The partially visualized portions of the pancreas are within 
normal limits.



 



SPLEEN:.Spleen is not enlarged.7.0 cm.



 



 



 



RIGHT KIDNEY: Right kidney demonstrated no mass or obstruction The right kidney 
measures 9.2cm. There are 2 renal cysts.



 



LEFT KIDNEY: Left kidney demonstrated no mass or obstruction The left kidney 
measures 7.7cm. There are 3 renal cysts with the largest measuring 5.7 cm..



 



 



 



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 
maximum diameter of 2.0 cm..



 



IVC:The visualized portions of the inferior vena cava are unremarkable.



 



ASCITES: No abnormal abdominal fluid collections are visualized. There is no 
evidence of ascites.



 



PLEURAL EFFUSION:There are no pleural effusions.



 



 



 



 



 



[facility]-[id number]



 









 Procedure Note

 

 



 Interface, Radiology Results Incoming - 2017  3:25 PM CDT



EXAM: US ABDOMEN COMPLETE



CLINICAL DATA:  R07.2 Precordial pain, ABDOMINAL PAIN



COMPARISON: Yet



IMPRESSION:



LIVER:  The liver demonstrates normal echogenicity without focal mass or 
intrahepatic biliary ductal dilatation.



  GALLBLADDER:  The gallbladder is without evidence of calculi. The gallbladder 
wall is not thickened and there is no pericholecystic fluid.



  CBD: 2.3 mm, within normal limits.



  MPV:  Demonstrated normal hepatopedal flow.  0.98 cm diameter 





PANCREAS:  The partially visualized portions of the pancreas are within normal 
limits.



SPLEEN:.  Spleen is not enlarged.  7.0 cm.







RIGHT KIDNEY: Right kidney demonstrated no mass or obstruction The right kidney 
measures 9.2cm. There are 2 renal cysts.



LEFT KIDNEY: Left kidney demonstrated no mass or obstruction The left kidney 
measures 7.7cm. There are 3 renal cysts with the largest measuring 5.7 cm..







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 
maximum diameter of 2.0 cm..

 

IVC:  The visualized portions of the inferior vena cava are unremarkable.



ASCITES: No abnormal abdominal fluid collections are visualized. There is no 
evidence of ascites.



PLEURAL EFFUSION:  There are no pleural effusions.





 





[facility]-[id number]







* FL Esophagram Complete (2017  4:00 PM)





 



  Specimen   Performing Laboratory

 

 



   [address] [address]









 Narrative

 

 



EXAMINATION:FL ESOPHAGRAM COMPLETE



 



CLINICAL HISTORY:R07.2 Precordial pain, Chest pain



 



COMPARISON:None.



 



Fluoroscopy time: 1.3 minute.



 



FINDINGS: 



The patient swallowed air producing granules and barium without difficulty. The 
esophagus demonstrates frequent tertiary contractions in the distal esophagus. 
No stricture or suspicious filling defect is seen. 



 



There is a moderate sliding hiatal hernia. 



 



 



IMPRESSION:



 



Moderate hiatal hernia.



 



Prominent tertiary contractions.



 



[facility]-[id number]



 









 Procedure Note

 

 



 Interface, Radiology Results Incoming - 2017  4:29 PM CDT



EXAMINATION:  FL ESOPHAGRAM COMPLETE



CLINICAL HISTORY:  R07.2 Precordial pain, Chest pain



COMPARISON:  None.



Fluoroscopy time: 1.3 minute.



FINDINGS: 

The patient swallowed air producing granules and barium without difficulty. The 
esophagus demonstrates frequent tertiary contractions in the distal esophagus. 
No stricture or suspicious filling defect is seen. 



There is a moderate sliding hiatal hernia. 





IMPRESSION:



Moderate hiatal hernia.



Prominent tertiary contractions.



[facility]-[id number]







after 2017



Insurance







     



  Payer   Benefit   Subscriber ID   Type   Phone   Address



   Plan /    



   Group    

 

     



  MEDICARE   MEDICARE   xxxxxxxxxxx   Medicare    [place], [place]



   PART A AND    



   B    

 

     



  GEHA   [employer]HA [employer]   xxxxxxxxxxxx   [employer]mercial  



   SUPPLEMENT    









     



  Guarantor Name   Account   Relation to   Date of   Phone   Billing Address



   Type   Patient   Birth  

 

     



  DANIEL ARTHUR VEE   Personal/F   Self   1934   Home:   [address] [place], [place] [phone]   [place] [phone]

## 2018-05-24 NOTE — XMS REPORT
Clinical Summary

 Created on: 2018



Daniel Alatorre

External Reference #: [id number]

: 1934

Sex: Female



Demographics







 Address  [address] [place]  [address]

 

 Home Phone  [phone]

 

 Preferred Language  English

 

 Marital Status  Unknown

 

 Orthodoxy Affiliation  Caodaism

 

 Race  White

 

 Ethnic Group  Non-





Author







 Author  [facility]

 

 [facility]

 

 Address  Unknown

 

 Phone  Unavailable







Support







 Name  Relationship  Address  Phone

 

 , Silvana Brown  Unknown  [phone]







Care Team Providers







 Care Team Member Name  Role  Phone

 

  PCP  Unavailable







Allergies







    



  Active Allergy   Reactions   Severity   Noted Date   Comments

 

    



  Sulfa (Sulfonamide   Anaphylaxis   High   2015 



  Antibiotics)    







Current Medications







      



  Prescription   Sig.   Disp.   Refills   Start   End Date   Status



      Date  

 

      



  metoprolol (TOPROL-XL)   Take 100 mg by mouth       Active



  100 MG 24 hr tablet   daily.     

 

      



  pantoprazole (PROTONIX)   Take 40 mg by mouth       Active



  40 MG tablet   daily.     

 

      



  aspirin 81 MG EC tablet   Take 81 mg by mouth       Active



   daily.     

 

      



  temazepam (RESTORIL) 30   Take 30 mg by mouth every       Active



  mg capsule   night as needed for     



   Sleep.     

 

      



  magnesium oxide 400 mg   Take by mouth. 1-3 x       Active



  Cap   daily     







Active Problems







 



  Problem   Noted Date

 

 



  Hyponatremia   2015







Encounters







    



  Date   Type   Specialty   Care Team   Description

 

    



  2017   Hospital   Cardiology   Jake Chery,   Peripheral 
vascular



   Encounter    MD   disease, unspecified



      (HCC)

 

    



  2017   Outside Orders   Central Scheduling   Jake Chery 
  Peripheral vascular



     MD   disease, unspecified



      (HCC) (Primary Dx)



after 2017



Social History







    



  Tobacco Use   Types   Packs/Day   Years Used   Date

 

    



  Former Smoker    









   



  Alcohol Use   Drinks/Week   oz/Week   Comments

 

   



  Yes   









 



  Sex Assigned at Birth   Date Recorded

 

 



  Not on file 







Last Filed Vital Signs

Not on file



Plan of Treatment





Not on file



Results

* PERIPHERAL VASCULAR REPORT - SCAN (2017  3:23 PM)

* Arterial Doppler Legs Bilateral (2017 10:08 AM)





  



  Component   Value   Ref Range

 

  



  Ejection Fraction  









 



  Specimen   Performing Laboratory

 

 



   [facility] ECHO HEARTLAB MKCKESSON CPACS









 Impressions

 

 



Right Impression



1. The common femoral, profunda femoral, superficial femoral, popliteal,



posterior tibial, peroneal and anterior tibial arteries are patent with



biphasic Doppler waveforms throughout.



2. There is > 50 % stenosis of the common femoral and proximal superficial



femoral arteries with elevated velocities of 283 cm/s and 213 cm/s.



3. The PT pressure is 158 mmHg with an DELMY of 1.01(normal range) and the DP



pressure is 137 mmHg with an DELMY of 0.86 (mild obstruction range).



4. The great toe pressure is 71 mmHg with an abnormal TBI of 0.46.



5. The digits have adequate flow by PPG waveforms.



Left Impression



1. The common femoral, superficial femoral, popliteal and peroneal arteries



are patent with biphasic Doppler waveforms throughout.



2. The posterior tibial and anterior tibial arteries have monophasic



waveforms.



3. The PT pressure is 102 mmHg with an DELMY of 0.65 (severe obstruction



range)and the DP pressure is 88 mmHg with an DELMY of 0.56 (severe obstruction



range.



4. The great toe pressure is 46 mmHg with an abnormal TBI of 0.29.



5. The digits have adequate flow by PPG waveforms.



 



 Conclusions



 



 Summary



 



 Arterial pressures and Doppler analysis were performed bilaterally. The



 arteries were adequately visualized. On the right, the common femoral,



 profunda femoral, superficial femoral, popliteal, posterior tibial,



 peroneal and anterior tibial arteries were patent with biphasic Doppler



 waveforms throughout. There was stenosis of the common femoral and



 proximal superficial femoral artery. The DELMY's were in the normal to mild



 obstruction range. The TBI was abnormal. The digits had adequate flow by



 PPG. On the left, the common femoral, superficial femoral, popliteal and



 peroneal arteries were patent with biphasic Doppler waveforms throughout.



 The posterior tibial and anterior tibial arteries had monophasic



 waveforms. The DELMY's were in the severe obstruction range. The TBI was



 abnormal and the digits had adequate flow by PPG.



 



 Signature



 



 ----------------------------------------------------------------



 Electronically signed by BIANKA Shepard MD,



 JOSIAS(Interpreting physician) on 2017 02:53 PM



 ----------------------------------------------------------------



 



Velocities are measured in cm/s ; Diameters are measured in cm



 



LE Duplex Measurements



 




Right

Left



 



 +--------------------------------------------------------------+ +-------------
------+------------------+-----------------------+ +------------------+---------
--------+-------------------------+



 !Location
! !PSV!EDV
 !Waveform ! !PSV
 !EDV!Waveform
 !



 +--------------------------------------------------------------+ +-------------
------+------------------+-----------------------+ +------------------+---------
--------+-------------------------+



 !Mid Common Femoral
! !283!
!Biphasic ! !128
 ! !Biphasic
 !



 +--------------------------------------------------------------+ +-------------
------+------------------+-----------------------+ +------------------+---------
--------+-------------------------+



 !Prox PFA
! !187!
!Biphasic ! !105
 ! !Monophasic
 !



 +--------------------------------------------------------------+ +-------------
------+------------------+-----------------------+ +------------------+---------
--------+-------------------------+



 !Prox SFA
! !213!
!Biphasic ! !108
 ! !Biphasic
 !



 +--------------------------------------------------------------+ +-------------
------+------------------+-----------------------+ +------------------+---------
--------+-------------------------+



 !Mid SFA
 ! !140!
!Biphasic ! !124
 ! !Biphasic
 !



 +--------------------------------------------------------------+ +-------------
------+------------------+-----------------------+ +------------------+---------
--------+-------------------------+



 !Dist SFA
! !97.4 !
!Biphasic ! !98.2
! !Biphasic
 !



 +--------------------------------------------------------------+ +-------------
------+------------------+-----------------------+ +------------------+---------
--------+-------------------------+



 !Prox Popliteal
! !84.1 !
!Biphasic ! !65.7
! !Biphasic
 !



 +--------------------------------------------------------------+ +-------------
------+------------------+-----------------------+ +------------------+---------
--------+-------------------------+



 !Dist Popliteal
! !103!
!Biphasic ! !61
! !Biphasic
 !



 +--------------------------------------------------------------+ +-------------
------+------------------+-----------------------+ +------------------+---------
--------+-------------------------+



 !Prox PTA
! !87.2 !
!Biphasic ! !51.1
! !Biphasic
 !



 +--------------------------------------------------------------+ +-------------
------+------------------+-----------------------+ +------------------+---------
--------+-------------------------+



 !Mid PTA
 ! !91.9 !
!Biphasic ! !63.9
! !Monophasic
 !



 +--------------------------------------------------------------+ +-------------
------+------------------+-----------------------+ +------------------+---------
--------+-------------------------+



 !Dist PTA
! !92.7 !
!Biphasic ! !65.1
! !Monophasic
 !



 +--------------------------------------------------------------+ +-------------
------+------------------+-----------------------+ +------------------+---------
--------+-------------------------+



 !Prox UMESH
! !92.6 !
!Biphasic ! !57
! !Monophasic
 !



 +--------------------------------------------------------------+ +-------------
------+------------------+-----------------------+ +------------------+---------
--------+-------------------------+



 !Mid UMESH
 ! !108!
!Biphasic ! !82.5
! !Monophasic
 !



 +--------------------------------------------------------------+ +-------------
------+------------------+-----------------------+ +------------------+---------
--------+-------------------------+



 !Dist UMESH
! !86.2 !
!Biphasic ! !31
! !Monophasic
 !



 +--------------------------------------------------------------+ +-------------
------+------------------+-----------------------+ +------------------+---------
--------+-------------------------+



 !Prox Peroneal
 ! !68.6 !
!Biphasic ! !36.1
! !Biphasic
 !



 +--------------------------------------------------------------+ +-------------
------+------------------+-----------------------+ +------------------+---------
--------+-------------------------+



 !Dist Peroneal
 ! !56.3 !
!Biphasic ! !32.2
! !Biphasic
 !



 +--------------------------------------------------------------+ +-------------
------+------------------+-----------------------+ +------------------+---------
--------+-------------------------+



 









 Narrative

 

 



PV LAB - Lower Extremity Arterial Duplex



 



 Demographics



 



 Patient NameDANIEL ALATORRE Date of Study2017



 JONO



 



 MRN [id number] Age
82



 



 Visit [id number] Gender 
Female



 



 Accession [id number] Date of Birth



 Number



 



 Referring [facility] Room Number



 Physician GIRISH



 



 Sonographer Adrian Bender.Interpreting BIANKA Shepard RVT, ARDMS Physician
MD, [facility]



 



Procedure



Type of Study:



 



 Extremities Arteries: Lower Extremities Arterial Duplex, ARTERIAL DOPPLER



 LEGS, BILATERAL.



 



Indications for Study:PVD.



 



Patient Status:Routine.



Study Location:Vascular Lab.



Technical Quality:Adequate visualization.



 









 Procedure Note

 

 



Interface, External Ris In - 2017  2:53 PM CDT



PV LAB - Lower Extremity Arterial Duplex



 Demographics

 

 Patient Name    DANIEL ALATORRE   Date of Study    2017

                 JONO

 

 MRN             [id number]             Age              82

 

 Visit Number    [id number]           Gender           Female

 

 Accession       [id number]             Date of Birth    1934

 Number

 

 Referring       [facility] Room Number

 Physician STOUT

 

 Sonographer     Adrian Bender.    Interpreting     BIANKA Shepard RVT, ARDMS           Physician        MD, [facility]

 

Procedure

Type of Study:

 

 Extremities Arteries: Lower Extremities Arterial Duplex, ARTERIAL DOPPLER

 LEGS, BILATERAL.

 

Indications for Study:PVD.



Patient Status:Routine.

Study Location:Vascular Lab.

Technical Quality:Adequate visualization.



Impressions

Right Impression

 1. The common femoral, profunda femoral, superficial femoral, popliteal,

posterior tibial, peroneal and anterior tibial arteries are patent with

biphasic Doppler waveforms throughout.

 2. There is > 50 % stenosis of the common femoral and proximal superficial

femoral arteries with elevated velocities of 283 cm/s and 213 cm/s.

 3. The PT pressure is 158 mmHg with an DELMY of 1.01(normal range) and the DP

pressure is 137 mmHg with an DELMY of 0.86 (mild obstruction range).

 4. The great toe pressure is 71 mmHg with an abnormal TBI of 0.46.

 5. The digits have adequate flow by PPG waveforms.

Left Impression

 1. The common femoral, superficial femoral, popliteal and peroneal arteries

are patent with biphasic Doppler waveforms throughout.

 2. The posterior tibial and anterior tibial arteries have monophasic

waveforms.

 3. The PT pressure is 102 mmHg with an DELMY of 0.65 (severe obstruction

range)and the DP pressure is 88 mmHg with an DELMY of 0.56 (severe obstruction

range.

 4. The great toe pressure is 46 mmHg with an abnormal TBI of 0.29.

 5. The digits have adequate flow by PPG waveforms.



 Conclusions

 

 Summary

 

 Arterial pressures and Doppler analysis were performed bilaterally. The

 arteries were adequately visualized. On the right, the common femoral,

 profunda femoral, superficial femoral, popliteal, posterior tibial,

 peroneal and anterior tibial arteries were patent with biphasic Doppler

 waveforms throughout. There was stenosis of the common femoral and

 proximal superficial femoral artery. The DELMY's were in the normal to mild

 obstruction range. The TBI was abnormal. The digits had adequate flow by

 PPG. On the left, the common femoral, superficial femoral, popliteal and

 peroneal arteries were patent with biphasic Doppler waveforms throughout.

 The posterior tibial and anterior tibial arteries had monophasic

 waveforms. The DELMY's were in the severe obstruction range. The TBI was

 abnormal and the digits had adequate flow by PPG.

 

 Signature

 

 ----------------------------------------------------------------

 Electronically signed by BIANKA Shepard MD,

 RPVI(Interpreting physician) on 2017 02:53 PM

 ----------------------------------------------------------------

 

Velocities are measured in cm/s ; Diameters are measured in cm



LE Duplex Measurements



                                                                  Right        
                                                    Left

 

 +--------------------------------------------------------------+ +-------------
------+------------------+-----------------------+ +------------------+---------
--------+-------------------------+

 !Location                                                      ! !PSV         
       !EDV               !Waveform               ! !PSV               !EDV    
          !Waveform                 !

 +--------------------------------------------------------------+ +-------------
------+------------------+-----------------------+ +------------------+---------
--------+-------------------------+

 !Mid Common Femoral                                            ! !283         
       !                  !Biphasic               ! !128               !       
          !Biphasic                 !

 +--------------------------------------------------------------+ +-------------
------+------------------+-----------------------+ +------------------+---------
--------+-------------------------+

 !Prox PFA                                                      ! !187         
       !                  !Biphasic               ! !105               !       
          !Monophasic               !

 +--------------------------------------------------------------+ +-------------
------+------------------+-----------------------+ +------------------+---------
--------+-------------------------+

 !Prox SFA                                                      ! !213         
       !                  !Biphasic               ! !108               !       
          !Biphasic                 !

 +--------------------------------------------------------------+ +-------------
------+------------------+-----------------------+ +------------------+---------
--------+-------------------------+

 !Mid SFA                                                       ! !140         
       !                  !Biphasic               ! !124               !       
          !Biphasic                 !

 +--------------------------------------------------------------+ +-------------
------+------------------+-----------------------+ +------------------+---------
--------+-------------------------+

 !Dist SFA                                                      ! !97.4        
       !                  !Biphasic               ! !98.2              !       
          !Biphasic                 !

 +--------------------------------------------------------------+ +-------------
------+------------------+-----------------------+ +------------------+---------
--------+-------------------------+

 !Prox Popliteal                                                ! !84.1        
       !                  !Biphasic               ! !65.7              !       
          !Biphasic                 !

 +--------------------------------------------------------------+ +-------------
------+------------------+-----------------------+ +------------------+---------
--------+-------------------------+

 !Dist Popliteal                                                ! !103         
       !                  !Biphasic               ! !61                !       
          !Biphasic                 !

 +--------------------------------------------------------------+ +-------------
------+------------------+-----------------------+ +------------------+---------
--------+-------------------------+

 !Prox PTA                                                      ! !87.2        
       !                  !Biphasic               ! !51.1              !       
          !Biphasic                 !

 +--------------------------------------------------------------+ +-------------
------+------------------+-----------------------+ +------------------+---------
--------+-------------------------+

 !Mid PTA                                                       ! !91.9        
       !                  !Biphasic               ! !63.9              !       
          !Monophasic               !

 +--------------------------------------------------------------+ +-------------
------+------------------+-----------------------+ +------------------+---------
--------+-------------------------+

 !Dist PTA                                                      ! !92.7        
       !                  !Biphasic               ! !65.1              !       
          !Monophasic               !

 +--------------------------------------------------------------+ +-------------
------+------------------+-----------------------+ +------------------+---------
--------+-------------------------+

 !Prox UMESH                                                      ! !92.6        
       !                  !Biphasic               ! !57                !       
          !Monophasic               !

 +--------------------------------------------------------------+ +-------------
------+------------------+-----------------------+ +------------------+---------
--------+-------------------------+

 !Mid UMESH                                                       ! !108         
       !                  !Biphasic               ! !82.5              !       
          !Monophasic               !

 +--------------------------------------------------------------+ +-------------
------+------------------+-----------------------+ +------------------+---------
--------+-------------------------+

 !Dist UMESH                                                      ! !86.2        
       !                  !Biphasic               ! !31                !       
          !Monophasic               !

 +--------------------------------------------------------------+ +-------------
------+------------------+-----------------------+ +------------------+---------
--------+-------------------------+

 !Prox Peroneal                                                 ! !68.6        
       !                  !Biphasic               ! !36.1              !       
          !Biphasic                 !

 +--------------------------------------------------------------+ +-------------
------+------------------+-----------------------+ +------------------+---------
--------+-------------------------+

 !Dist Peroneal                                                 ! !56.3        
       !                  !Biphasic               ! !32.2              !       
          !Biphasic                 !

 +--------------------------------------------------------------+ +-------------
------+------------------+-----------------------+ +------------------+---------
--------+-------------------------+

 





after 2017

## 2018-05-24 NOTE — XMS REPORT
Clinical Summary

 Created on: 2018



Daniel Arthur

External Reference #: [id number]

: 1934

Sex: Female



Demographics







 Address  [address] [place]  [address]

 

 Home Phone  [phone]

 

 Preferred Language  English

 

 Marital Status  

 

 Yarsani Affiliation  Unknown

 

 Race  White

 

 Ethnic Group  Non-





Author







 Author  [facility]

 

 Organization  [facility]

 

 Address  Unknown

 

 Phone  Unavailable







Support







 Name  Relationship  Address  Phone

 

 Brooke Brown  [employer]  Unknown  [phone]







Care Team Providers







 Care Team Member Name  Role  Phone

 

 Josh Cunningham MD  PCP  Unavailable







Allergies







    



  Active Allergy   Reactions   Severity   Noted Date   Comments

 

    



  Levofloxacin   Swelling    2017 

 

    



  Sulfa (Sulfonamide     2017 



  Antibiotics)    







Current Medications







      



  Prescription   Sig.   Disp.   Refills   Start   End Date   Status



      Date  

 

      



  aspirin (ECOTRIN) 81 MG   Take 81 mg by mouth.       Active



  enteric coated tablet      

 

      



  amLODIPine (NORVASC) 5 mg   Take 5 mg by mouth once    3   20    Active



  tablet   daily.     17  

 

      



  clonIDINE HCl (CATAPRES)      20    Active



  0.2 MG tablet      17  

 

      



  levothyroxine (SYNTHROID,   TAKE 1 TABLET BY MOUTH ON    3   04/15/20    
Active



  LEVOXYL) 50 mcg tablet   AN EMPTY STOMACH     17  

 

      



  metoprolol succinate XL   Take 100 mg by mouth once    3   20    Active



  (TOPROL-XL) 100 mg 24 hr   daily.     17  



  tablet      

 

      



  pantoprazole (PROTONIX)   Take 40 mg by mouth once    3   20    Active



  40 MG EC tablet   daily.     17  

 

      



  HYDROcodone-acetaminophen   Take 1 tablet by mouth       Active



  (NORCO) 5-325 mg per   every 6 (six) hours as     



  tablet   needed for moderate pain.     







Active Problems







 



  Problem   Noted Date

 

 



  GERD (gastroesophageal reflux disease) 

 

 



  Substernal chest pain 







Encounters







    



  Date   Type   Specialty   Care Team   Description

 

    



  2017   Documentation   Gastroenterology   Chester Dodge MD 

 

    



  2017   [facility]   Radiology   Chester Dodge   Substernal 
chest pain



   Encounter    MD 

 

    



  2017   [facility]   Radiology   Chester Dodge   Substernal 
chest pain



   Encounter    MD 

 

    



  2017   Office Visit   Gastroenterology   Chester Dodge   
Substernal chest pain



     MD   (Primary Dx);



      Gastroesophageal reflux



      disease, esophagitis



      presence not specified



after 2017



Family History







   



  Relation   Name   Status   Comments

 

   



  Father     

 

   



  Mother     







Social History







    



  Tobacco Use   Types   Packs/Day   Years Used   Date

 

    



  Former Smoker    









   



  Alcohol Use   Drinks/Week   oz/Week   Comments

 

   



  No   









 



  Sex Assigned at Birth   Date Recorded

 

 



  Not on file 







Last Filed Vital Signs







  



  Vital Sign   Reading   Time Taken

 

  



  Blood Pressure   212/77   2017  2:08 PM CDT

 

  



  Pulse   90   2017  2:08 PM CDT

 

  



  Temperature   36.4   C (97.5   F)   2017  2:08 PM CDT

 

  



  Respiratory Rate   -   -

 

  



  Oxygen Saturation   -   -

 

  



  Inhaled Oxygen   -   -



  Concentration  

 

  



  Weight   51.3 kg (113 lb)   2017  2:08 PM CDT

 

  



  Height   -   -

 

  



  Body Mass Index   -   -







Plan of Treatment







   



  Health Maintenance   Due Date   Last Done   Comments

 

   



  SHINGRIX VACCINE (#1)   1984  

 

   



  ZOSTER VACCINE   1994  

 

   



  PNEUMOCOCCAL   1999  



  POLYSACCHARIDE VACCINE   



  AGE 65 AND OVER   

 

   



  PNEUMOCOCCAL-13   1999  

 

   



  INFLUENZA VACCINE   2018  







Results

* US Abdomen Complete (2017 12:18 PM)





 



  Specimen   Performing Laboratory

 

 



   [facility]



   [address] [place] [address]









 Narrative

 

 



EXAM: US ABDOMEN COMPLETE



 



CLINICAL DATA:R07.2 Precordial pain, ABDOMINAL PAIN



 



COMPARISON: Yet



 



IMPRESSION:



 



LIVER:The liver demonstrates normal echogenicity without focal mass or 
intrahepatic biliary ductal dilatation.



 



GALLBLADDER:The gallbladder is without evidence of calculi. The 
gallbladder wall is not thickened and there is no pericholecystic fluid.



 



CBD: 2.3 mm, within normal limits.



 



MPV:Demonstrated normal hepatopedal flow.0.98 cm diameter 



 



 



PANCREAS:The partially visualized portions of the pancreas are within 
normal limits.



 



SPLEEN:.Spleen is not enlarged.7.0 cm.



 



 



 



RIGHT KIDNEY: Right kidney demonstrated no mass or obstruction The right kidney 
measures 9.2cm. There are 2 renal cysts.



 



LEFT KIDNEY: Left kidney demonstrated no mass or obstruction The left kidney 
measures 7.7cm. There are 3 renal cysts with the largest measuring 5.7 cm..



 



 



 



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 
maximum diameter of 2.0 cm..



 



IVC:The visualized portions of the inferior vena cava are unremarkable.



 



ASCITES: No abnormal abdominal fluid collections are visualized. There is no 
evidence of ascites.



 



PLEURAL EFFUSION:There are no pleural effusions.



 



 



 



 



 



[facility]-[id number]



 









 Procedure Note

 

 



 Interface, Radiology Results Incoming - 2017  3:25 PM CDT



EXAM: US ABDOMEN COMPLETE



CLINICAL DATA:  R07.2 Precordial pain, ABDOMINAL PAIN



COMPARISON: Yet



IMPRESSION:



LIVER:  The liver demonstrates normal echogenicity without focal mass or 
intrahepatic biliary ductal dilatation.



  GALLBLADDER:  The gallbladder is without evidence of calculi. The gallbladder 
wall is not thickened and there is no pericholecystic fluid.



  CBD: 2.3 mm, within normal limits.



  MPV:  Demonstrated normal hepatopedal flow.  0.98 cm diameter 





PANCREAS:  The partially visualized portions of the pancreas are within normal 
limits.



SPLEEN:.  Spleen is not enlarged.  7.0 cm.







RIGHT KIDNEY: Right kidney demonstrated no mass or obstruction The right kidney 
measures 9.2cm. There are 2 renal cysts.



LEFT KIDNEY: Left kidney demonstrated no mass or obstruction The left kidney 
measures 7.7cm. There are 3 renal cysts with the largest measuring 5.7 cm..







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 
maximum diameter of 2.0 cm..

 

IVC:  The visualized portions of the inferior vena cava are unremarkable.



ASCITES: No abnormal abdominal fluid collections are visualized. There is no 
evidence of ascites.



PLEURAL EFFUSION:  There are no pleural effusions.





 





[facility]-[id number]







* FL Esophagram Complete (2017  4:00 PM)





 



  Specimen   Performing Laboratory

 

 



   [address] [address]









 Narrative

 

 



EXAMINATION:FL ESOPHAGRAM COMPLETE



 



CLINICAL HISTORY:R07.2 Precordial pain, Chest pain



 



COMPARISON:None.



 



Fluoroscopy time: 1.3 minute.



 



FINDINGS: 



The patient swallowed air producing granules and barium without difficulty. The 
esophagus demonstrates frequent tertiary contractions in the distal esophagus. 
No stricture or suspicious filling defect is seen. 



 



There is a moderate sliding hiatal hernia. 



 



 



IMPRESSION:



 



Moderate hiatal hernia.



 



Prominent tertiary contractions.



 



[facility]-[id number]



 









 Procedure Note

 

 



 Interface, Radiology Results Incoming - 2017  4:29 PM CDT



EXAMINATION:  FL ESOPHAGRAM COMPLETE



CLINICAL HISTORY:  R07.2 Precordial pain, Chest pain



COMPARISON:  None.



Fluoroscopy time: 1.3 minute.



FINDINGS: 

The patient swallowed air producing granules and barium without difficulty. The 
esophagus demonstrates frequent tertiary contractions in the distal esophagus. 
No stricture or suspicious filling defect is seen. 



There is a moderate sliding hiatal hernia. 





IMPRESSION:



Moderate hiatal hernia.



Prominent tertiary contractions.



[facility]-[id number]







after 2017



Insurance







     



  Payer   Benefit   Subscriber ID   Type   Phone   Address



   Plan /    



   Group    

 

     



  MEDICARE   MEDICARE   xxxxxxxxxxx   Medicare    [place], [place]



   PART A AND    



   B    

 

     



  GEHA   GEHA MED   xxxxxxxxxxxx   [employer]mercial  



   SUPPLEMENT    









     



  Guarantor Name   Account   Relation to   Date of   Phone   Billing Address



   Type   Patient   Birth  

 

     



  DANIEL ARTHUR VEE   Personal/F   Self   1934   Home:   [address] [place], [place] [phone]   [place] [id number]

## 2018-05-24 NOTE — OPERATIVE REPORT
DATE OF PROCEDURE:  May 24, 2018 

 

This procedure done as an emergency. The radiologist left at 3 p.m., and 

Dr. Lynne on call and refuses to come and would not be able to show up 

until 8 p.m. Patient has a life-threatening hyperkalemia with acute kidney 

injury, severe metabolic acidosis and hypotensive. Discussed with patient, 

discussed with family. Consent obtained. Thereafter, area over right 

femoral vein prepped and draped and rendered sterile. Thereafter, 1% 

lidocaine used to infiltrate skin and subcutaneous tissue. Thereafter, 

18-gauge needle used to cannulate right femoral vein. Thereafter, flexible 

J-tipped guide wire introduced through the needle into the inferior vena 

cava without any resistance. The needle was withdrawn, exit site dilated. 

Thereafter, 19.5-cm Mahurkar dialysis catheter placed over a wire using 

Seldinger technique. Wire pulled. Good blood return noted both ports. Both 

ports aspirated, flushed. Line secured. Patient tolerated the procedure 

well. No complications noted. Procedure done under glove, gown, mask, 

sterile drapes and all aseptic measures. 











DD:  05/24/2018 18:22

DT:  05/24/2018 18:34

Job#:  [id number]

## 2018-05-24 NOTE — CONSULTATION
DATE OF CONSULTATION:  May 24, 2018 



HISTORY OF PRESENT ILLNESS: This is an 83-year-old female accompanied by 

her daughter who is known to me from prior admission.  She was recently 

here had endoscopy done.  So went home and had relentless diarrhea.  It is 

"non-stop" according to her daughter.  She comes into the hospital with 

dehydration and weakness.  Found to have life-threatening hyperkalemia and 

acute kidney injury.  She is currently laying supine, following commands, 

feels weak because of all the diarrhea.  She has had previous history of 

urinary tract infection, prior colitis, diverticulosis, recent colonoscopy, 

history of Pseudomonas urinary tract infection, iron deficiency anemia, 

acute-on-chronic kidney failure, acute-on-chronic diastolic heart failure, 

prior DVT and PE, history of hypertension and sinus tachycardia. 



ALLERGIES:  STATINS, ______, INHIBITORS, SULFA AND LEVAQUIN.  



CURRENT MEDICATIONS:  The patient was given 2 grams of magnesium sulfate, 

was given urgent treatment for hyperkalemia with insulin, calcium and 

dextrose.  Currently receiving normal saline at 50 mL an hour, which I am 

going to change to 100 mL an hour.  



SOCIAL HISTORY:  Does not smoke or drink.



FAMILY HISTORY:  Significant for hypertension.  



Current chest x-ray noted.  



Current labs showing white count 8.24.  Hemoglobin 11.3.  Sodium 136.  

Potassium 6.6.  Bicarbonate 12.  Creatinine 7.83.  With a glucose 47.  

Magnesium 0.9.  BNP 1004. 

 

PHYSICAL EXAMINATION:  

VITALS:  Blood pressure 143/96, pulse rate 72, afebrile.  Oxygen saturation 

95%.  

HEAD AND NECK:  Conjunctivae somewhat pale.  Oral mucosa dry.  Neck veins 

flat. 

LUNGS:  Relatively clear.  No rales. 

HEART:  S1 and S2 audible.  Significant 2-3/6 ejection systolic murmur 

lower left sternal border. 

ABDOMEN:  Otherwise soft and nontender.  

LOWER EXTREMITY EXAMINATION:  2+ lower extremity edema. 



IMPRESSION AND PLAN:  Life-threatening hyperkalemia with hyperacute T waves 

on ECG.  Discussed with the ER physician.  Will need a stat dialysis 

catheter.  Radiology paged at 3:06 p.m. stat for stat dialysis catheter, 

triple-lumen in.  The meantime, will change IV fluid to D5 with 3 amps of 

sodium bicarbonate at 100 mL an hour.  As soon as dialysis catheter is 

placed, patient will be requiring dialysis.  Magnesium has been replaced.  

Severe nutrition deficiency with severe electrolyte abnormalities.  

Underlying acute tubular necrosis.  Will have the nurse place a Shultz 

catheter.  Strict intake and output.  Discontinue normal saline and start 

IV bicarbonate.  No evidence of congestive heart failure.  The patient will 

be transferred to the ICU.



 





DD:  05/24/2018 16:01

DT:  05/24/2018 18:12

Job#:  [id number]

## 2018-05-24 NOTE — DIAGNOSTIC IMAGING REPORT
PROCEDURE:US RETROPERITONEAL ( KIDNEY ).

COMPARISON:Patients [facility], CT, CTA ABD/PEL/RUN OFF, 

12/01/2017, 20:14.

INDICATIONS:ARF

TECHNIQUE: Grey-scale and color sonographic images of the bilateral 

kidneys and bladder where obtained in transverse and longitudinal 

planes.

 

FINDINGS:

 

RIGHT KIDNEY: 9.2 cm, cortex 1.6 cm

Cysts: 4.1 x 2.9 x 3.1 cm mostly anechoic lesion in the superior to mid 

aspect, which contains a thin internal nonvascular septation 

(previously measured approximately 3.7 x 3.1 x 3.1 cm on CT).

1.8 x 1.7 x 1.7 cm cystic, anechoic lesion in the mid aspect 

(previously measured 1.9 x 1.6 cm on CT).

Solid masses: None

Stones: None

Hydronephrosis: None

Echogenicity: Increased

 

LEFT KIDNEY: 9.0 cm, cortex 1.3 cm

Cysts: 8.2 x 5.1 x 7.0 cm cystic, anechoic mostly exophytic lesion 

arising from the mid to inferior aspect (previously measured 6.1 x 6.3 

x 6.4 cm on CT).

3.7 x 3.2 x 4.0 cm partly exophytic cystic, anechoic lesion in the 

superior to mid aspect which contains a thin nonvascular septation 

(previously measured 2.8 x 2.4 x 2.5 cm on CT) 

Solid masses: None

Stones: None

Hydronephrosis: None

Echogenicity: Increased

 

Bladder: Decompressed, with Shultz catheter in place.

 

CONCLUSION:

1. Bilateral renal size in the lower limit of normal. Bilateral 

increased renal cortical echogenicity, consistent with medical renal 

disease. No hydronephrosis, stones, or obstruction.

2. Interval increase in size of 4.1 cm minimally complex cyst in the 

superior to mid right kidney, 4.0 cm minimally complex cyst in the 

superior to mid left kidney, and 8.2 cm simple cyst in the mid to 

inferior left kidney, when accounting for differences in modality.

3. Stable simple cyst in the mid right kidney. 

 

 

Emerson Davis M.D.  

Dictated by:  Emerson Davis M.D. on 5/24/2018 at 18:08     

Electronically approved by:  Emerson Davis M.D. on 5/24/2018 at 

18:08

## 2018-05-24 NOTE — DIAGNOSTIC IMAGING REPORT
SINGLE VIEW CHEST, 5/24/2018



Clinical History: Line placement.

Technique: Single, portable AP view chest.

Comparison: May 7, 2018; May 9, 2018



Findings:

See impression.



Impression:

1.  Right internal jugular central venous catheter tip at the atrial caval

junction.

2.  No pneumothorax.

3.  Hyperinflation consistent with air trapping from COPD.

4.  Normal heart size.

5.  Mild central vascular congestion.

6.  Right upper lobe airspace opacity, decreased in volume relative to May 7,

2018 consistent with improving/resolving infection or atelectasis.







This report was generated with voice-recognition technology. Errors in

transcription can occur. Please interpret accordingly and contact a radiologist

if there are any questions regarding the report.



Signed by: Dr. Andrea Lynne M.D. on 5/24/2018 7:56 PM

## 2018-05-24 NOTE — DIAGNOSTIC IMAGING REPORT
PROCEDURE:

A single AP view of the chest.

 

COMPARISON: Patients [facility], DX, CHEST SINGLE (PORTABLE), 

4/21/2018, 17:27.  Patients [facility], DX, CHEST SINGLE 

(PORTABLE), 5/07/2018, 18:34.  Patients [facility], DX, CHEST 2 

VIEWS, 5/09/2018, 6:17.

 

INDICATIONS:   CHRONIC HIGH BLOOD PRESSURE, KIDNEY PROBLEMS

     

FINDINGS:

Lines/tubes:  None.

 

Lungs: Interval improvement in patchy and nodular airspace opacity in 

the lateral aspect of the right upper lobe.

No consolidation or pulmonary edema.

 

Pleura:  There is no pleural effusion or pneumothorax.

 

Heart and mediastinum: Cardiac silhouette is unremarkable. Pulmonary 

vasculature is normal.

 

Bones: Stable rightward curvature of the thoracic spine with associated 

degenerative changes.

 

IMPRESSION: 

 

1. improvement in right upper lobe opacity, consistent with improved 

pneumonia 

 

 

Emerson Davis M.D.  

Dictated by:  Emerson Davis M.D. on 5/24/2018 at 12:35     

Electronically approved by:  Emerson Davis M.D. on 5/24/2018 at 

12:35

## 2018-05-25 VITALS — SYSTOLIC BLOOD PRESSURE: 128 MMHG | DIASTOLIC BLOOD PRESSURE: 68 MMHG

## 2018-05-25 VITALS — SYSTOLIC BLOOD PRESSURE: 129 MMHG | DIASTOLIC BLOOD PRESSURE: 77 MMHG

## 2018-05-25 VITALS — DIASTOLIC BLOOD PRESSURE: 56 MMHG | SYSTOLIC BLOOD PRESSURE: 119 MMHG

## 2018-05-25 VITALS — SYSTOLIC BLOOD PRESSURE: 117 MMHG | DIASTOLIC BLOOD PRESSURE: 106 MMHG

## 2018-05-25 VITALS — DIASTOLIC BLOOD PRESSURE: 79 MMHG | SYSTOLIC BLOOD PRESSURE: 129 MMHG

## 2018-05-25 VITALS — SYSTOLIC BLOOD PRESSURE: 128 MMHG | DIASTOLIC BLOOD PRESSURE: 75 MMHG

## 2018-05-25 VITALS — DIASTOLIC BLOOD PRESSURE: 48 MMHG | SYSTOLIC BLOOD PRESSURE: 123 MMHG

## 2018-05-25 VITALS — DIASTOLIC BLOOD PRESSURE: 47 MMHG | SYSTOLIC BLOOD PRESSURE: 133 MMHG

## 2018-05-25 VITALS — SYSTOLIC BLOOD PRESSURE: 135 MMHG | DIASTOLIC BLOOD PRESSURE: 67 MMHG

## 2018-05-25 VITALS — DIASTOLIC BLOOD PRESSURE: 56 MMHG | SYSTOLIC BLOOD PRESSURE: 125 MMHG

## 2018-05-25 VITALS — SYSTOLIC BLOOD PRESSURE: 138 MMHG | DIASTOLIC BLOOD PRESSURE: 86 MMHG

## 2018-05-25 VITALS — DIASTOLIC BLOOD PRESSURE: 50 MMHG | SYSTOLIC BLOOD PRESSURE: 128 MMHG

## 2018-05-25 VITALS — SYSTOLIC BLOOD PRESSURE: 119 MMHG | DIASTOLIC BLOOD PRESSURE: 49 MMHG

## 2018-05-25 VITALS — DIASTOLIC BLOOD PRESSURE: 69 MMHG | SYSTOLIC BLOOD PRESSURE: 147 MMHG

## 2018-05-25 VITALS — DIASTOLIC BLOOD PRESSURE: 49 MMHG | SYSTOLIC BLOOD PRESSURE: 119 MMHG

## 2018-05-25 VITALS — SYSTOLIC BLOOD PRESSURE: 122 MMHG | DIASTOLIC BLOOD PRESSURE: 54 MMHG

## 2018-05-25 VITALS — DIASTOLIC BLOOD PRESSURE: 51 MMHG | SYSTOLIC BLOOD PRESSURE: 119 MMHG

## 2018-05-25 VITALS — DIASTOLIC BLOOD PRESSURE: 53 MMHG | SYSTOLIC BLOOD PRESSURE: 131 MMHG

## 2018-05-25 VITALS — SYSTOLIC BLOOD PRESSURE: 129 MMHG | DIASTOLIC BLOOD PRESSURE: 79 MMHG

## 2018-05-25 VITALS — SYSTOLIC BLOOD PRESSURE: 134 MMHG | DIASTOLIC BLOOD PRESSURE: 66 MMHG

## 2018-05-25 VITALS — DIASTOLIC BLOOD PRESSURE: 54 MMHG | SYSTOLIC BLOOD PRESSURE: 121 MMHG

## 2018-05-25 VITALS — SYSTOLIC BLOOD PRESSURE: 136 MMHG | DIASTOLIC BLOOD PRESSURE: 57 MMHG

## 2018-05-25 VITALS — SYSTOLIC BLOOD PRESSURE: 151 MMHG | DIASTOLIC BLOOD PRESSURE: 62 MMHG

## 2018-05-25 VITALS — SYSTOLIC BLOOD PRESSURE: 121 MMHG | DIASTOLIC BLOOD PRESSURE: 50 MMHG

## 2018-05-25 VITALS — SYSTOLIC BLOOD PRESSURE: 109 MMHG | DIASTOLIC BLOOD PRESSURE: 50 MMHG

## 2018-05-25 VITALS — SYSTOLIC BLOOD PRESSURE: 117 MMHG | DIASTOLIC BLOOD PRESSURE: 60 MMHG

## 2018-05-25 VITALS — SYSTOLIC BLOOD PRESSURE: 121 MMHG | DIASTOLIC BLOOD PRESSURE: 69 MMHG

## 2018-05-25 VITALS — DIASTOLIC BLOOD PRESSURE: 55 MMHG | SYSTOLIC BLOOD PRESSURE: 131 MMHG

## 2018-05-25 VITALS — DIASTOLIC BLOOD PRESSURE: 62 MMHG | SYSTOLIC BLOOD PRESSURE: 151 MMHG

## 2018-05-25 VITALS — SYSTOLIC BLOOD PRESSURE: 101 MMHG | DIASTOLIC BLOOD PRESSURE: 75 MMHG

## 2018-05-25 VITALS — DIASTOLIC BLOOD PRESSURE: 59 MMHG | SYSTOLIC BLOOD PRESSURE: 124 MMHG

## 2018-05-25 VITALS — DIASTOLIC BLOOD PRESSURE: 65 MMHG | SYSTOLIC BLOOD PRESSURE: 124 MMHG

## 2018-05-25 VITALS — SYSTOLIC BLOOD PRESSURE: 123 MMHG | DIASTOLIC BLOOD PRESSURE: 56 MMHG

## 2018-05-25 LAB
ALBUMIN SERPL-MCNC: 1.9 G/DL (ref 3.5–5)
ALBUMIN/GLOB SERPL: 0.7 {RATIO} (ref 0.8–2)
ALP SERPL-CCNC: 44 IU/L (ref 40–150)
ALT SERPL-CCNC: 9 IU/L (ref 0–55)
ANION GAP SERPL CALC-SCNC: 13.7 MMOL/L (ref 8–16)
ANISOCYTOSIS BLD QL SMEAR: (no result)
BASO STIPL BLD QL SMEAR: (no result)
BASOPHILS # BLD AUTO: 0 10*3/UL (ref 0–0.1)
BASOPHILS NFR BLD AUTO: 0.3 % (ref 0–1)
BUN SERPL-MCNC: 37 MG/DL (ref 7–26)
BUN/CREAT SERPL: 9 (ref 6–25)
CALCIUM SERPL-MCNC: 7.6 MG/DL (ref 8.4–10.2)
CHLORIDE SERPL-SCNC: 101 MMOL/L (ref 98–107)
CK MB SERPL-MCNC: 1.1 NG/ML (ref 0–5)
CK SERPL-CCNC: 19 IU/L (ref 29–168)
CO2 SERPL-SCNC: 31 MMOL/L (ref 22–29)
DEPRECATED NEUTROPHILS # BLD AUTO: 4.8 10*3/UL (ref 2.1–6.9)
EGFRCR SERPLBLD CKD-EPI 2021: 11 ML/MIN (ref 60–?)
EOSINOPHIL # BLD AUTO: 0.1 10*3/UL (ref 0–0.4)
EOSINOPHIL NFR BLD AUTO: 0.9 % (ref 0–6)
ERYTHROCYTE [DISTWIDTH] IN CORD BLOOD: 23.7 % (ref 11.7–14.4)
GLOBULIN PLAS-MCNC: 2.7 G/DL (ref 2.3–3.5)
GLUCOSE SERPLBLD-MCNC: 102 MG/DL (ref 74–118)
HCT VFR BLD AUTO: 26.4 % (ref 34.2–44.1)
HGB BLD-MCNC: 8.6 G/DL (ref 12–16)
HYPOCHROMIA BLD QL SMEAR: SLIGHT
LYMPHOCYTES # BLD: 0.7 10*3/UL (ref 1–3.2)
LYMPHOCYTES NFR BLD AUTO: 10.4 % (ref 18–39.1)
LYMPHOCYTES NFR BLD MANUAL: 9 % (ref 19–48)
MAGNESIUM SERPL-MCNC: 1.1 MG/DL (ref 1.3–2.1)
MCH RBC QN AUTO: 27 PG (ref 28–32)
MCHC RBC AUTO-ENTMCNC: 32.6 G/DL (ref 31–35)
MCV RBC AUTO: 82.8 FL (ref 81–99)
MONOCYTES # BLD AUTO: 0.7 10*3/UL (ref 0.2–0.8)
MONOCYTES NFR BLD AUTO: 11.1 % (ref 4.4–11.3)
MONOCYTES NFR BLD MANUAL: 8 % (ref 3.4–9)
NEUTS BAND NFR BLD MANUAL: 4 %
NEUTS SEG NFR BLD AUTO: 76.4 % (ref 38.7–80)
NEUTS SEG NFR BLD MANUAL: 75 % (ref 40–74)
PLAT MORPH BLD: NORMAL
PLATELET # BLD AUTO: 206 X10E3/UL (ref 140–360)
PLATELET # BLD EST: ADEQUATE 10*3/UL
POIKILOCYTOSIS BLD QL SMEAR: SLIGHT
POTASSIUM SERPL-SCNC: 4.7 MMOL/L (ref 3.5–5.1)
RBC # BLD AUTO: 3.19 X10E6/UL (ref 3.6–5.1)
RBC MORPH BLD: (no result)
SODIUM SERPL-SCNC: 141 MMOL/L (ref 136–145)

## 2018-05-25 PROCEDURE — 5A1D70Z PERFORMANCE OF URINARY FILTRATION, INTERMITTENT, LESS THAN 6 HOURS PER DAY: ICD-10-PCS

## 2018-05-25 RX ADMIN — LEVOTHYROXINE SODIUM SCH MCG: 50 TABLET ORAL at 13:01

## 2018-05-25 RX ADMIN — HYDROCODONE BITARTRATE AND ACETAMINOPHEN PRN EA: 5; 325 TABLET ORAL at 07:00

## 2018-05-25 RX ADMIN — MEGESTROL ACETATE SCH MG: 40 TABLET ORAL at 09:36

## 2018-05-25 RX ADMIN — LEVOTHYROXINE SODIUM SCH MCG: 50 TABLET ORAL at 09:36

## 2018-05-25 RX ADMIN — DEXTROSE AND SODIUM CHLORIDE SCH MLS/HR: 5; 450 INJECTION, SOLUTION INTRAVENOUS at 07:38

## 2018-05-25 RX ADMIN — METOPROLOL SUCCINATE SCH MG: 50 TABLET, EXTENDED RELEASE ORAL at 13:01

## 2018-05-25 RX ADMIN — METOPROLOL SUCCINATE SCH MG: 50 TABLET, EXTENDED RELEASE ORAL at 09:36

## 2018-05-25 RX ADMIN — DEXTROSE AND SODIUM CHLORIDE SCH MLS/HR: 5; 450 INJECTION, SOLUTION INTRAVENOUS at 17:15

## 2018-05-25 RX ADMIN — PANTOPRAZOLE SODIUM SCH MG: 40 TABLET, DELAYED RELEASE ORAL at 09:36

## 2018-05-25 RX ADMIN — VANCOMYCIN HYDROCHLORIDE SCH MG: 250 CAPSULE ORAL at 14:16

## 2018-05-25 RX ADMIN — DEXTROSE MONOHYDRATE SCH MLS/HR: 50 INJECTION, SOLUTION INTRAVENOUS at 05:30

## 2018-05-25 RX ADMIN — HYDROCODONE BITARTRATE AND ACETAMINOPHEN PRN EA: 5; 325 TABLET ORAL at 16:08

## 2018-05-25 RX ADMIN — HYDROCODONE BITARTRATE AND ACETAMINOPHEN PRN EA: 5; 325 TABLET ORAL at 11:02

## 2018-05-25 RX ADMIN — PANTOPRAZOLE SODIUM SCH MG: 40 TABLET, DELAYED RELEASE ORAL at 13:01

## 2018-05-25 RX ADMIN — MEGESTROL ACETATE SCH MG: 40 TABLET ORAL at 13:00

## 2018-05-25 RX ADMIN — VANCOMYCIN HYDROCHLORIDE SCH MG: 250 CAPSULE ORAL at 05:30

## 2018-05-25 RX ADMIN — PRAMIPEXOLE DIHYDROCHLORIDE SCH MG: 0.25 TABLET ORAL at 20:44

## 2018-05-25 RX ADMIN — BICTEGRAVIR SODIUM, EMTRICITABINE, AND TENOFOVIR ALAFENAMIDE FUMARATE SCH: 50; 200; 25 TABLET ORAL at 09:00

## 2018-05-25 RX ADMIN — VANCOMYCIN HYDROCHLORIDE SCH MG: 250 CAPSULE ORAL at 21:26

## 2018-05-25 NOTE — CONSULTATION
DATE OF CONSULTATION:  May 25, 2018 



CARDIOLOGY CONSULT



REASON FOR CONSULTATION:  Ongoing cardiac care.  



CONSULTING PHYSICIAN:  Dr. Cunningham.  



HISTORY OF PRESENT ILLNESS:  Ms. Atrhur is 83 years old, female 

.  She is a patient that is well known to our practice .  We are 

actually the ones who requested her to head to the ER due to acute renal 

failure on recent lab work done in the office with a noted potassium level 

of 7.8 at that time.  It has been reported from a recent visit in the 

office that the patient has had ongoing diarrhea and has been experiencing 

low blood pressure issues at home, fatigue and also an ongoing cough.  For 

these reasons, lab work had been ordered as outpatient; however,  acute 

renal failure was noted and for that reason the patient was instructed to 

go the ER.  



The patient was seen today at the bedside.  She denies any chest pain or 

shortness of breath.  She does endorse some pain in her lower extremities, 

right greater than left, and also swelling in her right lower extremity, 

greater than the left lower extremity and also some pain in her right arm 

and shoulder that relates to her use of walker frequently due to diarrhea 

back and forth to the bathroom.  She denies any chills, fever, dizziness or 

syncope.  



PAST MEDICAL HISTORY:  High blood pressure, high cholesterol, thyroid 

disease, blood clotting disorder and gout. 



PAST SURGICAL HISTORY:  None. 



FAMILY HISTORY:  Positive for high blood pressure. 



ALLERGIES:  SULFA.  



REVIEW OF SYSTEMS:  Negative except as mentioned in the history of present 

illness.  



PHYSICAL EXAMINATION:  

VITALS:  Temperature 98.7.  Pulse 91.  Respiratory rate 18.  Blood pressure 

120/59.  Oxygen saturation 100% on room air. 

GENERAL:  Alert and oriented x3.  Resting comfortably in bed.  Does not 

appear to be in any acute distress at this time.  

LUNGS:  Diminished breath sounds, posterior lower lobe, otherwise clear to 

auscultation.  No wheezing and no rhonchi or crackles.  

CARDIOVASCULAR:  Regular rate and rhythm. Tachycardiac.  

ABDOMEN:  Round, soft and nontender.  

EXTREMITIES:  Lower extremities with 2+ pitting edema, right lower 

extremity, left 1+ pitting edema.  

SKIN:  Scattered bruises throughout. 



TELEMETRY:   Sinus tachycardia.  



CARDIOVASCULAR MEDICATIONS:  Levothyroxine 50 mcg p.o. daily.  Metoprolol 

100 p.o. daily.  Pantoprazole 40 p.o. daily. 



LABORATORY DATA:  WBC 6.32, hemoglobin 8.6, hematocrit 26.4, platelets 

206,000.  Sodium 141, potassium 4.7, BUN 37, creatinine 3.97, GFR 11, 

calcium 7.6, magnesium 1.1, creatinine kinase 19 and CK-MB 1.10.  Troponin 

0.01.  



Chest x-ray from yesterday with improvement in the right upper lobe opacity 

which is consistent with pneumonia.



IMPRESSION:  

1. Right upper lobe pneumonia.

2. Thromboembolic disorder.  

3. Acute-on-chronic diastolic heart failure. 

4. Anemia secondary to renal failure.

5. Acute renal failure, status post dialysis yesterday.  



RECOMMENDATION:  Continue with the above-listed cardiac medications.  

Patient on contact isolation due to suspected C. diff.  Continue to 

monitor.  Obtain venous Doppler of the lower extremities, especially with 

asymmetric swelling.  We will review records from our office to see if any 

other tests are necessary at this time.  Continue volume management and 

nephrology care per Dr. Johnson.  



Thank you for this consultation.  Will continue to follow this patient 

closely. 



Dictated by:  Nahomi Villalta NP 



 



 





DD:  05/25/2018 16:51

DT:  05/25/2018 19:13

Job#:  [id number]

## 2018-05-26 VITALS — SYSTOLIC BLOOD PRESSURE: 152 MMHG | DIASTOLIC BLOOD PRESSURE: 67 MMHG

## 2018-05-26 VITALS — SYSTOLIC BLOOD PRESSURE: 120 MMHG | DIASTOLIC BLOOD PRESSURE: 60 MMHG

## 2018-05-26 VITALS — SYSTOLIC BLOOD PRESSURE: 158 MMHG | DIASTOLIC BLOOD PRESSURE: 67 MMHG

## 2018-05-26 VITALS — SYSTOLIC BLOOD PRESSURE: 151 MMHG | DIASTOLIC BLOOD PRESSURE: 65 MMHG

## 2018-05-26 VITALS — SYSTOLIC BLOOD PRESSURE: 142 MMHG | DIASTOLIC BLOOD PRESSURE: 60 MMHG

## 2018-05-26 VITALS — SYSTOLIC BLOOD PRESSURE: 159 MMHG | DIASTOLIC BLOOD PRESSURE: 76 MMHG

## 2018-05-26 VITALS — DIASTOLIC BLOOD PRESSURE: 69 MMHG | SYSTOLIC BLOOD PRESSURE: 149 MMHG

## 2018-05-26 LAB
ALBUMIN SERPL-MCNC: 2.1 G/DL (ref 3.5–5)
ALBUMIN/GLOB SERPL: 0.7 {RATIO} (ref 0.8–2)
ALP SERPL-CCNC: 50 IU/L (ref 40–150)
ALT SERPL-CCNC: 10 IU/L (ref 0–55)
ANION GAP SERPL CALC-SCNC: 12.3 MMOL/L (ref 8–16)
BUN SERPL-MCNC: 18 MG/DL (ref 7–26)
BUN/CREAT SERPL: 7 (ref 6–25)
CALCIUM SERPL-MCNC: 8.2 MG/DL (ref 8.4–10.2)
CHLORIDE SERPL-SCNC: 99 MMOL/L (ref 98–107)
CO2 SERPL-SCNC: 29 MMOL/L (ref 22–29)
DEPRECATED PHOSPHATE SERPL-MCNC: 2.4 MG/DL (ref 2.3–4.7)
EGFRCR SERPLBLD CKD-EPI 2021: 18 ML/MIN (ref 60–?)
GLOBULIN PLAS-MCNC: 3.1 G/DL (ref 2.3–3.5)
GLUCOSE SERPLBLD-MCNC: 87 MG/DL (ref 74–118)
MAGNESIUM SERPL-MCNC: 1.2 MG/DL (ref 1.3–2.1)
POTASSIUM SERPL-SCNC: 4.3 MMOL/L (ref 3.5–5.1)
SODIUM SERPL-SCNC: 136 MMOL/L (ref 136–145)

## 2018-05-26 RX ADMIN — METOPROLOL SUCCINATE SCH MG: 50 TABLET, EXTENDED RELEASE ORAL at 09:17

## 2018-05-26 RX ADMIN — VANCOMYCIN HYDROCHLORIDE SCH MG: 250 CAPSULE ORAL at 13:52

## 2018-05-26 RX ADMIN — VANCOMYCIN HYDROCHLORIDE SCH MG: 250 CAPSULE ORAL at 05:04

## 2018-05-26 RX ADMIN — MEGESTROL ACETATE SCH MG: 40 TABLET ORAL at 09:17

## 2018-05-26 RX ADMIN — HYDROCODONE BITARTRATE AND ACETAMINOPHEN PRN EA: 5; 325 TABLET ORAL at 21:20

## 2018-05-26 RX ADMIN — DEXTROSE AND SODIUM CHLORIDE SCH MLS/HR: 5; 450 INJECTION, SOLUTION INTRAVENOUS at 13:27

## 2018-05-26 RX ADMIN — VANCOMYCIN HYDROCHLORIDE SCH MG: 250 CAPSULE ORAL at 21:19

## 2018-05-26 RX ADMIN — LEVOTHYROXINE SODIUM SCH MCG: 50 TABLET ORAL at 05:04

## 2018-05-26 RX ADMIN — PRAMIPEXOLE DIHYDROCHLORIDE SCH MG: 0.25 TABLET ORAL at 21:00

## 2018-05-26 RX ADMIN — HYDROCODONE BITARTRATE AND ACETAMINOPHEN PRN EA: 5; 325 TABLET ORAL at 06:30

## 2018-05-26 RX ADMIN — BICTEGRAVIR SODIUM, EMTRICITABINE, AND TENOFOVIR ALAFENAMIDE FUMARATE SCH: 50; 200; 25 TABLET ORAL at 09:00

## 2018-05-26 RX ADMIN — PANTOPRAZOLE SODIUM SCH MG: 40 TABLET, DELAYED RELEASE ORAL at 09:17

## 2018-05-26 RX ADMIN — DEXTROSE AND SODIUM CHLORIDE SCH MLS/HR: 5; 450 INJECTION, SOLUTION INTRAVENOUS at 03:15

## 2018-05-26 NOTE — PROGRESS NOTE
DATE:  May 26, 2018 



CARDIOLOGY PROGRESS NOTE



SUBJECTIVE:  Patient complains of right upper arm and lower arm pain that 

is worsening and swelling that is worsening.   Denies any chest pain or 

shortness of breath.



OBJECTIVE   

VITAL SIGNS:  Temperature 97.6, pulse 94, respiratory rate 19.  Blood 

pressure 159/76.  Oxygen saturation 92% on room air.  



CARDIOVASCULAR MEDICATIONS 

1. Metoprolol succinate 100 mg p.o. daily.  

2. Megace 20 mg p.o. daily.



LABS:  Sodium 136, potassium 4.3, BUN 18, creatinine 2.52.  GFR 18.  

Magnesium 1.2.  AST 10, ALT 11.



TELEMETRY:  Sinus rhythm.



PHYSICAL EXAMINATION 

GENERAL:  Alert and oriented times 3.  Resting comfortably in bed.  Family 

at the bedside.  Does not appear to be in any acute distress.

LUNGS:  Clear to auscultation throughout.  No wheezing.  No rhonchi or 

crackles. 

CARDIOVASCULAR:  Regular rate and rhythm.  No murmurs or gallops noted. 

ABDOMEN:  Rounded, soft, nontender.  

EXTREMITIES:  Lower extremities have 2+ pitting edema, right greater than 

left.  Upper extremities have 2+ nonpitting edema to the right upper 

extremity with redness and pain upon touch.

SKIN:  Scattered bruises throughout. 



IMPRESSION 

1. Right upper lobe pneumonia.  

2. Thromboembolic disorder.

3. Acute-on-chronic diastolic heart failure.  

4. Anemia.

5. Acute renal failure, status post dialysis with improvement noted.



RECOMMENDATION:  Volume management per nephrology.  Continue the 

above-listed cardiac medications.  Obtain upper extremity Doppler to rule 

out DVT at this time.  Arm elevation and pain management advised.  Will 

continue to follow carefully.  



Dictated by Nahomi Villalta NP. 







DD:  05/26/2018 11:38

DT:  05/26/2018 12:47

Job#:  [id number]

## 2018-05-27 VITALS — SYSTOLIC BLOOD PRESSURE: 168 MMHG | DIASTOLIC BLOOD PRESSURE: 76 MMHG

## 2018-05-27 VITALS — SYSTOLIC BLOOD PRESSURE: 169 MMHG | DIASTOLIC BLOOD PRESSURE: 102 MMHG

## 2018-05-27 VITALS — DIASTOLIC BLOOD PRESSURE: 76 MMHG | SYSTOLIC BLOOD PRESSURE: 168 MMHG

## 2018-05-27 VITALS — SYSTOLIC BLOOD PRESSURE: 148 MMHG | DIASTOLIC BLOOD PRESSURE: 73 MMHG

## 2018-05-27 VITALS — DIASTOLIC BLOOD PRESSURE: 68 MMHG | SYSTOLIC BLOOD PRESSURE: 150 MMHG

## 2018-05-27 VITALS — DIASTOLIC BLOOD PRESSURE: 63 MMHG | SYSTOLIC BLOOD PRESSURE: 142 MMHG

## 2018-05-27 VITALS — DIASTOLIC BLOOD PRESSURE: 76 MMHG | SYSTOLIC BLOOD PRESSURE: 164 MMHG

## 2018-05-27 LAB
ANION GAP SERPL CALC-SCNC: 10.4 MMOL/L (ref 8–16)
ANISOCYTOSIS BLD QL SMEAR: SLIGHT
BASOPHILS # BLD AUTO: 0 10*3/UL (ref 0–0.1)
BASOPHILS NFR BLD AUTO: 0.3 % (ref 0–1)
BUN SERPL-MCNC: 24 MG/DL (ref 7–26)
BUN/CREAT SERPL: 9 (ref 6–25)
CALCIUM SERPL-MCNC: 8.2 MG/DL (ref 8.4–10.2)
CHLORIDE SERPL-SCNC: 97 MMOL/L (ref 98–107)
CO2 SERPL-SCNC: 27 MMOL/L (ref 22–29)
DEPRECATED NEUTROPHILS # BLD AUTO: 6.2 10*3/UL (ref 2.1–6.9)
DEPRECATED PHOSPHATE SERPL-MCNC: 2.8 MG/DL (ref 2.3–4.7)
EGFRCR SERPLBLD CKD-EPI 2021: 17 ML/MIN (ref 60–?)
EOSINOPHIL # BLD AUTO: 0.1 10*3/UL (ref 0–0.4)
EOSINOPHIL NFR BLD AUTO: 1.3 % (ref 0–6)
ERYTHROCYTE [DISTWIDTH] IN CORD BLOOD: 23.2 % (ref 11.7–14.4)
GLUCOSE SERPLBLD-MCNC: 87 MG/DL (ref 74–118)
HCT VFR BLD AUTO: 31.8 % (ref 34.2–44.1)
HGB BLD-MCNC: 10.1 G/DL (ref 12–16)
LYMPHOCYTES # BLD: 1.3 10*3/UL (ref 1–3.2)
LYMPHOCYTES NFR BLD AUTO: 15.2 % (ref 18–39.1)
MAGNESIUM SERPL-MCNC: 1.1 MG/DL (ref 1.3–2.1)
MCH RBC QN AUTO: 27.1 PG (ref 28–32)
MCHC RBC AUTO-ENTMCNC: 31.8 G/DL (ref 31–35)
MCV RBC AUTO: 85.3 FL (ref 81–99)
MONOCYTES # BLD AUTO: 1 10*3/UL (ref 0.2–0.8)
MONOCYTES NFR BLD AUTO: 11.6 % (ref 4.4–11.3)
NEUTS SEG NFR BLD AUTO: 70.8 % (ref 38.7–80)
PLAT MORPH BLD: NORMAL
PLATELET # BLD AUTO: 224 X10E3/UL (ref 140–360)
PLATELET # BLD EST: ADEQUATE 10*3/UL
POTASSIUM SERPL-SCNC: 4.4 MMOL/L (ref 3.5–5.1)
RBC # BLD AUTO: 3.73 X10E6/UL (ref 3.6–5.1)
RBC MORPH BLD: NORMAL
SODIUM SERPL-SCNC: 130 MMOL/L (ref 136–145)

## 2018-05-27 RX ADMIN — HYDROCODONE BITARTRATE AND ACETAMINOPHEN PRN EA: 5; 325 TABLET ORAL at 05:24

## 2018-05-27 RX ADMIN — VANCOMYCIN HYDROCHLORIDE SCH MG: 250 CAPSULE ORAL at 14:00

## 2018-05-27 RX ADMIN — VANCOMYCIN HYDROCHLORIDE SCH MG: 250 CAPSULE ORAL at 21:39

## 2018-05-27 RX ADMIN — DEXTROSE AND SODIUM CHLORIDE SCH MLS/HR: 5; 450 INJECTION, SOLUTION INTRAVENOUS at 02:00

## 2018-05-27 RX ADMIN — METOPROLOL SUCCINATE SCH MG: 50 TABLET, EXTENDED RELEASE ORAL at 08:00

## 2018-05-27 RX ADMIN — PRAMIPEXOLE DIHYDROCHLORIDE SCH MG: 0.25 TABLET ORAL at 21:39

## 2018-05-27 RX ADMIN — VANCOMYCIN HYDROCHLORIDE SCH MG: 250 CAPSULE ORAL at 05:24

## 2018-05-27 RX ADMIN — HYDROCODONE BITARTRATE AND ACETAMINOPHEN PRN EA: 5; 325 TABLET ORAL at 22:19

## 2018-05-27 RX ADMIN — LEVOTHYROXINE SODIUM SCH MCG: 50 TABLET ORAL at 05:24

## 2018-05-27 RX ADMIN — BICTEGRAVIR SODIUM, EMTRICITABINE, AND TENOFOVIR ALAFENAMIDE FUMARATE SCH: 50; 200; 25 TABLET ORAL at 09:00

## 2018-05-27 RX ADMIN — LIDOCAINE SCH EA: 50 PATCH CUTANEOUS at 12:42

## 2018-05-27 RX ADMIN — DEXTROSE AND SODIUM CHLORIDE SCH MLS/HR: 5; 450 INJECTION, SOLUTION INTRAVENOUS at 16:18

## 2018-05-27 RX ADMIN — MEGESTROL ACETATE SCH MG: 40 TABLET ORAL at 08:00

## 2018-05-27 RX ADMIN — PANTOPRAZOLE SODIUM SCH MG: 40 TABLET, DELAYED RELEASE ORAL at 08:00

## 2018-05-27 NOTE — PROGRESS NOTE
DATE:    



CARDIOLOGY PROGRESS NOTE



SUBJECTIVE:  Patient complains of right upper extremity pain and swelling.  

Denies any chest pain or shortness of breath.  She states overall she feels 

a little bit better.



CARDIOVASCULAR MEDICATIONS

1. Metoprolol 100 mg p.o. daily.

2. Levothyroxine 50 mcg p.o. daily.



OBJECTIVE

VITAL SIGNS:  Temperature 97.1, pulse 82, respiratory rate 18, blood 

pressure 168/78, oxygen saturation 95% on room air.

GENERAL:  Alert and oriented x3, resting comfortably in bed, does not 

appear to be in acute distress.

LUNGS:  Clear to auscultation throughout.  No wheezing.  No rhonchi or 

crackles.

CARDIOVASCULAR:  Regular in rate and rhythm.  No murmur, no gallops.

ABDOMEN:  Soft, nontender. 

EXTREMITIES:  Lower extremities, 2+ pitting edema, right greater than left. 

 Upper extremities, nonpitting edema right upper extremity with fluid 

patches noted.  Scant bruises.



LABS:  WBC 8.71, hemoglobin 10.1, hematocrit 31.8, platelets 324.  Sodium 

130, potassium 4.4, creatinine 2.68, GFR 17, calcium 8.2, magnesium 1.1.



TELEMETRY:  Sinus rhythm.



IMPRESSION

1. Right upper lobe pneumonia.

2. Thromboembolic disorder.

3. Acute-on-chronic diastolic heart failure.

4. Anemia.

5. Acute renal failure, status post dialysis with improvement.



RECOMMENDATIONS:  Continue the above-listed cardiac medications.  Volume 

management per nephrologist.  Monitor electrolytes closely and replace.  

Magnesium replaced today.  Continue right arm elevation and pain management 

with lidocaine patch.  We will continue to follow very closely.





Dictated by:  Nahomi Villalta NP









DD:  05/27/2018 13:20

DT:  05/27/2018 14:12

Job#:  [id number] [facility]

## 2018-05-28 VITALS — DIASTOLIC BLOOD PRESSURE: 74 MMHG | SYSTOLIC BLOOD PRESSURE: 171 MMHG

## 2018-05-28 VITALS — SYSTOLIC BLOOD PRESSURE: 166 MMHG | DIASTOLIC BLOOD PRESSURE: 71 MMHG

## 2018-05-28 VITALS — DIASTOLIC BLOOD PRESSURE: 70 MMHG | SYSTOLIC BLOOD PRESSURE: 155 MMHG

## 2018-05-28 VITALS — SYSTOLIC BLOOD PRESSURE: 139 MMHG | DIASTOLIC BLOOD PRESSURE: 62 MMHG

## 2018-05-28 VITALS — SYSTOLIC BLOOD PRESSURE: 159 MMHG | DIASTOLIC BLOOD PRESSURE: 70 MMHG

## 2018-05-28 VITALS — SYSTOLIC BLOOD PRESSURE: 155 MMHG | DIASTOLIC BLOOD PRESSURE: 70 MMHG

## 2018-05-28 VITALS — DIASTOLIC BLOOD PRESSURE: 73 MMHG | SYSTOLIC BLOOD PRESSURE: 162 MMHG

## 2018-05-28 VITALS — SYSTOLIC BLOOD PRESSURE: 162 MMHG | DIASTOLIC BLOOD PRESSURE: 73 MMHG

## 2018-05-28 VITALS — DIASTOLIC BLOOD PRESSURE: 65 MMHG | SYSTOLIC BLOOD PRESSURE: 139 MMHG

## 2018-05-28 LAB
ANION GAP SERPL CALC-SCNC: 10.5 MMOL/L (ref 8–16)
ANISOCYTOSIS BLD QL SMEAR: (no result)
BASOPHILS # BLD AUTO: 0 10*3/UL (ref 0–0.1)
BASOPHILS NFR BLD AUTO: 0.4 % (ref 0–1)
BUN SERPL-MCNC: 27 MG/DL (ref 7–26)
BUN/CREAT SERPL: 10 (ref 6–25)
CALCIUM SERPL-MCNC: 8.4 MG/DL (ref 8.4–10.2)
CHLORIDE SERPL-SCNC: 99 MMOL/L (ref 98–107)
CO2 SERPL-SCNC: 27 MMOL/L (ref 22–29)
DEPRECATED NEUTROPHILS # BLD AUTO: 5.7 10*3/UL (ref 2.1–6.9)
EGFRCR SERPLBLD CKD-EPI 2021: 16 ML/MIN (ref 60–?)
EOSINOPHIL # BLD AUTO: 0.2 10*3/UL (ref 0–0.4)
EOSINOPHIL NFR BLD AUTO: 1.8 % (ref 0–6)
ERYTHROCYTE [DISTWIDTH] IN CORD BLOOD: 22.5 % (ref 11.7–14.4)
FERRITIN SERPL-MCNC: 857.18 NG/ML (ref 4.63–204)
FOLATE SERPL-MCNC: 10.9 NG/ML (ref 7–15.4)
GLUCOSE SERPLBLD-MCNC: 80 MG/DL (ref 74–118)
HCT VFR BLD AUTO: 30.7 % (ref 34.2–44.1)
HGB BLD-MCNC: 9.8 G/DL (ref 12–16)
HYPOCHROMIA BLD QL SMEAR: SLIGHT
IRON SATN MFR SERPL: 20 % (ref 15–50)
IRON SERPL-MCNC: 32 UG/DL (ref 50–170)
LYMPHOCYTES # BLD: 1.4 10*3/UL (ref 1–3.2)
LYMPHOCYTES NFR BLD AUTO: 16.6 % (ref 18–39.1)
MCH RBC QN AUTO: 27 PG (ref 28–32)
MCHC RBC AUTO-ENTMCNC: 31.9 G/DL (ref 31–35)
MCV RBC AUTO: 84.6 FL (ref 81–99)
MONOCYTES # BLD AUTO: 0.8 10*3/UL (ref 0.2–0.8)
MONOCYTES NFR BLD AUTO: 9.6 % (ref 4.4–11.3)
NEUTS SEG NFR BLD AUTO: 70.6 % (ref 38.7–80)
PLATELET # BLD AUTO: 222 X10E3/UL (ref 140–360)
PLATELET # BLD EST: ADEQUATE 10*3/UL
PLATELET CLUMP BLD QL SMEAR: (no result)
POIKILOCYTOSIS BLD QL SMEAR: SLIGHT
POTASSIUM SERPL-SCNC: 4.5 MMOL/L (ref 3.5–5.1)
RBC # BLD AUTO: 3.63 X10E6/UL (ref 3.6–5.1)
RBC MORPH BLD: (no result)
SODIUM SERPL-SCNC: 132 MMOL/L (ref 136–145)
T4 FREE SERPL-MCNC: 0.91 NG/DL (ref 0.9–1.8)
TIBC SERPL-MCNC: 164 UG/DL (ref 261–478)
TRANSFERRIN SERPL-MCNC: 117 MG/DL (ref 180–382)
TSH SERPL DL<=0.005 MIU/L-ACNC: 3.78 UIU/ML (ref 0.35–4.94)
VIT B12 BLD-MCNC: 360 PG/ML (ref 213–816)

## 2018-05-28 RX ADMIN — VANCOMYCIN HYDROCHLORIDE SCH MG: 250 CAPSULE ORAL at 05:34

## 2018-05-28 RX ADMIN — DEXTROSE AND SODIUM CHLORIDE SCH MLS/HR: 5; 450 INJECTION, SOLUTION INTRAVENOUS at 20:54

## 2018-05-28 RX ADMIN — DEXTROSE AND SODIUM CHLORIDE SCH MLS/HR: 5; 450 INJECTION, SOLUTION INTRAVENOUS at 06:36

## 2018-05-28 RX ADMIN — SODIUM CHLORIDE SCH MLS/HR: 9 INJECTION, SOLUTION INTRAVENOUS at 10:41

## 2018-05-28 RX ADMIN — VANCOMYCIN HYDROCHLORIDE SCH MG: 250 CAPSULE ORAL at 21:18

## 2018-05-28 RX ADMIN — LEVOTHYROXINE SODIUM SCH MCG: 50 TABLET ORAL at 05:34

## 2018-05-28 RX ADMIN — PANTOPRAZOLE SODIUM SCH MG: 40 TABLET, DELAYED RELEASE ORAL at 08:46

## 2018-05-28 RX ADMIN — MEGESTROL ACETATE SCH MG: 40 TABLET ORAL at 08:46

## 2018-05-28 RX ADMIN — HYDROCODONE BITARTRATE AND ACETAMINOPHEN PRN EA: 5; 325 TABLET ORAL at 05:34

## 2018-05-28 RX ADMIN — LIDOCAINE SCH EA: 50 PATCH CUTANEOUS at 08:47

## 2018-05-28 RX ADMIN — HYDROCODONE BITARTRATE AND ACETAMINOPHEN PRN EA: 5; 325 TABLET ORAL at 21:18

## 2018-05-28 RX ADMIN — METOPROLOL SUCCINATE SCH MG: 50 TABLET, EXTENDED RELEASE ORAL at 08:47

## 2018-05-28 RX ADMIN — VANCOMYCIN HYDROCHLORIDE SCH MG: 250 CAPSULE ORAL at 14:02

## 2018-05-28 RX ADMIN — PRAMIPEXOLE DIHYDROCHLORIDE SCH MG: 0.25 TABLET ORAL at 21:18

## 2018-05-28 RX ADMIN — BICTEGRAVIR SODIUM, EMTRICITABINE, AND TENOFOVIR ALAFENAMIDE FUMARATE SCH: 50; 200; 25 TABLET ORAL at 08:48

## 2018-05-28 NOTE — PROGRESS NOTE
DATE:    



CARDIOLOGY PROGRESS NOTE



SUBJECTIVE:  She reports feeling very well.  She has not any complaints 

this morning.  Denies any chest pain, shortness of breath, or palpitations. 

 She reports that her right upper extremity pain and swelling is improving.



CARDIOVASCULAR MEDICATIONS

1. Metoprolol 100 mg p.o. daily.

2. Levothyroxine 50 mcg p.o. daily.



OBJECTIVE

VITAL SIGNS:  Temperature 96.2, pulse 82, respiratory rate 20, blood 

pressure 155/70, oxygen saturation 95% on room air.

GENERAL:  Alert and oriented x3, resting comfortably in bed, does not 

appear to be in any acute distress.

LUNGS:  Clear to auscultation throughout.  No wheezing.  No rhonchi or 

crackles.

CARDIOVASCULAR:  Regular rate and rhythm.  Normal S1, S2.  S3 and S4 noted.

ABDOMEN:  Soft, nontender.

EXTREMITIES:  Lower extremities, trace edema bilaterally with improvement 

noted.  Right upper extremity, 1+ nonpitting edema.



LABS:  WBC 8.12, hemoglobin 9.8, hematocrit 30.7, platelets 222.  Sodium 

132, potassium 4.5, BUN 27, creatinine 2.75, GFR 16, calcium 8.4.



TELEMETRY:  Sinus rhythm.



IMPRESSION

1. Right upper lobe pneumonia.

2. Thromboembolic disorder.

3. Acute-on-chronic diastolic heart failure.

4. Anemia.

5. Acute renal failure, currently on hemodialysis.



RECOMMENDATIONS:  Continue the above-listed cardiac medications.  Volume 

management per nephrologist.  Monitor electrolyte and replace accordingly.  

Continue right upper extremity elevation and pain management.  We will 

continue to follow closely.





Dictated By:  Nahomi Villalta NP









DD:  05/28/2018 11:05

DT:  05/28/2018 12:08

Job#:  [id number] [facility]

## 2018-05-29 VITALS — DIASTOLIC BLOOD PRESSURE: 73 MMHG | SYSTOLIC BLOOD PRESSURE: 172 MMHG

## 2018-05-29 VITALS — DIASTOLIC BLOOD PRESSURE: 70 MMHG | SYSTOLIC BLOOD PRESSURE: 167 MMHG

## 2018-05-29 VITALS — DIASTOLIC BLOOD PRESSURE: 77 MMHG | SYSTOLIC BLOOD PRESSURE: 140 MMHG

## 2018-05-29 VITALS — SYSTOLIC BLOOD PRESSURE: 180 MMHG | DIASTOLIC BLOOD PRESSURE: 75 MMHG

## 2018-05-29 VITALS — SYSTOLIC BLOOD PRESSURE: 172 MMHG | DIASTOLIC BLOOD PRESSURE: 73 MMHG

## 2018-05-29 VITALS — SYSTOLIC BLOOD PRESSURE: 155 MMHG | DIASTOLIC BLOOD PRESSURE: 70 MMHG

## 2018-05-29 LAB
ALBUMIN SERPL-MCNC: 2 G/DL (ref 3.5–5)
ALBUMIN/GLOB SERPL: 0.8 {RATIO} (ref 0.8–2)
ALP SERPL-CCNC: 52 IU/L (ref 40–150)
ALT SERPL-CCNC: 9 IU/L (ref 0–55)
ANION GAP SERPL CALC-SCNC: 12.7 MMOL/L (ref 8–16)
BASOPHILS # BLD AUTO: 0 10*3/UL (ref 0–0.1)
BASOPHILS NFR BLD AUTO: 0.2 % (ref 0–1)
BUN SERPL-MCNC: 32 MG/DL (ref 7–26)
BUN/CREAT SERPL: 11 (ref 6–25)
CALCIUM SERPL-MCNC: 8.5 MG/DL (ref 8.4–10.2)
CHLORIDE SERPL-SCNC: 100 MMOL/L (ref 98–107)
CO2 SERPL-SCNC: 26 MMOL/L (ref 22–29)
DEPRECATED NEUTROPHILS # BLD AUTO: 5.9 10*3/UL (ref 2.1–6.9)
EGFRCR SERPLBLD CKD-EPI 2021: 15 ML/MIN (ref 60–?)
EOSINOPHIL # BLD AUTO: 0.2 10*3/UL (ref 0–0.4)
EOSINOPHIL NFR BLD AUTO: 1.8 % (ref 0–6)
ERYTHROCYTE [DISTWIDTH] IN CORD BLOOD: 22.6 % (ref 11.7–14.4)
GLOBULIN PLAS-MCNC: 2.6 G/DL (ref 2.3–3.5)
GLUCOSE SERPLBLD-MCNC: 70 MG/DL (ref 74–118)
HCT VFR BLD AUTO: 29.1 % (ref 34.2–44.1)
HGB BLD-MCNC: 9.5 G/DL (ref 12–16)
LYMPHOCYTES # BLD: 1.4 10*3/UL (ref 1–3.2)
LYMPHOCYTES NFR BLD AUTO: 16.7 % (ref 18–39.1)
MAGNESIUM SERPL-MCNC: 1.3 MG/DL (ref 1.3–2.1)
MCH RBC QN AUTO: 27.7 PG (ref 28–32)
MCHC RBC AUTO-ENTMCNC: 32.6 G/DL (ref 31–35)
MCV RBC AUTO: 84.8 FL (ref 81–99)
MONOCYTES # BLD AUTO: 0.7 10*3/UL (ref 0.2–0.8)
MONOCYTES NFR BLD AUTO: 8.9 % (ref 4.4–11.3)
NEUTS SEG NFR BLD AUTO: 71.4 % (ref 38.7–80)
PLATELET # BLD AUTO: 215 X10E3/UL (ref 140–360)
POTASSIUM SERPL-SCNC: 4.7 MMOL/L (ref 3.5–5.1)
RBC # BLD AUTO: 3.43 X10E6/UL (ref 3.6–5.1)
SODIUM SERPL-SCNC: 134 MMOL/L (ref 136–145)

## 2018-05-29 RX ADMIN — HYDROCODONE BITARTRATE AND ACETAMINOPHEN PRN EA: 5; 325 TABLET ORAL at 21:06

## 2018-05-29 RX ADMIN — PANTOPRAZOLE SODIUM SCH MG: 40 TABLET, DELAYED RELEASE ORAL at 08:07

## 2018-05-29 RX ADMIN — DEXTROSE AND SODIUM CHLORIDE SCH MLS/HR: 5; 450 INJECTION, SOLUTION INTRAVENOUS at 10:07

## 2018-05-29 RX ADMIN — LEVOTHYROXINE SODIUM SCH MCG: 50 TABLET ORAL at 05:14

## 2018-05-29 RX ADMIN — DEXTROSE AND SODIUM CHLORIDE SCH MLS/HR: 5; 450 INJECTION, SOLUTION INTRAVENOUS at 13:39

## 2018-05-29 RX ADMIN — VANCOMYCIN HYDROCHLORIDE SCH MG: 250 CAPSULE ORAL at 13:41

## 2018-05-29 RX ADMIN — VANCOMYCIN HYDROCHLORIDE SCH MG: 250 CAPSULE ORAL at 21:07

## 2018-05-29 RX ADMIN — HYDROCODONE BITARTRATE AND ACETAMINOPHEN PRN EA: 5; 325 TABLET ORAL at 11:40

## 2018-05-29 RX ADMIN — MEGESTROL ACETATE SCH MG: 40 TABLET ORAL at 08:08

## 2018-05-29 RX ADMIN — VANCOMYCIN HYDROCHLORIDE SCH MG: 250 CAPSULE ORAL at 05:14

## 2018-05-29 RX ADMIN — LIDOCAINE SCH EA: 50 PATCH CUTANEOUS at 08:08

## 2018-05-29 RX ADMIN — SODIUM CHLORIDE SCH MLS/HR: 9 INJECTION, SOLUTION INTRAVENOUS at 10:07

## 2018-05-29 RX ADMIN — PRAMIPEXOLE DIHYDROCHLORIDE SCH MG: 0.25 TABLET ORAL at 21:07

## 2018-05-29 RX ADMIN — METOPROLOL SUCCINATE SCH MG: 50 TABLET, EXTENDED RELEASE ORAL at 08:08

## 2018-05-29 RX ADMIN — BICTEGRAVIR SODIUM, EMTRICITABINE, AND TENOFOVIR ALAFENAMIDE FUMARATE SCH: 50; 200; 25 TABLET ORAL at 08:16

## 2018-05-29 NOTE — PROGRESS NOTE
DATE:  May 29, 2018 



CARDIOLOGY PROGRESS NOTE



SUBJECTIVE:  Patient is without any complaints.  She states she feels a lot 

better. 



OBJECTIVE  

VITAL SIGNS:  Temperature 97.7, pulse 85, respiratory rate 20.  Blood 

pressure 172/73.  Oxygen saturation 93% on room air.  



CARDIOVASCULAR MEDICATIONS:  Metoprolol 100 mg p.o. daily.  



LABS:  WBC 8.21, hemoglobin 9.5, hematocrit 29.7, platelets 215.  Sodium 

134, potassium 4.7, BUN 32, creatinine 3.03, GFR 15, magnesium 1.3.  C. 

diff positive.  



TELEMETRY:  Sinus rhythm.



PHYSICAL EXAMINATION 

GENERAL:  Alert and oriented times 3, resting comfortably in bed, does not 

appear to be in any acute distress. 

NECK:  Supple.  No JVD noted.  

CARDIOVASCULAR:  Regular rate and rhythm.  Normal S1, S2.  S3 and S4 noted.

LUNGS:  Clear to auscultation throughout.  No wheezing.  No rhonchi or 

crackles. 

ABDOMEN:  Soft, nontender.  Normoactive bowel sounds.  

LOWER EXTREMITIES:  Trace edema bilaterally.  Edema noted to the right 

upper extremity with improvement. 



IMPRESSION 

1. Right upper lobe pneumonia.

2. Thromboembolic disorder.

3. Acute-on-chronic diastolic heart failure.

4. Anemia.

5. Acute renal failure, currently on hemodialysis.



RECOMMENDATIONS:  Continue with the above-listed cardiac medications.  

Volume management per nephrology.  The patient received iron infusion 

yesterday with improvement of symptoms and energy per her reporting.  

Monitor electrolytes and replace accordingly.  Continue right upper 

extremity elevation and pain management.  Will continue to follow this 

patient closely.  Maintain on telemetry. 









DD:  05/29/2018 09:21

DT:  05/29/2018 10:07

Job#:  [id number]

## 2018-05-29 NOTE — DIAGNOSTIC IMAGING REPORT
PROCEDURE:

Frontal and lateral views of the chest.

 

COMPARISON: Patients [facility], DX, CHEST XRAY POST PROCEDURE, 

5/24/2018, 19:45.  Patients [facility], DX, CHEST SINGLE 

(PORTABLE), 5/24/2018, 11:59.

 

INDICATIONS:   CHF

     

FINDINGS:

Lines/tubes: Interval removal of previously visualized right sided 

central line.

 

Lungs: Hyperinflated lungs. Continued improvement in right upper lobe 

airspace opacity, with only minimal opacities to left. Stable 

prominence of bilateral interstitial markings.

 

Pleura: Interval development of small left pleural effusion and likely 

associated atelectasis.

 

Heart and mediastinum: Cardiac silhouette is unremarkable. Mild central 

venous congestion, which is improved from previous exam

Bones:  No acute bony abnormality.

 

IMPRESSION: 

 

1. hyperinflated lungs, consistent with COPD. Continued improvement in 

right upper lobe airspace opacity.

2. Interval development of small left pleural effusion and likely 

associated left lower lobe atelectasis.

3. Improved mild central pulmonary venous congestion. 

 

 

Emerson Davis M.D.  

Dictated by:  Emerson Davis M.D. on 5/29/2018 at 16:24     

Electronically approved by:  Emerson Davis M.D. on 5/29/2018 at 

16:24

## 2018-05-30 VITALS — SYSTOLIC BLOOD PRESSURE: 172 MMHG | DIASTOLIC BLOOD PRESSURE: 81 MMHG

## 2018-05-30 VITALS — SYSTOLIC BLOOD PRESSURE: 130 MMHG | DIASTOLIC BLOOD PRESSURE: 70 MMHG

## 2018-05-30 VITALS — SYSTOLIC BLOOD PRESSURE: 176 MMHG | DIASTOLIC BLOOD PRESSURE: 77 MMHG

## 2018-05-30 VITALS — DIASTOLIC BLOOD PRESSURE: 77 MMHG | SYSTOLIC BLOOD PRESSURE: 169 MMHG

## 2018-05-30 VITALS — SYSTOLIC BLOOD PRESSURE: 169 MMHG | DIASTOLIC BLOOD PRESSURE: 77 MMHG

## 2018-05-30 VITALS — SYSTOLIC BLOOD PRESSURE: 138 MMHG | DIASTOLIC BLOOD PRESSURE: 63 MMHG

## 2018-05-30 VITALS — DIASTOLIC BLOOD PRESSURE: 69 MMHG | SYSTOLIC BLOOD PRESSURE: 143 MMHG

## 2018-05-30 LAB
ALBUMIN SERPL-MCNC: 2.2 G/DL (ref 3.5–5)
ALBUMIN/GLOB SERPL: 0.8 {RATIO} (ref 0.8–2)
ALP SERPL-CCNC: 59 IU/L (ref 40–150)
ALT SERPL-CCNC: 9 IU/L (ref 0–55)
ANION GAP SERPL CALC-SCNC: 12.4 MMOL/L (ref 8–16)
ANISOCYTOSIS BLD QL SMEAR: (no result)
BASOPHILS # BLD AUTO: 0 10*3/UL (ref 0–0.1)
BASOPHILS NFR BLD AUTO: 0.2 % (ref 0–1)
BUN SERPL-MCNC: 31 MG/DL (ref 7–26)
BUN/CREAT SERPL: 11 (ref 6–25)
CALCIUM SERPL-MCNC: 8.5 MG/DL (ref 8.4–10.2)
CHLORIDE SERPL-SCNC: 98 MMOL/L (ref 98–107)
CO2 SERPL-SCNC: 25 MMOL/L (ref 22–29)
DEPRECATED NEUTROPHILS # BLD AUTO: 16 10*3/UL (ref 2.1–6.9)
EGFRCR SERPLBLD CKD-EPI 2021: 16 ML/MIN (ref 60–?)
ELLIPTOCYTES BLD QL SMEAR: SLIGHT
EOSINOPHIL # BLD AUTO: 0.1 10*3/UL (ref 0–0.4)
EOSINOPHIL NFR BLD AUTO: 0.5 % (ref 0–6)
ERYTHROCYTE [DISTWIDTH] IN CORD BLOOD: 22.5 % (ref 11.7–14.4)
GLOBULIN PLAS-MCNC: 2.7 G/DL (ref 2.3–3.5)
GLUCOSE SERPLBLD-MCNC: 92 MG/DL (ref 74–118)
HCT VFR BLD AUTO: 29.4 % (ref 34.2–44.1)
HGB BLD-MCNC: 9.4 G/DL (ref 12–16)
HYPOCHROMIA BLD QL SMEAR: SLIGHT
LYMPHOCYTES # BLD: 0.6 10*3/UL (ref 1–3.2)
LYMPHOCYTES NFR BLD AUTO: 3.5 % (ref 18–39.1)
MCH RBC QN AUTO: 27.5 PG (ref 28–32)
MCHC RBC AUTO-ENTMCNC: 32 G/DL (ref 31–35)
MCV RBC AUTO: 86 FL (ref 81–99)
MONOCYTES # BLD AUTO: 0.5 10*3/UL (ref 0.2–0.8)
MONOCYTES NFR BLD AUTO: 2.7 % (ref 4.4–11.3)
NEUTS SEG NFR BLD AUTO: 92.3 % (ref 38.7–80)
PLAT MORPH BLD: (no result)
PLATELET # BLD AUTO: 195 X10E3/UL (ref 140–360)
PLATELET # BLD EST: ADEQUATE 10*3/UL
POIKILOCYTOSIS BLD QL SMEAR: SLIGHT
POTASSIUM SERPL-SCNC: 4.4 MMOL/L (ref 3.5–5.1)
RBC # BLD AUTO: 3.42 X10E6/UL (ref 3.6–5.1)
RBC MORPH BLD: (no result)
SMUDGE CELLS BLD QL SMEAR: (no result)
SODIUM SERPL-SCNC: 131 MMOL/L (ref 136–145)

## 2018-05-30 RX ADMIN — HYDROCODONE BITARTRATE AND ACETAMINOPHEN PRN EA: 5; 325 TABLET ORAL at 12:30

## 2018-05-30 RX ADMIN — VANCOMYCIN HYDROCHLORIDE SCH MG: 250 CAPSULE ORAL at 20:52

## 2018-05-30 RX ADMIN — HYDROCODONE BITARTRATE AND ACETAMINOPHEN PRN EA: 5; 325 TABLET ORAL at 08:24

## 2018-05-30 RX ADMIN — METOPROLOL SUCCINATE SCH MG: 50 TABLET, EXTENDED RELEASE ORAL at 08:12

## 2018-05-30 RX ADMIN — LIDOCAINE SCH EA: 50 PATCH CUTANEOUS at 08:12

## 2018-05-30 RX ADMIN — PANTOPRAZOLE SODIUM SCH MG: 40 TABLET, DELAYED RELEASE ORAL at 08:12

## 2018-05-30 RX ADMIN — DEXTROSE AND SODIUM CHLORIDE SCH MLS/HR: 5; 450 INJECTION, SOLUTION INTRAVENOUS at 06:45

## 2018-05-30 RX ADMIN — LEVOTHYROXINE SODIUM SCH MCG: 50 TABLET ORAL at 05:43

## 2018-05-30 RX ADMIN — PRAMIPEXOLE DIHYDROCHLORIDE SCH MG: 0.25 TABLET ORAL at 20:52

## 2018-05-30 RX ADMIN — HYDROCODONE BITARTRATE AND ACETAMINOPHEN PRN EA: 5; 325 TABLET ORAL at 18:17

## 2018-05-30 RX ADMIN — VANCOMYCIN HYDROCHLORIDE SCH MG: 250 CAPSULE ORAL at 05:44

## 2018-05-30 RX ADMIN — VANCOMYCIN HYDROCHLORIDE SCH MG: 250 CAPSULE ORAL at 12:14

## 2018-05-30 RX ADMIN — HYDROCODONE BITARTRATE AND ACETAMINOPHEN PRN EA: 5; 325 TABLET ORAL at 22:45

## 2018-05-30 NOTE — PROGRESS NOTE
DATE:    



CARDIOLOGY PROGRESS NOTE



SUBJECTIVE:  Patient complains of being tired and slightly nauseated.  

Denies any chest pain or shortness of breath.



OBJECTIVE   

VITAL SIGNS:  Temperature 98.9, pulse 107, respiratory rate 20.  Blood 

pressure 169/77.  Oxygen saturation 91% on room air.



CARDIOVASCULAR MEDICATIONS 

1. Levothyroxine 50 mcg p.o. daily.  

2. Metoprolol 100 mg p.o. daily.



LABS:  WBC 17.31, hemoglobin 9.4, hematocrit 29.4, platelets 195.  Sodium 

131, potassium 4.4, BUN 31, creatinine 2.78.  GFR 16.  Chest x-ray from 

yesterday showed hyperinflated lungs consistent with COPD.  Continued 

improvement in the right upper lobe airspace opacity.  Interval development 

of a small left pleural effusion, likely associated with left lower lobe 

atelectasis.  Improvement in mild central pulmonary venous congestion.  



TELEMETRY:  Sinus rhythm.  



PHYSICAL EXAMINATION 

GENERAL:  Alert and oriented times 3, resting comfortably in bed, does not 

appear to be in acute distress. 

NECK:  Supple.  No JVD noted. 

CARDIOVASCULAR:  Regular rate and rhythm.  Normal S1 and S2.  S3 and S4 

auscultated. 

LUNGS:  Clear to auscultation throughout.  No wheezing.  No rhonchi.  

ABDOMEN:  Soft, nontender.  Normoactive bowel sounds.  

EXTREMITIES:  Lower extremities have trace edema bilaterally.  Edema noted 

to the right upper extremity with some improvement.  



IMPRESSION 

1. Right upper lobe pneumonia.

2. Thromboembolic disorder.

3. Acute-on-chronic diastolic heart failure.

4. Anemia.

5. Acute renal failure, currently on hemodialysis.



RECOMMENDATIONS:  Continue the above list of cardiac medications.  Patient 

will need a repeat echocardiogram today.  Volume management per nephrology. 

 Antimicrobial therapy per primary team.  Will continue to follow this 

patient closely and maintain on telemetry at all times.  

Dictated by Nahomi Villalta NP. 







DD:  05/30/2018 09:26

DT:  05/30/2018 09:53

Job#:  [id number]

## 2018-05-31 VITALS — SYSTOLIC BLOOD PRESSURE: 154 MMHG | DIASTOLIC BLOOD PRESSURE: 69 MMHG

## 2018-05-31 VITALS — DIASTOLIC BLOOD PRESSURE: 57 MMHG | SYSTOLIC BLOOD PRESSURE: 122 MMHG

## 2018-05-31 VITALS — SYSTOLIC BLOOD PRESSURE: 122 MMHG | DIASTOLIC BLOOD PRESSURE: 57 MMHG

## 2018-05-31 VITALS — DIASTOLIC BLOOD PRESSURE: 63 MMHG | SYSTOLIC BLOOD PRESSURE: 136 MMHG

## 2018-05-31 VITALS — SYSTOLIC BLOOD PRESSURE: 146 MMHG | DIASTOLIC BLOOD PRESSURE: 69 MMHG

## 2018-05-31 VITALS — DIASTOLIC BLOOD PRESSURE: 53 MMHG | SYSTOLIC BLOOD PRESSURE: 100 MMHG

## 2018-05-31 VITALS — DIASTOLIC BLOOD PRESSURE: 60 MMHG | SYSTOLIC BLOOD PRESSURE: 118 MMHG

## 2018-05-31 VITALS — DIASTOLIC BLOOD PRESSURE: 69 MMHG | SYSTOLIC BLOOD PRESSURE: 143 MMHG

## 2018-05-31 VITALS — SYSTOLIC BLOOD PRESSURE: 136 MMHG | DIASTOLIC BLOOD PRESSURE: 63 MMHG

## 2018-05-31 LAB
ANION GAP SERPL CALC-SCNC: 10.3 MMOL/L (ref 8–16)
ANISOCYTOSIS BLD QL SMEAR: SLIGHT
BASOPHILS # BLD AUTO: 0.1 10*3/UL (ref 0–0.1)
BASOPHILS NFR BLD AUTO: 0.3 % (ref 0–1)
BUN SERPL-MCNC: 32 MG/DL (ref 7–26)
BUN/CREAT SERPL: 13 (ref 6–25)
CALCIUM SERPL-MCNC: 8.2 MG/DL (ref 8.4–10.2)
CHLORIDE SERPL-SCNC: 99 MMOL/L (ref 98–107)
CO2 SERPL-SCNC: 24 MMOL/L (ref 22–29)
DEPRECATED NEUTROPHILS # BLD AUTO: 30.7 10*3/UL (ref 2.1–6.9)
EGFRCR SERPLBLD CKD-EPI 2021: 19 ML/MIN (ref 60–?)
EOSINOPHIL # BLD AUTO: 0.1 10*3/UL (ref 0–0.4)
EOSINOPHIL NFR BLD AUTO: 0.2 % (ref 0–6)
ERYTHROCYTE [DISTWIDTH] IN CORD BLOOD: 22.5 % (ref 11.7–14.4)
GLUCOSE SERPLBLD-MCNC: 82 MG/DL (ref 74–118)
HCT VFR BLD AUTO: 29.2 % (ref 34.2–44.1)
HGB BLD-MCNC: 9.1 G/DL (ref 12–16)
HYPOCHROMIA BLD QL SMEAR: SLIGHT
LYMPHOCYTES # BLD: 0.6 10*3/UL (ref 1–3.2)
LYMPHOCYTES NFR BLD AUTO: 1.6 % (ref 18–39.1)
LYMPHOCYTES NFR BLD MANUAL: 2 % (ref 19–48)
MAGNESIUM SERPL-MCNC: 1.4 MG/DL (ref 1.3–2.1)
MCH RBC QN AUTO: 27.5 PG (ref 28–32)
MCHC RBC AUTO-ENTMCNC: 31.2 G/DL (ref 31–35)
MCV RBC AUTO: 88.2 FL (ref 81–99)
METAMYELOCYTES NFR BLD MANUAL: 1 % (ref 0–0)
MONOCYTES # BLD AUTO: 1.8 10*3/UL (ref 0.2–0.8)
MONOCYTES NFR BLD AUTO: 5.1 % (ref 4.4–11.3)
MONOCYTES NFR BLD MANUAL: 5 % (ref 3.4–9)
MYELOCYTES NFR BLD MANUAL: 1 % (ref 0–0)
NEUTS BAND NFR BLD MANUAL: 6 %
NEUTS SEG NFR BLD AUTO: 88.6 % (ref 38.7–80)
NEUTS SEG NFR BLD MANUAL: 85 % (ref 40–74)
PLAT MORPH BLD: NORMAL
PLATELET # BLD AUTO: 149 X10E3/UL (ref 140–360)
PLATELET # BLD EST: (no result) 10*3/UL
POTASSIUM SERPL-SCNC: 4.3 MMOL/L (ref 3.5–5.1)
RBC # BLD AUTO: 3.31 X10E6/UL (ref 3.6–5.1)
RBC MORPH BLD: NORMAL
SODIUM SERPL-SCNC: 129 MMOL/L (ref 136–145)

## 2018-05-31 RX ADMIN — HYDROCODONE BITARTRATE AND ACETAMINOPHEN PRN EA: 5; 325 TABLET ORAL at 21:23

## 2018-05-31 RX ADMIN — DEXTROSE AND SODIUM CHLORIDE SCH MLS/HR: 5; 450 INJECTION, SOLUTION INTRAVENOUS at 14:10

## 2018-05-31 RX ADMIN — HYDROCODONE BITARTRATE AND ACETAMINOPHEN PRN EA: 5; 325 TABLET ORAL at 04:43

## 2018-05-31 RX ADMIN — METRONIDAZOLE SCH MLS/HR: 500 INJECTION, SOLUTION INTRAVENOUS at 21:23

## 2018-05-31 RX ADMIN — METOPROLOL SUCCINATE SCH MG: 50 TABLET, EXTENDED RELEASE ORAL at 09:45

## 2018-05-31 RX ADMIN — VANCOMYCIN HYDROCHLORIDE SCH MG: 250 CAPSULE ORAL at 14:11

## 2018-05-31 RX ADMIN — LIDOCAINE SCH EA: 50 PATCH CUTANEOUS at 09:45

## 2018-05-31 RX ADMIN — PANTOPRAZOLE SODIUM SCH MG: 40 TABLET, DELAYED RELEASE ORAL at 07:43

## 2018-05-31 RX ADMIN — LEVOTHYROXINE SODIUM SCH MCG: 50 TABLET ORAL at 05:48

## 2018-05-31 RX ADMIN — VANCOMYCIN HYDROCHLORIDE SCH MG: 250 CAPSULE ORAL at 05:48

## 2018-05-31 RX ADMIN — HYDROCODONE BITARTRATE AND ACETAMINOPHEN PRN EA: 5; 325 TABLET ORAL at 09:45

## 2018-05-31 RX ADMIN — VANCOMYCIN HYDROCHLORIDE SCH MG: 250 CAPSULE ORAL at 21:15

## 2018-05-31 RX ADMIN — PRAMIPEXOLE DIHYDROCHLORIDE SCH MG: 0.25 TABLET ORAL at 21:23

## 2018-05-31 NOTE — PROGRESS NOTE
DATE:    



CARDIOLOGY PROGRESS NOTE



SUBJECTIVE:  The patient reports right upper extremity continued swelling.  

Otherwise, no other complaint.  



OBJECTIVE  

VITAL SIGNS:  Temperature 97.0, pulse 95, respiratory rate 16, blood 

pressure 136/63, oxygen saturation 92% on room air. 



CARDIAC MEDICATIONS:  Metoprolol 100 mg p.o. daily. 



LABS:  WBC 34.61, hemoglobin 9.1, hematocrit 29.2, platelets 149.  Sodium 

129, potassium 4.3, BUN 32, creatinine 2.40.  GFR 19.  Magnesium 1.4.



X-ray of the back from today with marked levo curvature with severe 

underlying degenerative changes.



TELEMETRY:  Sinus rhythm.



PHYSICAL EXAMINATION 

GENERAL:  Alert and oriented times 3, resting comfortably in bed, appears 

to be tired.

NECK:  Supple.  No JVD noted. 

CARDIOVASCULAR:  Irregular rate and rhythm, S1 and S2 noted.  S4 is 

auscultated.

LUNGS:  Left lower lobe with fine crackles noted.  Otherwise, clear to 

auscultation.  No wheezing, no rhonchi. 

ABDOMEN:  Rounded, soft, nontender.  

LOWER EXTREMITIES:  There is 2+ nonpitting edema bilaterally. 



IMPRESSION

1. Right upper lobe pneumonia.

2. Thromboembolic disorder.

3. Acute-on-chronic diastolic heart failure.

4. Anemia.

5. Acute renal failure.

6. Clostridium difficile. 

7. Leukocytosis. 



RECOMMENDATIONS:  Infectious disease has been consulted.  Blood culture is 

being obtained today.  Volume management per nephrology.  Continue 

antimicrobial therapy.  Monitor white blood count closely.  We will 

continue to follow this patient very closely.



Dictated by Nahomi Villalta NP. 









DD:  05/31/2018 09:08

DT:  05/31/2018 09:43

Job#:  [id number]

## 2018-05-31 NOTE — DIAGNOSTIC IMAGING REPORT
Non-tunneled Central Venous Catheter Placement 5/24/2018



Pre-Procedure Diagnosis: Acute renal failure; poor IV access

Post-procedure Diagnosis:Acute renal failure; poor IV access



: CHARMAINE Lynne

Assistant: None

Sedation: None. 1% lidocaine local anesthesia.



Estimate blood loss: <5 mL Blood administered: None

Complications: None

Implants/Grafts: 16 cm 7-Amharic 3 lumen CVC

Specimen: None





Procedure:

Informed consent was obtained and the patient positioned supine in the ICU. A

timeout was performed, followed by preliminary ultrasound of the right internal

jugular vein (see findings below). The right neck was prepped and draped in

standard fashion.



Using real-time ultrasound guidance a 18 gauge vascular needle was used to

access the  right internal jugular vein. An image was stored in the electronic

medical record. A wire was advanced while monitoring the patient's cardiac

rhythm and the needle exchanged for a non-tunneled central venous catheter

using standard Seldinger technique.



At the end of the procedure the catheter was flushed, secured to the skin and a

sterile dressing applied. The patient tolerated the procedure well and without

immediate complication.



Findings:

Patent right internal jugular vein as demonstrated by normal ultrasound

compressibility.



Impression:

1.  Successful placement of a non-tunneled right internal jugular central

venous catheter using ultrasound guidance.

2.  The patient had an indwelling right femoral non-tunneled dialysis catheter

in place at the time of central line placement; Trialysis catheter placement

was therefore deferred.









This report was generated with voice-recognition technology. Errors in

transcription can occur. Please interpret accordingly and contact a radiologist

if there are any questions regarding the report.



Signed by: Dr. Andrea Lynne M.D. on 5/24/2018 8:06 PM

## 2018-05-31 NOTE — DIAGNOSTIC IMAGING REPORT
SP LUMBAR, COMPLETE MIN 4VW



Comparison: None

Clinical history: \S\LOWER BACK PAIN SO WANT L-S SPINE SERIES PLAIN FILM XRAYS

\S\[id number] \S\[id number]



Findings:

Marked apex levocurvature of the spine centered at L1. Severe multilevel

degenerative disc disease and facet arthrosis throughout the lumbar spine.



Degenerative changes are seen about the hips. Atherosclerotic calcifications.



Impression:

Marked levocurvature with severe underlying degenerative changes.



Signed by: Dr Arelis Kilpatrick MD on 5/31/2018 6:58 AM

## 2018-05-31 NOTE — CONSULTATION
DATE OF CONSULTATION:    



REASON FOR CONSULTATION:  Leukocytosis.



HISTORY OF PRESENT ILLNESS:  This patient who is currently in the hospital, 

she was admitted on May 24, 2018.  She is an 83-year-old who has 

complicated medical history, comes in with general feeling of not feeling 

well.  Patient has nausea, vomiting, diarrhea.  She was found to have 

hyperkalemia and she was seen by renal.  She does have history of UTI 

before, colitis before, diverticulosis, colonoscopies, UTI with Pseudomonas 

aeruginosa.



ALLERGIES:  STATIN, LEVAQUIN.



SOCIAL HISTORY:  There is no smoking, drug abuse, alcohol abuse.



FAMILY HISTORY:  Hypertension.



The patient was admitted with the feeling of not feeling well.  The patient 

was found to have hyperkalemia, chronic kidney disease with ATN.  She was 

seen by cardiology and as well as renal.  For the last couple of days, her 

white count was getting progressively worse, so infectious disease was 

consulted.  The patient who is not a good source of information, history 

was taken mainly from the chart.



PAST MEDICAL HISTORY:  She does have history of hypertension, 

hypercholesterolemia, hypothyroidism, DVT, blood clotting, gout.



REVIEW OF SYSTEMS:  She is just feeling very weak.

HEENT:  There is no headache, visual changes, hearing changes.

GI:  Has some nausea.  Has some abdominal discomfort.

:  Unremarkable.



LABORATORY DATA:  Reviewed.  Her C. diff was positive.  Her white count is 

54.6, hemoglobin 9.1.  Her sodium 129, potassium 4.3, creatinine of 2.4.



PHYSICAL EXAMINATION:

GENERAL:  She is currently alert.

VITAL SIGNS:  Stable, afebrile.  Temperature 95.9, heart rate 74, 

respirations 18.

HEENT:  Normocephalic.

NECK:  Supple.

CHEST:  Clear.

COR:  S1 and S2.  No murmur.

ABDOMEN:  Soft.

EXTREMITIES:  __________ edema.



IMPRESSION:

1. Clostridium difficile colitis.

2. Sepsis.



Will put on vancomycin 250 p.o. q.6., Flagyl 500 IV q.8h.  Recheck complete 

blood cell count, recheck chemistry panel.  Gentle hydration and supportive 

care.  Hold off any other antibiotic for the time being.



Will follow with you.







DD:  05/31/2018 22:19

DT:  05/31/2018 23:00

Job#:  [id number]

## 2018-06-01 VITALS — DIASTOLIC BLOOD PRESSURE: 43 MMHG | SYSTOLIC BLOOD PRESSURE: 106 MMHG

## 2018-06-01 VITALS — SYSTOLIC BLOOD PRESSURE: 119 MMHG | DIASTOLIC BLOOD PRESSURE: 66 MMHG

## 2018-06-01 VITALS — DIASTOLIC BLOOD PRESSURE: 63 MMHG | SYSTOLIC BLOOD PRESSURE: 135 MMHG

## 2018-06-01 VITALS — SYSTOLIC BLOOD PRESSURE: 113 MMHG | DIASTOLIC BLOOD PRESSURE: 48 MMHG

## 2018-06-01 VITALS — SYSTOLIC BLOOD PRESSURE: 104 MMHG | DIASTOLIC BLOOD PRESSURE: 42 MMHG

## 2018-06-01 VITALS — DIASTOLIC BLOOD PRESSURE: 53 MMHG | SYSTOLIC BLOOD PRESSURE: 108 MMHG

## 2018-06-01 VITALS — SYSTOLIC BLOOD PRESSURE: 118 MMHG | DIASTOLIC BLOOD PRESSURE: 57 MMHG

## 2018-06-01 VITALS — SYSTOLIC BLOOD PRESSURE: 112 MMHG | DIASTOLIC BLOOD PRESSURE: 76 MMHG

## 2018-06-01 VITALS — SYSTOLIC BLOOD PRESSURE: 100 MMHG | DIASTOLIC BLOOD PRESSURE: 67 MMHG

## 2018-06-01 VITALS — DIASTOLIC BLOOD PRESSURE: 55 MMHG | SYSTOLIC BLOOD PRESSURE: 114 MMHG

## 2018-06-01 VITALS — DIASTOLIC BLOOD PRESSURE: 55 MMHG | SYSTOLIC BLOOD PRESSURE: 127 MMHG

## 2018-06-01 VITALS — DIASTOLIC BLOOD PRESSURE: 55 MMHG | SYSTOLIC BLOOD PRESSURE: 108 MMHG

## 2018-06-01 VITALS — DIASTOLIC BLOOD PRESSURE: 65 MMHG | SYSTOLIC BLOOD PRESSURE: 107 MMHG

## 2018-06-01 VITALS — DIASTOLIC BLOOD PRESSURE: 52 MMHG | SYSTOLIC BLOOD PRESSURE: 109 MMHG

## 2018-06-01 VITALS — SYSTOLIC BLOOD PRESSURE: 123 MMHG | DIASTOLIC BLOOD PRESSURE: 56 MMHG

## 2018-06-01 VITALS — DIASTOLIC BLOOD PRESSURE: 72 MMHG | SYSTOLIC BLOOD PRESSURE: 129 MMHG

## 2018-06-01 VITALS — SYSTOLIC BLOOD PRESSURE: 109 MMHG | DIASTOLIC BLOOD PRESSURE: 50 MMHG

## 2018-06-01 VITALS — SYSTOLIC BLOOD PRESSURE: 124 MMHG | DIASTOLIC BLOOD PRESSURE: 58 MMHG

## 2018-06-01 VITALS — DIASTOLIC BLOOD PRESSURE: 59 MMHG | SYSTOLIC BLOOD PRESSURE: 130 MMHG

## 2018-06-01 VITALS — DIASTOLIC BLOOD PRESSURE: 34 MMHG | SYSTOLIC BLOOD PRESSURE: 115 MMHG

## 2018-06-01 VITALS — SYSTOLIC BLOOD PRESSURE: 108 MMHG | DIASTOLIC BLOOD PRESSURE: 40 MMHG

## 2018-06-01 VITALS — SYSTOLIC BLOOD PRESSURE: 120 MMHG | DIASTOLIC BLOOD PRESSURE: 53 MMHG

## 2018-06-01 VITALS — DIASTOLIC BLOOD PRESSURE: 52 MMHG | SYSTOLIC BLOOD PRESSURE: 103 MMHG

## 2018-06-01 VITALS — SYSTOLIC BLOOD PRESSURE: 129 MMHG | DIASTOLIC BLOOD PRESSURE: 72 MMHG

## 2018-06-01 VITALS — DIASTOLIC BLOOD PRESSURE: 50 MMHG | SYSTOLIC BLOOD PRESSURE: 127 MMHG

## 2018-06-01 VITALS — DIASTOLIC BLOOD PRESSURE: 67 MMHG | SYSTOLIC BLOOD PRESSURE: 100 MMHG

## 2018-06-01 VITALS — DIASTOLIC BLOOD PRESSURE: 55 MMHG | SYSTOLIC BLOOD PRESSURE: 120 MMHG

## 2018-06-01 VITALS — DIASTOLIC BLOOD PRESSURE: 84 MMHG | SYSTOLIC BLOOD PRESSURE: 104 MMHG

## 2018-06-01 VITALS — DIASTOLIC BLOOD PRESSURE: 53 MMHG | SYSTOLIC BLOOD PRESSURE: 115 MMHG

## 2018-06-01 VITALS — DIASTOLIC BLOOD PRESSURE: 49 MMHG | SYSTOLIC BLOOD PRESSURE: 129 MMHG

## 2018-06-01 LAB
ALBUMIN SERPL-MCNC: 2 G/DL (ref 3.5–5)
ALBUMIN/GLOB SERPL: 0.7 {RATIO} (ref 0.8–2)
ALP SERPL-CCNC: 103 IU/L (ref 40–150)
ALT SERPL-CCNC: 12 IU/L (ref 0–55)
ANION GAP SERPL CALC-SCNC: 15 MMOL/L (ref 8–16)
ANISOCYTOSIS BLD QL SMEAR: (no result)
BASE EXCESS BLDA CALC-SCNC: -3 MMOL/L (ref -2–3)
BASE EXCESS BLDA CALC-SCNC: -4 MMOL/L (ref -2–3)
BASOPHILS # BLD AUTO: 0.1 10*3/UL (ref 0–0.1)
BASOPHILS NFR BLD AUTO: 0.3 % (ref 0–1)
BUN SERPL-MCNC: 38 MG/DL (ref 7–26)
BUN/CREAT SERPL: 14 (ref 6–25)
CALCIUM SERPL-MCNC: 8.7 MG/DL (ref 8.4–10.2)
CHLORIDE SERPL-SCNC: 96 MMOL/L (ref 98–107)
CO2 SERPL-SCNC: 20 MMOL/L (ref 22–29)
DEPRECATED NEUTROPHILS # BLD AUTO: 25.4 10*3/UL (ref 2.1–6.9)
EGFRCR SERPLBLD CKD-EPI 2021: 17 ML/MIN (ref 60–?)
EOSINOPHIL # BLD AUTO: 0 10*3/UL (ref 0–0.4)
EOSINOPHIL NFR BLD AUTO: 0 % (ref 0–6)
ERYTHROCYTE [DISTWIDTH] IN CORD BLOOD: 22.2 % (ref 11.7–14.4)
GLOBULIN PLAS-MCNC: 3 G/DL (ref 2.3–3.5)
GLUCOSE SERPLBLD-MCNC: 68 MG/DL (ref 74–118)
HCO3 BLDA-SCNC: 22 MMOL/L (ref 23–28)
HCO3 BLDA-SCNC: 26 MMOL/L (ref 23–28)
HCT VFR BLD AUTO: 26.7 % (ref 34.2–44.1)
HGB BLD-MCNC: 8.4 G/DL (ref 12–16)
HYPOCHROMIA BLD QL SMEAR: SLIGHT
LYMPHOCYTES # BLD: 0.2 10*3/UL (ref 1–3.2)
LYMPHOCYTES NFR BLD AUTO: 0.6 % (ref 18–39.1)
MCH RBC QN AUTO: 27.8 PG (ref 28–32)
MCHC RBC AUTO-ENTMCNC: 31.5 G/DL (ref 31–35)
MCV RBC AUTO: 88.4 FL (ref 81–99)
MONOCYTES # BLD AUTO: 0.3 10*3/UL (ref 0.2–0.8)
MONOCYTES NFR BLD AUTO: 1 % (ref 4.4–11.3)
MONOCYTES NFR BLD MANUAL: 2 % (ref 3.4–9)
NEUTS BAND NFR BLD MANUAL: 9 %
NEUTS SEG NFR BLD AUTO: 91 % (ref 38.7–80)
NEUTS SEG NFR BLD MANUAL: 89 % (ref 40–74)
NRBC/RBC NFR BLD MANUAL: 1 %
PCO2 BLDA: 104 MMHG (ref 80–105)
PCO2 BLDA: 148 MMHG (ref 80–105)
PCO2 BLDA: 39 MMHG (ref 41–51)
PCO2 BLDA: 77 MMHG (ref 41–51)
PH BLDA: 7.13 [PH] (ref 7.31–7.41)
PH BLDA: 7.37 [PH] (ref 7.31–7.41)
PLAT MORPH BLD: NORMAL
PLATELET # BLD AUTO: 105 X10E3/UL (ref 140–360)
PLATELET # BLD EST: (no result) 10*3/UL
POIKILOCYTOSIS BLD QL SMEAR: SLIGHT
POTASSIUM SERPL-SCNC: 4 MMOL/L (ref 3.5–5.1)
RBC # BLD AUTO: 3.02 X10E6/UL (ref 3.6–5.1)
RBC MORPH BLD: (no result)
SAO2 % BLDA: 95 % (ref 95–98)
SAO2 % BLDA: 99 % (ref 95–98)
SODIUM SERPL-SCNC: 127 MMOL/L (ref 136–145)

## 2018-06-01 PROCEDURE — 30233N1 TRANSFUSION OF NONAUTOLOGOUS RED BLOOD CELLS INTO PERIPHERAL VEIN, PERCUTANEOUS APPROACH: ICD-10-PCS | Performed by: INTERNAL MEDICINE

## 2018-06-01 PROCEDURE — 5A09357 ASSISTANCE WITH RESPIRATORY VENTILATION, LESS THAN 24 CONSECUTIVE HOURS, CONTINUOUS POSITIVE AIRWAY PRESSURE: ICD-10-PCS | Performed by: INTERNAL MEDICINE

## 2018-06-01 RX ADMIN — METRONIDAZOLE SCH MLS/HR: 500 INJECTION, SOLUTION INTRAVENOUS at 21:28

## 2018-06-01 RX ADMIN — CASTOR OIL AND BALSAM, PERU SCH GM: 788; 87 OINTMENT TOPICAL at 21:29

## 2018-06-01 RX ADMIN — MEROPENEM SCH MG: 500 INJECTION INTRAVENOUS at 09:15

## 2018-06-01 RX ADMIN — SODIUM CHLORIDE SCH MLS/HR: 9 INJECTION, SOLUTION INTRAVENOUS at 21:29

## 2018-06-01 RX ADMIN — LIDOCAINE SCH EA: 50 PATCH CUTANEOUS at 10:33

## 2018-06-01 RX ADMIN — HYDROCODONE BITARTRATE AND ACETAMINOPHEN PRN EA: 5; 325 TABLET ORAL at 21:36

## 2018-06-01 RX ADMIN — VANCOMYCIN HYDROCHLORIDE SCH MG: 250 CAPSULE ORAL at 06:00

## 2018-06-01 RX ADMIN — PRAMIPEXOLE DIHYDROCHLORIDE SCH MG: 0.25 TABLET ORAL at 21:29

## 2018-06-01 RX ADMIN — LEVOTHYROXINE SODIUM SCH MCG: 50 TABLET ORAL at 06:00

## 2018-06-01 RX ADMIN — METRONIDAZOLE SCH MLS/HR: 500 INJECTION, SOLUTION INTRAVENOUS at 12:00

## 2018-06-01 RX ADMIN — VANCOMYCIN HYDROCHLORIDE SCH MG: 250 CAPSULE ORAL at 21:34

## 2018-06-01 RX ADMIN — METRONIDAZOLE SCH MLS/HR: 500 INJECTION, SOLUTION INTRAVENOUS at 04:32

## 2018-06-01 RX ADMIN — VANCOMYCIN HYDROCHLORIDE SCH MG: 250 CAPSULE ORAL at 14:00

## 2018-06-01 RX ADMIN — PANTOPRAZOLE SODIUM SCH MG: 40 TABLET, DELAYED RELEASE ORAL at 08:00

## 2018-06-01 NOTE — PROGRESS NOTE
DATE:    



CARDIOLOGY PROGRESS NOTE



SUBJECTIVE:  Patient reports back pain and shortness of breath.  She is 

currently on BiPAP. 



OBJECTIVE:  VITAL SIGNS:  Temperature 97.4, pulse 92, respiratory rate 18, 

blood pressure 109/50, oxygen saturation 100% on BiPAP.  



CARDIOVASCULAR MEDICATIONS 

1. Levothyroxine 50 mcg p.o. daily.  

2. Metoprolol succinate discontinued today. 



LABS:  WBC 27.93, hemoglobin 8.4, hematocrit 26.7, platelets 105,000.  

Sodium 127, potassium 4.0, BUN 38, creatinine 2.67, AST 23, ALT 12.  BNP 

2034.6.  Chest x-ray with worsening interstitial pulmonary edema and 

increasing small bilateral pleural effusions and stable pulmonary 

hyperinflation.  Abdominal x-ray with a nonobstructive bowel pattern.  

Telemetry, sinus rhythm. 



PHYSICAL EXAM

GENERAL:  Awake and alert, increased effort of breathing noted.  Family at 

the bedside.  

NECK:  Supple.  No JVD noted.  

LUNGS:  Diminished breath sounds anterior lower lobes.  Fine scattered 

crackles.  Otherwise, clear to auscultation. 

CARDIOVASCULAR:  Regular rate and rhythm.  Normal S1, S2.  S3 and S4 noted. 

 

ABDOMEN:  Rounded, soft, nontender 

LOWER EXTREMITIES:  2+ pitting edema bilaterally. 



IMPRESSIONS

1. Right upper lobe pneumonia. 

2. Pulmonary congestion .

3. Thromboembolic disorder.  

4. Acute-on-chronic diastolic heart failure with most recent 

echocardiogram with left ventricular ejection fraction of 60-65.

5. Anemia secondary to renal disease. 

6. Acute renal failure.

7. Clostridium difficile. 

8. Sepsis.

9. Respiratory failure. 



RECOMMENDATION:  Patient has signed DNR orders.  Transfer to ICU for higher 

level of care.  Continue with the above-listed cardiac medications.  Okay 

to discontinue beta-blockers in the light of sepsis and hypotension.  

Volume management per nephrology.  Continue antimicrobial therapy.  

Infectious disease has been consulted.  Monitor very closely.  





Dictated By:  Nahomi Villalta NP 











DD:  06/01/2018 17:29

DT:  06/01/2018 18:19

Job#:  [id number]

## 2018-06-01 NOTE — DIAGNOSTIC IMAGING REPORT
PROCEDURE: CHEST SINGLE (PORTABLE)

COMPARISON: 5/29/2018.

INDICATIONS: HYPERKALEMIA

 

FINDINGS:



Stable appearance of right internal jugular central venous catheter.

 

Worsening interstitial pulmonary edema and small bilateral pleural 

effusions relative to 5/29/2018. No new consolidation. Stable 

cardiomediastinal contour.

 

No acute osseous abnormality.

 

CONCLUSION:

1. Worsening interstitial pulmonary edema and increasing small 

bilateral pleural effusions relative to 5/29/2018.

 

2. Stable pulmonary hyperinflation. 

 

Dictated by:  Josh Spear M.D. on 6/01/2018 at 7:52     

Electronically approved by:  Josh Spear M.D. on 6/01/2018 at 7:52

## 2018-06-01 NOTE — CONSULTATION
DATE OF CONSULTATION:    



PULMONARY/CRITICAL CARE CONSULTATION 



REASON FOR CONSULTATION:  ICU management.  



HISTORY OF PRESENT ILLNESS:  Ms. Arthur is an 83-year-old female known to 

me from previous admission.  Patient was on the medical floor being treated 

for C diff colitis and was transferred here for hypoxia and shortness of 

breath and respiratory failure.



Ms. Arthur is an 83-year-old female.  She has a history of smoking, 

around 35-pack-year smoking history.  She quit in 1990s.  This time she 

came in initially with diarrhea and was diagnosed with C diff colitis.  She 

had an EGD done, and Dr. Zhao has been following.  Initially when she 

came in, she was severely hyperkalemic and underwent dialysis, which was 

discontinued and patient was transferred to medical floor.  Early morning 

today, she became hypoxic, short of breath, obtunded.  Patient was started 

on BiPAP.  ABG showed a pH of 7.13, pCO2 of 77, and it has improved to a pH 

of 7.37 and pCO2 of 39 after the BiPAP use.  She is awake and alert and 

following commands now.  I have reviewed the chest x-ray film, which is 

showing evidence of bilateral lower lobe infiltrate, right more than the 

left, and then right-sided pleural effusion.



REVIEW OF SYSTEMS:   

GENERAL:  Denies any fever or chills.

HEAD:  Denies any head trauma.  

ENT:  Denies any earache.  

CVS:  Denies any chest pain.  

RESPIRATORY:  Shortness of breath. 

GI:  Denies any nausea or vomiting.  

REST OF THE REVIEW OF SYSTEMS:  Are negative except as in history of 

present illness. 



PAST MEDICAL HISTORY:  Hypertension, hypothyroidism, COPD, history of DVT 

and PE in the past, chronic diastolic heart failure.  Patient's last echo 

was done on the 30th, which is not read.  The echo before that was done in 

2017.  Dr. Edmondson's note says the patient has acute-on-chronic diastolic 

heart failure as well and history of atrial fibrillation.



FAMILY AND SOCIAL HISTORY:  Ex-smoker.  Does not drink.  Denies any family 

history of heart disease.  Family is at bedside.



PHYSICAL EXAMINATION:   

VITALS:  Temperature 96.4, pulse of 78, blood pressure 109/50, respiratory 

rate of 18, O2 sat 100% on 60% FIO2, and heart rate is in 90s.

GENERAL:  She is awake and alert and following commands. 

HEENT:  Head atraumatic, normocephalic.

NECK:  Supple.  No JVD.  Oral mucosa is dry.

CHEST:  Is clear to auscultation bilaterally.  No wheezing.  Crackles on 

the bases and reduced air entry on the bases.

HEART:  S1/S2 audible.  

ABDOMEN:  Soft, nontender, nondistended.  

EXTREMITIES:  No clubbing, cyanosis.  Trace edema.

NEUROLOGIC:  Awake and alert.  Following commands. 



LABORATORY DATA:  pH has improved to 7.37, and pCO2 is now 40.  Chemistry 

is showing sodium of 127, potassium 4.0, chloride 96, BUN 38, creatinine 

2.67.  BNP is 2034.  White count is 27,000, hemoglobin 8.4, platelets 105.  





CHEST X-RAY:  I have reviewed the films, and it is showing evidence of 

bilateral lower lobe infiltrate.  Compared to May 29, these are new.  The 

acuity of development suggests that possibly it is fluid, but underlying 

pneumonia cannot be excluded.



ASSESSMENT AND PLAN:  Ms. Arthur is an 83-year-old female who was 

initially admitted for diarrhea, is being treated for C diff colitis, 

became acutely hypoxic and hypercapnic with shortness of breath, started on 

BiPAP and transferred to ICU.



CURRENT PROBLEMS:  

1. Acute-on-chronic hypercapnic respiratory failure.

2. Acute kidney injury.

3. Acute-on-chronic diastolic heart failure.

4. Possibly pneumonia on the right side, aspiration versus heart 

failure.

5. History of chronic obstructive pulmonary disease.

6. Hypothyroidism.

7. History of deep vein thrombosis and pulmonary embolism.

8. History of atrial fibrillation.  



PLAN:   

1. Continue the patient on BiPAP for now.  I have reviewed the settings 

and changed the settings at bedside.  Patient is comfortable, awake and 

alert.  Will attempt to try on nasal cannula if she tolerates.

2. Vasopressors if needed.

3. Discussed with Dr. Johsnon, no need for hemodialysis at this point.

4. Defer diuretics to Nephrology.  Blood pressure is marginal.

5. Patient is already on IV meropenem and Levaquin, which will be 

continued.  It was started earlier this morning. 

6. Patient is being treated for C diff colitis with p.o. vancomycin and 

IV Flagyl as well.  



Discussed with patient's family at bedside in detail.  Critical care time 

spent 50 minutes.  Thank you for this consult.

 



DD:  06/01/2018 11:24

DT:  06/01/2018 14:49

Job#:  [id number]

## 2018-06-01 NOTE — DIAGNOSTIC IMAGING REPORT
PROCEDURE:X-RAY ABDOMEN - KUB

 

COMPARISON:None.

 

INDICATIONS:HYPERKALEMIA

 

FINDINGS:

Bowel gas pattern shows no dilated, air-filled loops of bowel. No mass 

effect or organomegaly. Motion artifact limits evaluation for 

pneumoperitoneum.

 

Scoliotic curvature with associated degenerative disc changes of the 

lumbar spine. Atherosclerotic vascular calcifications. Degenerative 

joint disease of the hips right greater than left.

 

 

CONCLUSION:

Nonobstructive bowel gas pattern. 

 

Dictated by:  Josh Spear M.D. on 6/01/2018 at 7:56     

Electronically approved by:  Josh Spear M.D. on 6/01/2018 at 7:56

## 2018-06-02 VITALS — DIASTOLIC BLOOD PRESSURE: 69 MMHG | SYSTOLIC BLOOD PRESSURE: 158 MMHG

## 2018-06-02 VITALS — DIASTOLIC BLOOD PRESSURE: 65 MMHG | SYSTOLIC BLOOD PRESSURE: 146 MMHG

## 2018-06-02 VITALS — DIASTOLIC BLOOD PRESSURE: 51 MMHG | SYSTOLIC BLOOD PRESSURE: 146 MMHG

## 2018-06-02 VITALS — DIASTOLIC BLOOD PRESSURE: 53 MMHG | SYSTOLIC BLOOD PRESSURE: 138 MMHG

## 2018-06-02 VITALS — DIASTOLIC BLOOD PRESSURE: 56 MMHG | SYSTOLIC BLOOD PRESSURE: 140 MMHG

## 2018-06-02 VITALS — DIASTOLIC BLOOD PRESSURE: 62 MMHG | SYSTOLIC BLOOD PRESSURE: 156 MMHG

## 2018-06-02 VITALS — SYSTOLIC BLOOD PRESSURE: 146 MMHG | DIASTOLIC BLOOD PRESSURE: 63 MMHG

## 2018-06-02 VITALS — DIASTOLIC BLOOD PRESSURE: 55 MMHG | SYSTOLIC BLOOD PRESSURE: 147 MMHG

## 2018-06-02 VITALS — DIASTOLIC BLOOD PRESSURE: 70 MMHG | SYSTOLIC BLOOD PRESSURE: 157 MMHG

## 2018-06-02 VITALS — DIASTOLIC BLOOD PRESSURE: 73 MMHG | SYSTOLIC BLOOD PRESSURE: 150 MMHG

## 2018-06-02 VITALS — SYSTOLIC BLOOD PRESSURE: 147 MMHG | DIASTOLIC BLOOD PRESSURE: 68 MMHG

## 2018-06-02 VITALS — SYSTOLIC BLOOD PRESSURE: 141 MMHG | DIASTOLIC BLOOD PRESSURE: 62 MMHG

## 2018-06-02 VITALS — SYSTOLIC BLOOD PRESSURE: 140 MMHG | DIASTOLIC BLOOD PRESSURE: 52 MMHG

## 2018-06-02 VITALS — SYSTOLIC BLOOD PRESSURE: 118 MMHG | DIASTOLIC BLOOD PRESSURE: 56 MMHG

## 2018-06-02 VITALS — SYSTOLIC BLOOD PRESSURE: 137 MMHG | DIASTOLIC BLOOD PRESSURE: 86 MMHG

## 2018-06-02 VITALS — SYSTOLIC BLOOD PRESSURE: 133 MMHG | DIASTOLIC BLOOD PRESSURE: 51 MMHG

## 2018-06-02 VITALS — SYSTOLIC BLOOD PRESSURE: 139 MMHG | DIASTOLIC BLOOD PRESSURE: 49 MMHG

## 2018-06-02 VITALS — SYSTOLIC BLOOD PRESSURE: 122 MMHG | DIASTOLIC BLOOD PRESSURE: 56 MMHG

## 2018-06-02 VITALS — SYSTOLIC BLOOD PRESSURE: 147 MMHG | DIASTOLIC BLOOD PRESSURE: 57 MMHG

## 2018-06-02 VITALS — DIASTOLIC BLOOD PRESSURE: 52 MMHG | SYSTOLIC BLOOD PRESSURE: 125 MMHG

## 2018-06-02 VITALS — SYSTOLIC BLOOD PRESSURE: 139 MMHG | DIASTOLIC BLOOD PRESSURE: 48 MMHG

## 2018-06-02 VITALS — SYSTOLIC BLOOD PRESSURE: 142 MMHG | DIASTOLIC BLOOD PRESSURE: 103 MMHG

## 2018-06-02 VITALS — SYSTOLIC BLOOD PRESSURE: 149 MMHG | DIASTOLIC BLOOD PRESSURE: 100 MMHG

## 2018-06-02 LAB
ALBUMIN SERPL-MCNC: 1.9 G/DL (ref 3.5–5)
ALBUMIN/GLOB SERPL: 0.7 {RATIO} (ref 0.8–2)
ALP SERPL-CCNC: 95 IU/L (ref 40–150)
ALT SERPL-CCNC: 11 IU/L (ref 0–55)
ANION GAP SERPL CALC-SCNC: 12.4 MMOL/L (ref 8–16)
BASOPHILS # BLD AUTO: 0.1 10*3/UL (ref 0–0.1)
BASOPHILS NFR BLD AUTO: 0.3 % (ref 0–1)
BUN SERPL-MCNC: 41 MG/DL (ref 7–26)
BUN/CREAT SERPL: 16 (ref 6–25)
CALCIUM SERPL-MCNC: 8.7 MG/DL (ref 8.4–10.2)
CHLORIDE SERPL-SCNC: 95 MMOL/L (ref 98–107)
CO2 SERPL-SCNC: 26 MMOL/L (ref 22–29)
DEPRECATED NEUTROPHILS # BLD AUTO: 17.4 10*3/UL (ref 2.1–6.9)
EGFRCR SERPLBLD CKD-EPI 2021: 18 ML/MIN (ref 60–?)
EOSINOPHIL # BLD AUTO: 0 10*3/UL (ref 0–0.4)
EOSINOPHIL NFR BLD AUTO: 0.1 % (ref 0–6)
ERYTHROCYTE [DISTWIDTH] IN CORD BLOOD: 19.9 % (ref 11.7–14.4)
GLOBULIN PLAS-MCNC: 2.7 G/DL (ref 2.3–3.5)
GLUCOSE SERPLBLD-MCNC: 100 MG/DL (ref 74–118)
HCT VFR BLD AUTO: 28.5 % (ref 34.2–44.1)
HGB BLD-MCNC: 9.3 G/DL (ref 12–16)
LYMPHOCYTES # BLD: 0.6 10*3/UL (ref 1–3.2)
LYMPHOCYTES NFR BLD AUTO: 3.3 % (ref 18–39.1)
MAGNESIUM SERPL-MCNC: 1.2 MG/DL (ref 1.3–2.1)
MCH RBC QN AUTO: 28.3 PG (ref 28–32)
MCHC RBC AUTO-ENTMCNC: 32.6 G/DL (ref 31–35)
MCV RBC AUTO: 86.6 FL (ref 81–99)
MONOCYTES # BLD AUTO: 1.1 10*3/UL (ref 0.2–0.8)
MONOCYTES NFR BLD AUTO: 5.7 % (ref 4.4–11.3)
NEUTS SEG NFR BLD AUTO: 88.7 % (ref 38.7–80)
PLATELET # BLD AUTO: 106 X10E3/UL (ref 140–360)
POTASSIUM SERPL-SCNC: 4.4 MMOL/L (ref 3.5–5.1)
RBC # BLD AUTO: 3.29 X10E6/UL (ref 3.6–5.1)
SODIUM SERPL-SCNC: 129 MMOL/L (ref 136–145)

## 2018-06-02 RX ADMIN — PRAMIPEXOLE DIHYDROCHLORIDE SCH MG: 0.25 TABLET ORAL at 20:51

## 2018-06-02 RX ADMIN — VANCOMYCIN HYDROCHLORIDE SCH MG: 250 CAPSULE ORAL at 14:21

## 2018-06-02 RX ADMIN — HYDROCODONE BITARTRATE AND ACETAMINOPHEN PRN EA: 5; 325 TABLET ORAL at 19:52

## 2018-06-02 RX ADMIN — METRONIDAZOLE SCH MLS/HR: 500 INJECTION, SOLUTION INTRAVENOUS at 20:51

## 2018-06-02 RX ADMIN — LEVOTHYROXINE SODIUM SCH MCG: 50 TABLET ORAL at 06:22

## 2018-06-02 RX ADMIN — HYDROCODONE BITARTRATE AND ACETAMINOPHEN PRN EA: 5; 325 TABLET ORAL at 05:56

## 2018-06-02 RX ADMIN — LIDOCAINE SCH EA: 50 PATCH CUTANEOUS at 09:04

## 2018-06-02 RX ADMIN — METRONIDAZOLE SCH MLS/HR: 500 INJECTION, SOLUTION INTRAVENOUS at 04:18

## 2018-06-02 RX ADMIN — VANCOMYCIN HYDROCHLORIDE SCH MG: 250 CAPSULE ORAL at 20:52

## 2018-06-02 RX ADMIN — HYDROCODONE BITARTRATE AND ACETAMINOPHEN PRN EA: 5; 325 TABLET ORAL at 15:40

## 2018-06-02 RX ADMIN — HYDROCODONE BITARTRATE AND ACETAMINOPHEN PRN EA: 5; 325 TABLET ORAL at 23:38

## 2018-06-02 RX ADMIN — SODIUM CHLORIDE SCH MLS/HR: 9 INJECTION, SOLUTION INTRAVENOUS at 09:30

## 2018-06-02 RX ADMIN — HYDROCODONE BITARTRATE AND ACETAMINOPHEN PRN EA: 5; 325 TABLET ORAL at 01:38

## 2018-06-02 RX ADMIN — MEROPENEM SCH MG: 500 INJECTION INTRAVENOUS at 09:05

## 2018-06-02 RX ADMIN — CASTOR OIL AND BALSAM, PERU SCH GM: 788; 87 OINTMENT TOPICAL at 09:00

## 2018-06-02 RX ADMIN — HYDROCODONE BITARTRATE AND ACETAMINOPHEN PRN EA: 5; 325 TABLET ORAL at 10:04

## 2018-06-02 RX ADMIN — CASTOR OIL AND BALSAM, PERU SCH GM: 788; 87 OINTMENT TOPICAL at 15:00

## 2018-06-02 RX ADMIN — CASTOR OIL AND BALSAM, PERU SCH GM: 788; 87 OINTMENT TOPICAL at 20:51

## 2018-06-02 RX ADMIN — SODIUM CHLORIDE SCH MLS/HR: 9 INJECTION, SOLUTION INTRAVENOUS at 19:49

## 2018-06-02 RX ADMIN — VANCOMYCIN HYDROCHLORIDE SCH MG: 250 CAPSULE ORAL at 06:22

## 2018-06-02 RX ADMIN — METRONIDAZOLE SCH MLS/HR: 500 INJECTION, SOLUTION INTRAVENOUS at 12:00

## 2018-06-02 NOTE — PROGRESS NOTE
DATE:  June 02, 2018 



CARDIOLOGY PROGRESS NOTE



SUBJECTIVE:  Shortness of breath improving.  Telemetry with sinus rhythm.



OBJECTIVE  

VITAL SIGNS:  Temperature 96.6, heart rate 84, respiratory rate 16, blood 

pressure 147/57, O2 sat 100% on 2 L per minute nasal cannula. 

GENERAL:  No acute distress. 

CHEST:  Decreased breath sounds bilaterally. 

CARDIOVASCULAR:  Regular rate and rhythm.  Normal S1 and S2.  No S3, no S4. 



ABDOMEN:  Soft. 

EXTREMITIES:  Trace edema. 



CARDIOVASCULAR MEDICATIONS:  Reviewed, including bumetanide.  Patient 

receiving antibiotics.



STUDIES:  White blood cells trending down at 19.5, hemoglobin 9.3, 

platelets 106.  Sodium 129, potassium 4.4, chloride 95, bicarbonate 26, BUN 

41, creatinine 2.6, glucose 100, calcium 8.7, magnesium 1.2.  AST 19, ALT 

11, alk phos 95, total protein 4.6, albumin 1.9.  Blood cultures are 

growing gram-negative rods.  Urine culture growing E. coli.



ASSESSMENT

1. Right upper lobe pneumonia.

2. Bacteremia with gram-negative rods. 

3. Thromboembolic disorder. 

4. Acute-on-chronic diastolic heart failure with most recent 

echocardiogram revealing left ventricular ejection fraction of 60% to 

65%.

5. Anemia secondary to renal disease.

6. Acute kidney injury.

7. Clostridium difficile. 

8. Sepsis. 

9. Respiratory failure.  



RECOMMENDATIONS:  Continue to hold beta blocker for now given recent 

hypotension and worsening sepsis.  Continue antibiotics as per primary 

service.  Antibiotic coverage deferred to ID and primary service.  

Improving from a volume standpoint.  Nephrology managing diuretics.  Will 

continue to monitor.  Overall guarded prognosis.









DD:  06/02/2018 12:24

DT:  06/02/2018 13:36

Job#:  [id number]

## 2018-06-02 NOTE — DIAGNOSTIC IMAGING REPORT
Examination: Single AP view of the chest.



COMPARISON: Single chest 6/1/2018



INDICATION: Shortness of breath

    

IMPRESSION:

 

1.  Lines and Tubes: Supporting right subclavian line is unchanged.

2.  Lungs are well-inflated. No interval change in bilateral moderate pleural

effusions (left greater than right), likely associated atelectasis and

bilateral interstitial opacities extending from the adair, consistent with

interstitial pulmonary edema.

3.  Cardiac silhouette is unremarkable.  Central pulmonary venous congestion.

4.  No acute bony abnormalities.



Signed by: Dr. Emerson Davis M.D. on 6/2/2018 1:29 PM

## 2018-06-03 VITALS — SYSTOLIC BLOOD PRESSURE: 137 MMHG | DIASTOLIC BLOOD PRESSURE: 62 MMHG

## 2018-06-03 VITALS — SYSTOLIC BLOOD PRESSURE: 134 MMHG | DIASTOLIC BLOOD PRESSURE: 76 MMHG

## 2018-06-03 VITALS — DIASTOLIC BLOOD PRESSURE: 67 MMHG | SYSTOLIC BLOOD PRESSURE: 145 MMHG

## 2018-06-03 VITALS — SYSTOLIC BLOOD PRESSURE: 156 MMHG | DIASTOLIC BLOOD PRESSURE: 68 MMHG

## 2018-06-03 VITALS — DIASTOLIC BLOOD PRESSURE: 70 MMHG | SYSTOLIC BLOOD PRESSURE: 153 MMHG

## 2018-06-03 VITALS — DIASTOLIC BLOOD PRESSURE: 52 MMHG | SYSTOLIC BLOOD PRESSURE: 130 MMHG

## 2018-06-03 VITALS — DIASTOLIC BLOOD PRESSURE: 86 MMHG | SYSTOLIC BLOOD PRESSURE: 143 MMHG

## 2018-06-03 VITALS — DIASTOLIC BLOOD PRESSURE: 52 MMHG | SYSTOLIC BLOOD PRESSURE: 117 MMHG

## 2018-06-03 VITALS — SYSTOLIC BLOOD PRESSURE: 153 MMHG | DIASTOLIC BLOOD PRESSURE: 57 MMHG

## 2018-06-03 VITALS — DIASTOLIC BLOOD PRESSURE: 77 MMHG | SYSTOLIC BLOOD PRESSURE: 152 MMHG

## 2018-06-03 VITALS — SYSTOLIC BLOOD PRESSURE: 172 MMHG | DIASTOLIC BLOOD PRESSURE: 70 MMHG

## 2018-06-03 VITALS — DIASTOLIC BLOOD PRESSURE: 49 MMHG | SYSTOLIC BLOOD PRESSURE: 136 MMHG

## 2018-06-03 VITALS — DIASTOLIC BLOOD PRESSURE: 69 MMHG | SYSTOLIC BLOOD PRESSURE: 165 MMHG

## 2018-06-03 VITALS — DIASTOLIC BLOOD PRESSURE: 65 MMHG | SYSTOLIC BLOOD PRESSURE: 148 MMHG

## 2018-06-03 LAB
ALBUMIN SERPL-MCNC: 1.9 G/DL (ref 3.5–5)
ALBUMIN/GLOB SERPL: 0.7 {RATIO} (ref 0.8–2)
ALP SERPL-CCNC: 124 IU/L (ref 40–150)
ALT SERPL-CCNC: 11 IU/L (ref 0–55)
ANION GAP SERPL CALC-SCNC: 14.1 MMOL/L (ref 8–16)
BASOPHILS # BLD AUTO: 0.1 10*3/UL (ref 0–0.1)
BASOPHILS NFR BLD AUTO: 0.4 % (ref 0–1)
BUN SERPL-MCNC: 42 MG/DL (ref 7–26)
BUN/CREAT SERPL: 16 (ref 6–25)
CALCIUM SERPL-MCNC: 8.7 MG/DL (ref 8.4–10.2)
CHLORIDE SERPL-SCNC: 94 MMOL/L (ref 98–107)
CO2 SERPL-SCNC: 27 MMOL/L (ref 22–29)
DEPRECATED NEUTROPHILS # BLD AUTO: 14 10*3/UL (ref 2.1–6.9)
EGFRCR SERPLBLD CKD-EPI 2021: 18 ML/MIN (ref 60–?)
EOSINOPHIL # BLD AUTO: 0.1 10*3/UL (ref 0–0.4)
EOSINOPHIL NFR BLD AUTO: 0.3 % (ref 0–6)
ERYTHROCYTE [DISTWIDTH] IN CORD BLOOD: 19.4 % (ref 11.7–14.4)
GLOBULIN PLAS-MCNC: 2.7 G/DL (ref 2.3–3.5)
GLUCOSE SERPLBLD-MCNC: 84 MG/DL (ref 74–118)
HCT VFR BLD AUTO: 30.5 % (ref 34.2–44.1)
HGB BLD-MCNC: 10.3 G/DL (ref 12–16)
LYMPHOCYTES # BLD: 0.7 10*3/UL (ref 1–3.2)
LYMPHOCYTES NFR BLD AUTO: 4.1 % (ref 18–39.1)
LYMPHOCYTES NFR BLD MANUAL: 6 % (ref 19–48)
MCH RBC QN AUTO: 27.9 PG (ref 28–32)
MCHC RBC AUTO-ENTMCNC: 33.8 G/DL (ref 31–35)
MCV RBC AUTO: 82.7 FL (ref 81–99)
MONOCYTES # BLD AUTO: 1.2 10*3/UL (ref 0.2–0.8)
MONOCYTES NFR BLD AUTO: 7.4 % (ref 4.4–11.3)
MONOCYTES NFR BLD MANUAL: 6 % (ref 3.4–9)
NEUTS BAND NFR BLD MANUAL: 3 %
NEUTS SEG NFR BLD AUTO: 86.8 % (ref 38.7–80)
NEUTS SEG NFR BLD MANUAL: 85 % (ref 40–74)
PLAT MORPH BLD: NORMAL
PLATELET # BLD AUTO: 102 X10E3/UL (ref 140–360)
PLATELET # BLD EST: (no result) 10*3/UL
POTASSIUM SERPL-SCNC: 4.1 MMOL/L (ref 3.5–5.1)
RBC # BLD AUTO: 3.69 X10E6/UL (ref 3.6–5.1)
RBC MORPH BLD: NORMAL
SODIUM SERPL-SCNC: 131 MMOL/L (ref 136–145)

## 2018-06-03 RX ADMIN — LIDOCAINE SCH EA: 50 PATCH CUTANEOUS at 10:24

## 2018-06-03 RX ADMIN — METRONIDAZOLE SCH MLS/HR: 500 INJECTION, SOLUTION INTRAVENOUS at 04:24

## 2018-06-03 RX ADMIN — METRONIDAZOLE SCH MLS/HR: 500 INJECTION, SOLUTION INTRAVENOUS at 11:27

## 2018-06-03 RX ADMIN — HYDROCODONE BITARTRATE AND ACETAMINOPHEN PRN EA: 5; 325 TABLET ORAL at 23:30

## 2018-06-03 RX ADMIN — LEVOTHYROXINE SODIUM SCH MCG: 50 TABLET ORAL at 06:46

## 2018-06-03 RX ADMIN — SODIUM CHLORIDE SCH MLS/HR: 9 INJECTION, SOLUTION INTRAVENOUS at 16:49

## 2018-06-03 RX ADMIN — PRAMIPEXOLE DIHYDROCHLORIDE SCH MG: 0.25 TABLET ORAL at 20:13

## 2018-06-03 RX ADMIN — SODIUM CHLORIDE SCH MLS/HR: 9 INJECTION, SOLUTION INTRAVENOUS at 06:46

## 2018-06-03 RX ADMIN — METOPROLOL TARTRATE SCH MG: 25 TABLET, FILM COATED ORAL at 20:12

## 2018-06-03 RX ADMIN — HYDROCODONE BITARTRATE AND ACETAMINOPHEN PRN EA: 5; 325 TABLET ORAL at 04:25

## 2018-06-03 RX ADMIN — VANCOMYCIN HYDROCHLORIDE SCH MG: 250 CAPSULE ORAL at 06:46

## 2018-06-03 RX ADMIN — VANCOMYCIN HYDROCHLORIDE SCH MG: 250 CAPSULE ORAL at 13:43

## 2018-06-03 RX ADMIN — VANCOMYCIN HYDROCHLORIDE SCH MG: 250 CAPSULE ORAL at 22:00

## 2018-06-03 RX ADMIN — CASTOR OIL AND BALSAM, PERU SCH GM: 788; 87 OINTMENT TOPICAL at 20:13

## 2018-06-03 RX ADMIN — CASTOR OIL AND BALSAM, PERU SCH GM: 788; 87 OINTMENT TOPICAL at 14:02

## 2018-06-03 RX ADMIN — CASTOR OIL AND BALSAM, PERU SCH GM: 788; 87 OINTMENT TOPICAL at 10:24

## 2018-06-03 RX ADMIN — METRONIDAZOLE SCH MLS/HR: 500 INJECTION, SOLUTION INTRAVENOUS at 20:13

## 2018-06-03 RX ADMIN — HYDROCODONE BITARTRATE AND ACETAMINOPHEN PRN EA: 5; 325 TABLET ORAL at 20:00

## 2018-06-03 RX ADMIN — HYDROCODONE BITARTRATE AND ACETAMINOPHEN PRN EA: 5; 325 TABLET ORAL at 11:33

## 2018-06-03 RX ADMIN — PANTOPRAZOLE SODIUM SCH MG: 40 TABLET, DELAYED RELEASE ORAL at 07:39

## 2018-06-03 RX ADMIN — METOPROLOL TARTRATE SCH MG: 25 TABLET, FILM COATED ORAL at 12:42

## 2018-06-03 NOTE — PROGRESS NOTE
DATE:  June 03, 2018 



CARDIOLOGY PROGRESS NOTE



SUBJECTIVE:  Improving shortness of breath.



OBJECTIVE   

VITAL SIGNS:  Temperature 96.6, heart rate 103, respiratory rate 20, blood 

pressure 165/69, O2 sat 99% on nasal cannula at 4 liters per minute. 

GENERAL:  No acute distress. 

CHEST:  Decreased breath sounds in bilateral bases.

CARDIOVASCULAR:  Regular rate and rhythm.  Normal S1 and S2.  No S3.  No 

S4.  No murmurs. 

ABDOMEN:  Soft. 

EXTREMITIES:  Trace edema. 



CARDIOVASCULAR MEDICATIONS:  Reviewed.  On Bumex drip at 10 per hour.



STUDIES:  White blood cells 16.1, hemoglobin 10.3, platelets 102.  Sodium 

131, potassium 4.1, chloride 94, bicarbonate 27, BUN 42, creatinine 2.5, 

glucose 84, calcium 8.7, total bilirubin 0.3, AST 15, ALT 11, alk phos 124, 

total protein 4.6, albumin 1.9.



ASSESSMENT

1. Right upper lobe pneumonia.

2. Bacteremia with gram-negative rods. 

3. Thromboembolic disorder.  

4. Acute-on-chronic diastolic heart failure with most recent 

echocardiogram left ventricular ejection fraction 60% to 65%.

5. Anemia secondary to renal disease.

6. Acute kidney injury.

7. Clostridium difficile.

8. Sepsis.

9. Respiratory failure.



RECOMMENDATIONS:  Given labile blood pressure and recent sepsis, beta 

blockers are on hold.  Blood pressure seems to be improving.  Can 

rechallenge today.  Continue diuretics.  As for now, however, approaching 

euvolemia.  Can consider transitioning to IV drip starting tomorrow 

depending on how she continues to do.  Her creatinine is 2.5, down from 2.6 

yesterday.  The peak creatinine was 7.9 on 5/24/2018.  Continue antibiotics 

per primary service and ID.







DD:  06/03/2018 12:25

DT:  06/03/2018 15:22

Job#:  [id number]

## 2018-06-04 VITALS — DIASTOLIC BLOOD PRESSURE: 56 MMHG | SYSTOLIC BLOOD PRESSURE: 137 MMHG

## 2018-06-04 VITALS — DIASTOLIC BLOOD PRESSURE: 62 MMHG | SYSTOLIC BLOOD PRESSURE: 135 MMHG

## 2018-06-04 VITALS — SYSTOLIC BLOOD PRESSURE: 151 MMHG | DIASTOLIC BLOOD PRESSURE: 65 MMHG

## 2018-06-04 VITALS — DIASTOLIC BLOOD PRESSURE: 67 MMHG | SYSTOLIC BLOOD PRESSURE: 140 MMHG

## 2018-06-04 VITALS — SYSTOLIC BLOOD PRESSURE: 147 MMHG | DIASTOLIC BLOOD PRESSURE: 65 MMHG

## 2018-06-04 VITALS — DIASTOLIC BLOOD PRESSURE: 65 MMHG | SYSTOLIC BLOOD PRESSURE: 151 MMHG

## 2018-06-04 VITALS — SYSTOLIC BLOOD PRESSURE: 149 MMHG | DIASTOLIC BLOOD PRESSURE: 69 MMHG

## 2018-06-04 LAB
ALBUMIN SERPL-MCNC: 1.9 G/DL (ref 3.5–5)
ALBUMIN/GLOB SERPL: 0.8 {RATIO} (ref 0.8–2)
ALP SERPL-CCNC: 166 IU/L (ref 40–150)
ALT SERPL-CCNC: 12 IU/L (ref 0–55)
ANION GAP SERPL CALC-SCNC: 12.9 MMOL/L (ref 8–16)
BASOPHILS # BLD AUTO: 0 10*3/UL (ref 0–0.1)
BASOPHILS NFR BLD AUTO: 0.4 % (ref 0–1)
BUN SERPL-MCNC: 45 MG/DL (ref 7–26)
BUN/CREAT SERPL: 19 (ref 6–25)
CALCIUM SERPL-MCNC: 8.3 MG/DL (ref 8.4–10.2)
CHLORIDE SERPL-SCNC: 95 MMOL/L (ref 98–107)
CO2 SERPL-SCNC: 28 MMOL/L (ref 22–29)
DEPRECATED NEUTROPHILS # BLD AUTO: 7.5 10*3/UL (ref 2.1–6.9)
EGFRCR SERPLBLD CKD-EPI 2021: 20 ML/MIN (ref 60–?)
EOSINOPHIL # BLD AUTO: 0.1 10*3/UL (ref 0–0.4)
EOSINOPHIL NFR BLD AUTO: 0.8 % (ref 0–6)
ERYTHROCYTE [DISTWIDTH] IN CORD BLOOD: 19.9 % (ref 11.7–14.4)
GLOBULIN PLAS-MCNC: 2.4 G/DL (ref 2.3–3.5)
GLUCOSE SERPLBLD-MCNC: 79 MG/DL (ref 74–118)
HCT VFR BLD AUTO: 31.8 % (ref 34.2–44.1)
HGB BLD-MCNC: 10.6 G/DL (ref 12–16)
LYMPHOCYTES # BLD: 0.8 10*3/UL (ref 1–3.2)
LYMPHOCYTES NFR BLD AUTO: 8.3 % (ref 18–39.1)
MCH RBC QN AUTO: 27.9 PG (ref 28–32)
MCHC RBC AUTO-ENTMCNC: 33.3 G/DL (ref 31–35)
MCV RBC AUTO: 83.7 FL (ref 81–99)
MONOCYTES # BLD AUTO: 0.9 10*3/UL (ref 0.2–0.8)
MONOCYTES NFR BLD AUTO: 9.7 % (ref 4.4–11.3)
NEUTS SEG NFR BLD AUTO: 80.3 % (ref 38.7–80)
PLATELET # BLD AUTO: 104 X10E3/UL (ref 140–360)
POTASSIUM SERPL-SCNC: 3.9 MMOL/L (ref 3.5–5.1)
RBC # BLD AUTO: 3.8 X10E6/UL (ref 3.6–5.1)
SODIUM SERPL-SCNC: 132 MMOL/L (ref 136–145)

## 2018-06-04 PROCEDURE — 02HV33Z INSERTION OF INFUSION DEVICE INTO SUPERIOR VENA CAVA, PERCUTANEOUS APPROACH: ICD-10-PCS

## 2018-06-04 RX ADMIN — VANCOMYCIN HYDROCHLORIDE SCH MG: 250 CAPSULE ORAL at 21:14

## 2018-06-04 RX ADMIN — MEROPENEM SCH MG: 500 INJECTION INTRAVENOUS at 09:00

## 2018-06-04 RX ADMIN — LEVOTHYROXINE SODIUM SCH MCG: 50 TABLET ORAL at 05:06

## 2018-06-04 RX ADMIN — METRONIDAZOLE SCH MLS/HR: 500 INJECTION, SOLUTION INTRAVENOUS at 04:16

## 2018-06-04 RX ADMIN — CASTOR OIL AND BALSAM, PERU SCH GM: 788; 87 OINTMENT TOPICAL at 15:00

## 2018-06-04 RX ADMIN — HYDROCODONE BITARTRATE AND ACETAMINOPHEN PRN EA: 5; 325 TABLET ORAL at 14:30

## 2018-06-04 RX ADMIN — CASTOR OIL AND BALSAM, PERU SCH GM: 788; 87 OINTMENT TOPICAL at 09:00

## 2018-06-04 RX ADMIN — LIDOCAINE SCH EA: 50 PATCH CUTANEOUS at 09:00

## 2018-06-04 RX ADMIN — VANCOMYCIN HYDROCHLORIDE SCH MG: 250 CAPSULE ORAL at 14:00

## 2018-06-04 RX ADMIN — PANTOPRAZOLE SODIUM SCH MG: 40 TABLET, DELAYED RELEASE ORAL at 07:30

## 2018-06-04 RX ADMIN — VANCOMYCIN HYDROCHLORIDE SCH MG: 250 CAPSULE ORAL at 05:06

## 2018-06-04 RX ADMIN — HYDROCODONE BITARTRATE AND ACETAMINOPHEN PRN EA: 5; 325 TABLET ORAL at 05:07

## 2018-06-04 RX ADMIN — SODIUM CHLORIDE SCH MLS/HR: 9 INJECTION, SOLUTION INTRAVENOUS at 02:22

## 2018-06-04 RX ADMIN — SODIUM CHLORIDE SCH MLS/HR: 9 INJECTION, SOLUTION INTRAVENOUS at 12:30

## 2018-06-04 RX ADMIN — CASTOR OIL AND BALSAM, PERU SCH GM: 788; 87 OINTMENT TOPICAL at 20:52

## 2018-06-04 RX ADMIN — METRONIDAZOLE SCH MLS/HR: 500 INJECTION, SOLUTION INTRAVENOUS at 20:52

## 2018-06-04 RX ADMIN — METRONIDAZOLE SCH MLS/HR: 500 INJECTION, SOLUTION INTRAVENOUS at 13:35

## 2018-06-04 RX ADMIN — PRAMIPEXOLE DIHYDROCHLORIDE SCH MG: 0.25 TABLET ORAL at 20:52

## 2018-06-04 NOTE — DIAGNOSTIC IMAGING REPORT
Non-tunneled Central Venous Catheter Placement 6/4/2018



Pre-Procedure Diagnosis: Bacteremia

Post-procedure Diagnosis:Bacteremic



: CHARMAINE Lynne

Assistant: None

Sedation: None. 1% lidocaine local anesthesia.



Radiation Dose:0.4 mGy (cumulative air kerma)

Fluoroscopy time:0.1 minutes



Estimate blood loss: None Blood administered: None

Complications: None

Implants/Grafts: 20 cm 7-Greek 3 lumen CVC

Specimen: Right internal jugular central venous catheter tip





Procedure:

Informed consent was obtained and the patient positioned supine in the

fluoroscopy suite. A timeout was performed, followed by preliminary ultrasound

of the left internal jugular vein (see findings below). The left neck was

prepped and draped in standard fashion.



Using real-time ultrasound guidance a 18 gauge vascular needle was used to

access the  left internal jugular vein. An image was stored in the electronic

medical record. A wire was advanced while monitoring the patient's cardiac

rhythm and the needle exchanged for a non-tunneled central venous catheter

using Seldinger technique. The catheter was positioned at the low SVC/superior

atrial-caval junction under fluoroscopy.



At the end of the procedure the catheter was flushed, secured to the skin and a

sterile dressing applied. The right internal jugular central venous catheter

was then removed without complication. The patient tolerated the procedure well

and without immediate complication.



Findings:

Patent left internal jugular vein as demonstrated by normal ultrasound

compressibility.



Impression:

1.  Successful placement of a non-tunneled left internal jugular central venous

catheter using ultrasound and fluoroscopic guidance.

2.  Right internal jugular central venous catheter removal. The tip was

submitted for culture.





This report was generated with voice-recognition technology. Errors in

transcription can occur. Please interpret accordingly and contact a radiologist

if there are any questions regarding the report.



Signed by: Dr. Andrea Lynne M.D. on 6/4/2018 1:54 PM

## 2018-06-04 NOTE — DIAGNOSTIC IMAGING REPORT
Non-tunneled Central Venous Catheter Placement 6/4/2018



Pre-Procedure Diagnosis: Bacteremia

Post-procedure Diagnosis:Bacteremic



: CHARMAINE Lynne

Assistant: None

Sedation: None. 1% lidocaine local anesthesia.



Radiation Dose:0.4 mGy (cumulative air kerma)

Fluoroscopy time:0.1 minutes



Estimate blood loss: None Blood administered: None

Complications: None

Implants/Grafts: 20 cm 7-American 3 lumen CVC

Specimen: Right internal jugular central venous catheter tip





Procedure:

Informed consent was obtained and the patient positioned supine in the

fluoroscopy suite. A timeout was performed, followed by preliminary ultrasound

of the left internal jugular vein (see findings below). The left neck was

prepped and draped in standard fashion.



Using real-time ultrasound guidance a 18 gauge vascular needle was used to

access the  left internal jugular vein. An image was stored in the electronic

medical record. A wire was advanced while monitoring the patient's cardiac

rhythm and the needle exchanged for a non-tunneled central venous catheter

using Seldinger technique. The catheter was positioned at the low SVC/superior

atrial-caval junction under fluoroscopy.



At the end of the procedure the catheter was flushed, secured to the skin and a

sterile dressing applied. The right internal jugular central venous catheter

was then removed without complication. The patient tolerated the procedure well

and without immediate complication.



Findings:

Patent left internal jugular vein as demonstrated by normal ultrasound

compressibility.



Impression:

1.  Successful placement of a non-tunneled left internal jugular central venous

catheter using ultrasound and fluoroscopic guidance.

2.  Right internal jugular central venous catheter removal. The tip was

submitted for culture.





This report was generated with voice-recognition technology. Errors in

transcription can occur. Please interpret accordingly and contact a radiologist

if there are any questions regarding the report.



Signed by: Dr. Andrea Lynne M.D. on 6/4/2018 1:54 PM

## 2018-06-04 NOTE — PROGRESS NOTE
DATE:  June 04, 2018 



CARDIOLOGY PROGRESS NOTE



SUBJECTIVE:  The patient denies chest pain or shortness of breath.  She 

reports she is feeling improved.



OBJECTIVE   

VITAL SIGNS:  Temperature 97.2 degrees, pulse 90, respiratory rate 20, 

blood pressure 151/65, oxygen saturation 98% on 2 L nasal cannula. 

GENERAL:  Elderly woman, frail, chronically ill-appearing, no acute 

distress. 

LUNGS:  Decreased breath sounds in bilateral bases.  No wheezes or 

crackles.

CARDIOVASCULAR:  Normal rate, regular rhythm.  Normal S1 and S2.  No 

murmur.

ABDOMEN:  Soft. 

EXTREMITIES:  No edema. 



CARDIAC MEDICATIONS

1. Levothyroxine 50 mcg p.o. daily.

2. Metoprolol tartrate 12.5 mg p.o. q.12 h. 

3. Bumex 1 mg an hour.



LABS:  WBC 9.31, hemoglobin 10.6, hematocrit 31.8, platelets 104.  Sodium 

132, potassium 3.9, chloride 95, CO2 28, BUN 45, creatinine 2.31.  



TELEMETRY:  Normal sinus rhythm. 



IMPRESSION 

1. Right upper lobe pneumonia.

2. Serratia marcescens bacteremia. 

3. Escherichia coli urinary tract infection. 

4. Clostridium difficile diarrhea. 

5. Acute-on-chronic diastolic heart failure with most recent ejection 

fraction on echocardiogram of 60% to 65%.

6. Acute kidney injury, improving. 

7. Thromboembolic disease. 

8. Anemia.  

9. Hypertension.



RECOMMENDATIONS

1. Antibiotics per infectious disease. 

2. Continue IV diuretics.  The patient's creatinine is improving with 

diuretics.  We will continue to monitor creatinine and replete 

electrolytes.  

3. The patient is now hypertensive and tolerating low-dose beta blocker, 

continue for now.  Further titration in the next day if blood pressure 

remains elevated. 

4. Physical therapy as tolerated. 



Thank you for this consult.  We will continue to follow. 







DD:  06/04/2018 10:53

DT:  06/04/2018 11:00

Job#:  [id number]

## 2018-06-04 NOTE — DIAGNOSTIC IMAGING REPORT
Non-tunneled Central Venous Catheter Placement 6/4/2018



Pre-Procedure Diagnosis: Bacteremia

Post-procedure Diagnosis:Bacteremic



: CHARMAINE Lynne

Assistant: None

Sedation: None. 1% lidocaine local anesthesia.



Radiation Dose:0.4 mGy (cumulative air kerma)

Fluoroscopy time:0.1 minutes



Estimate blood loss: None Blood administered: None

Complications: None

Implants/Grafts: 20 cm 7-Micronesian 3 lumen CVC

Specimen: Right internal jugular central venous catheter tip





Procedure:

Informed consent was obtained and the patient positioned supine in the

fluoroscopy suite. A timeout was performed, followed by preliminary ultrasound

of the left internal jugular vein (see findings below). The left neck was

prepped and draped in standard fashion.



Using real-time ultrasound guidance a 18 gauge vascular needle was used to

access the  left internal jugular vein. An image was stored in the electronic

medical record. A wire was advanced while monitoring the patient's cardiac

rhythm and the needle exchanged for a non-tunneled central venous catheter

using Seldinger technique. The catheter was positioned at the low SVC/superior

atrial-caval junction under fluoroscopy.



At the end of the procedure the catheter was flushed, secured to the skin and a

sterile dressing applied. The right internal jugular central venous catheter

was then removed without complication. The patient tolerated the procedure well

and without immediate complication.



Findings:

Patent left internal jugular vein as demonstrated by normal ultrasound

compressibility.



Impression:

1.  Successful placement of a non-tunneled left internal jugular central venous

catheter using ultrasound and fluoroscopic guidance.

2.  Right internal jugular central venous catheter removal. The tip was

submitted for culture.





This report was generated with voice-recognition technology. Errors in

transcription can occur. Please interpret accordingly and contact a radiologist

if there are any questions regarding the report.



Signed by: Dr. Andrea Lynne M.D. on 6/4/2018 1:54 PM

## 2018-06-04 NOTE — DIAGNOSTIC IMAGING REPORT
Non-tunneled Central Venous Catheter Placement 6/4/2018



Pre-Procedure Diagnosis: Bacteremia

Post-procedure Diagnosis:Bacteremic



: CHARMAINE Lynne

Assistant: None

Sedation: None. 1% lidocaine local anesthesia.



Radiation Dose:0.4 mGy (cumulative air kerma)

Fluoroscopy time:0.1 minutes



Estimate blood loss: None Blood administered: None

Complications: None

Implants/Grafts: 20 cm 7-Libyan 3 lumen CVC

Specimen: Right internal jugular central venous catheter tip





Procedure:

Informed consent was obtained and the patient positioned supine in the

fluoroscopy suite. A timeout was performed, followed by preliminary ultrasound

of the left internal jugular vein (see findings below). The left neck was

prepped and draped in standard fashion.



Using real-time ultrasound guidance a 18 gauge vascular needle was used to

access the  left internal jugular vein. An image was stored in the electronic

medical record. A wire was advanced while monitoring the patient's cardiac

rhythm and the needle exchanged for a non-tunneled central venous catheter

using Seldinger technique. The catheter was positioned at the low SVC/superior

atrial-caval junction under fluoroscopy.



At the end of the procedure the catheter was flushed, secured to the skin and a

sterile dressing applied. The right internal jugular central venous catheter

was then removed without complication. The patient tolerated the procedure well

and without immediate complication.



Findings:

Patent left internal jugular vein as demonstrated by normal ultrasound

compressibility.



Impression:

1.  Successful placement of a non-tunneled left internal jugular central venous

catheter using ultrasound and fluoroscopic guidance.

2.  Right internal jugular central venous catheter removal. The tip was

submitted for culture.





This report was generated with voice-recognition technology. Errors in

transcription can occur. Please interpret accordingly and contact a radiologist

if there are any questions regarding the report.



Signed by: Dr. Andrea Lynne M.D. on 6/4/2018 1:54 PM

## 2018-06-05 VITALS — SYSTOLIC BLOOD PRESSURE: 156 MMHG | DIASTOLIC BLOOD PRESSURE: 71 MMHG

## 2018-06-05 VITALS — SYSTOLIC BLOOD PRESSURE: 155 MMHG | DIASTOLIC BLOOD PRESSURE: 69 MMHG

## 2018-06-05 VITALS — DIASTOLIC BLOOD PRESSURE: 65 MMHG | SYSTOLIC BLOOD PRESSURE: 138 MMHG

## 2018-06-05 VITALS — SYSTOLIC BLOOD PRESSURE: 111 MMHG | DIASTOLIC BLOOD PRESSURE: 55 MMHG

## 2018-06-05 LAB
ALBUMIN SERPL-MCNC: 2 G/DL (ref 3.5–5)
ALBUMIN/GLOB SERPL: 0.9 {RATIO} (ref 0.8–2)
ALP SERPL-CCNC: 196 IU/L (ref 40–150)
ALT SERPL-CCNC: 16 IU/L (ref 0–55)
ANION GAP SERPL CALC-SCNC: 13.6 MMOL/L (ref 8–16)
ANISOCYTOSIS BLD QL SMEAR: SLIGHT
BASOPHILS # BLD AUTO: 0.1 10*3/UL (ref 0–0.1)
BASOPHILS NFR BLD AUTO: 1 % (ref 0–1)
BUN SERPL-MCNC: 46 MG/DL (ref 7–26)
BUN/CREAT SERPL: 21 (ref 6–25)
CALCIUM SERPL-MCNC: 8.1 MG/DL (ref 8.4–10.2)
CHLORIDE SERPL-SCNC: 96 MMOL/L (ref 98–107)
CO2 SERPL-SCNC: 26 MMOL/L (ref 22–29)
DEPRECATED NEUTROPHILS # BLD AUTO: 5.1 10*3/UL (ref 2.1–6.9)
EGFRCR SERPLBLD CKD-EPI 2021: 21 ML/MIN (ref 60–?)
EOSINOPHIL # BLD AUTO: 0 10*3/UL (ref 0–0.4)
EOSINOPHIL NFR BLD AUTO: 0.6 % (ref 0–6)
EOSINOPHIL NFR BLD MANUAL: 1 % (ref 0–7)
ERYTHROCYTE [DISTWIDTH] IN CORD BLOOD: 20.2 % (ref 11.7–14.4)
GLOBULIN PLAS-MCNC: 2.3 G/DL (ref 2.3–3.5)
GLUCOSE SERPLBLD-MCNC: 71 MG/DL (ref 74–118)
HCT VFR BLD AUTO: 31.6 % (ref 34.2–44.1)
HGB BLD-MCNC: 10.3 G/DL (ref 12–16)
HYPOCHROMIA BLD QL SMEAR: SLIGHT
LYMPHOCYTES # BLD: 1 10*3/UL (ref 1–3.2)
LYMPHOCYTES NFR BLD AUTO: 14 % (ref 18–39.1)
LYMPHOCYTES NFR BLD MANUAL: 18 % (ref 19–48)
MAGNESIUM SERPL-MCNC: 1.5 MG/DL (ref 1.3–2.1)
MCH RBC QN AUTO: 27.9 PG (ref 28–32)
MCHC RBC AUTO-ENTMCNC: 32.6 G/DL (ref 31–35)
MCV RBC AUTO: 85.6 FL (ref 81–99)
METAMYELOCYTES NFR BLD MANUAL: 2 % (ref 0–0)
MONOCYTES # BLD AUTO: 0.9 10*3/UL (ref 0.2–0.8)
MONOCYTES NFR BLD AUTO: 12.7 % (ref 4.4–11.3)
MONOCYTES NFR BLD MANUAL: 4 % (ref 3.4–9)
NEUTS SEG NFR BLD AUTO: 70.9 % (ref 38.7–80)
NEUTS SEG NFR BLD MANUAL: 74 % (ref 40–74)
PLAT MORPH BLD: (no result)
PLATELET # BLD AUTO: 84 X10E3/UL (ref 140–360)
PLATELET # BLD EST: (no result) 10*3/UL
POIKILOCYTOSIS BLD QL SMEAR: SLIGHT
POTASSIUM SERPL-SCNC: 3.6 MMOL/L (ref 3.5–5.1)
RBC # BLD AUTO: 3.69 X10E6/UL (ref 3.6–5.1)
RBC MORPH BLD: (no result)
SODIUM SERPL-SCNC: 132 MMOL/L (ref 136–145)
TARGETS BLD QL SMEAR: (no result)

## 2018-06-05 RX ADMIN — METOPROLOL TARTRATE SCH MG: 25 TABLET, FILM COATED ORAL at 11:45

## 2018-06-05 RX ADMIN — LIDOCAINE SCH EA: 50 PATCH CUTANEOUS at 08:11

## 2018-06-05 RX ADMIN — LEVOTHYROXINE SODIUM SCH MCG: 50 TABLET ORAL at 05:53

## 2018-06-05 RX ADMIN — VANCOMYCIN HYDROCHLORIDE SCH MG: 250 CAPSULE ORAL at 05:53

## 2018-06-05 RX ADMIN — PANTOPRAZOLE SODIUM SCH MG: 40 TABLET, DELAYED RELEASE ORAL at 08:11

## 2018-06-05 RX ADMIN — CASTOR OIL AND BALSAM, PERU SCH GM: 788; 87 OINTMENT TOPICAL at 11:41

## 2018-06-05 RX ADMIN — METRONIDAZOLE SCH MLS/HR: 500 INJECTION, SOLUTION INTRAVENOUS at 11:46

## 2018-06-05 RX ADMIN — CASTOR OIL AND BALSAM, PERU SCH GM: 788; 87 OINTMENT TOPICAL at 17:03

## 2018-06-05 RX ADMIN — MEROPENEM SCH MG: 500 INJECTION INTRAVENOUS at 08:11

## 2018-06-05 RX ADMIN — CASTOR OIL AND BALSAM, PERU SCH GM: 788; 87 OINTMENT TOPICAL at 17:11

## 2018-06-05 RX ADMIN — CASTOR OIL AND BALSAM, PERU SCH GM: 788; 87 OINTMENT TOPICAL at 08:11

## 2018-06-05 RX ADMIN — HYDROCODONE BITARTRATE AND ACETAMINOPHEN PRN EA: 5; 325 TABLET ORAL at 04:08

## 2018-06-05 RX ADMIN — VANCOMYCIN HYDROCHLORIDE SCH MG: 250 CAPSULE ORAL at 17:03

## 2018-06-05 RX ADMIN — METRONIDAZOLE SCH MLS/HR: 500 INJECTION, SOLUTION INTRAVENOUS at 03:16

## 2018-06-05 NOTE — PROGRESS NOTE
DATE:  June 05, 2018 



CARDIOLOGY PROGRESS NOTE



SUBJECTIVE:  The patient denies chest pain or shortness of breath.  Her 

appetite is improved.



OBJECTIVE   

VITAL SIGNS:  Temperature 95.6 degrees, pulse 95, respiratory rate 20, 

blood pressure 111/55, oxygen saturation 100% on 3 L nasal cannula. 

GENERAL:  Elderly woman, chronically ill-appearing, cachectic, no acute 

distress. 

LUNGS:  Decreased breath sounds in bilateral bases.  No wheezes or 

crackles.

CARDIOVASCULAR:  Normal rate, regular rhythm.  Normal S1 and S2.  No 

murmur.

ABDOMEN:  Soft. 

EXTREMITIES:  There is 1+ pitting edema. 



CARDIAC MEDICATIONS

1. Metoprolol tartrate 12.5 mg p.o. q.12 h. 

2. Levothyroxine 50 mcg p.o. daily.



LABS:  WBC 7.16, hemoglobin 10.3, hematocrit 31.6, platelets 84.  Sodium 

132, potassium 3.6, chloride 96, CO2 26, BUN 46, creatinine 2.24.  Catheter 

tip culture growing gram-negative rods.



TELEMETRY:  Normal sinus rhythm. 



IMPRESSION 

1. Serratia marcescens bacteremia. 

2. Escherichia coli urinary tract infection. 

3. Clostridium difficile diarrhea. 

4. Acute-on-chronic diastolic heart failure with most recent ejection 

fraction on echocardiogram of 60% to 65%.

5. Acute kidney injury, improving. 

6. Thromboembolic disease. 

7. Anemia.  

8. Hypertension.

9. Gram-negative daniel catheter tip positive culture.



RECOMMENDATIONS:  Antibiotics per infectious disease. Resume IV diuretics.  

The patient's creatinine has improved with diuresis.  Monitor creatinine 

and replete electrolytes.  The patient's blood pressure has been 

acceptable.  We will make no changes to her antihypertensive therapy at 

this time.  Physical therapy as tolerated. 



Thank you for this consult.  We will continue to follow. 









DD:  06/05/2018 13:17

DT:  06/05/2018 13:39

Job#:  [id number]

## 2018-06-20 NOTE — CONSULTATION
DATE OF CONSULTATION:  May 29, 2018



CONSULTATION TO:  Dr. Cunningham



HISTORY:  Kindra Arthur is an 83-year-old white female who has been 

referred to me for evaluation of iron deficiency anemia and leukemoid 

reaction.  The patient had presented with cough, fever.  Subsequently was 

found to have bronchopneumonia.



SOCIAL HISTORY:  History of smoking in the past.



FAMILY HISTORY:  Noncontributory.



ALLERGIES:  REPORTED LEVAQUIN, STATINS, SULFA.



MEDICATIONS:  At this time consist of:

1. Sodium gluconate.

2. Vancomycin.

3. Heparin.

4. Mannitol.

5. Dextrose.

6. Megace.

7. Metoprolol.

8. Protonix.

9. Synthroid.



REVIEW OF SYSTEMS:

HEENT:  Normal.

CARDIAC:  History of hypertension.

RESPIRATORY:  History of COPD.

GI:  Multiple GI bleeds.

:  Chronic renal failure.

MUSCULOSKELETAL:  Normal.

SKIN AND BREASTS:  Normal.

NEUROENDOCRINE:  History of hypothyroidism.



PHYSICAL EXAMINATION:

GENERAL:  A rather thin-built female, anemic.

HEENT:  No palpable adenopathy.

HEART:  Within normal limits.

LUNGS:  Clear.

ABDOMEN:  Obese.  There is no hepatosplenomegaly.

RECTAL:  Deferred.

CENTRAL NERVOUS SYSTEM:  Essentially normal.



LABS:  Shows a hemoglobin of 9.5, hematocrit of 29.1, white count of 8200, 

platelets 215,000.



IMPRESSION:

1. Iron deficiency anemia, partially treated.

2. Chronic renal failure.

3. Hypoproteinemia.

4. Hypoalbuminemia.

5. Chronic obstructive pulmonary disease.

6. Left pleural effusion.

7. Atelectasis.

8. Bronchopneumonia.



PLAN, COMMENTS, AND SUGGESTIONS:  Continue antibiotics.  Hematologically is 

stable.  Discontinue Megace.



Thank you.

DD:  06/20/2018 09:58 DT:  06/20/2018 10:07

Job#:  [id number]